# Patient Record
Sex: FEMALE | Race: WHITE | NOT HISPANIC OR LATINO | Employment: OTHER | URBAN - METROPOLITAN AREA
[De-identification: names, ages, dates, MRNs, and addresses within clinical notes are randomized per-mention and may not be internally consistent; named-entity substitution may affect disease eponyms.]

---

## 2017-01-09 RX ORDER — METFORMIN HYDROCHLORIDE 1000 MG/1
TABLET ORAL
Qty: 180 TABLET | Refills: 0 | Status: SHIPPED | OUTPATIENT
Start: 2017-01-09 | End: 2017-08-16 | Stop reason: SDUPTHER

## 2017-01-25 RX ORDER — GLYBURIDE 5 MG/1
TABLET ORAL
Qty: 180 TABLET | Refills: 0 | Status: SHIPPED | OUTPATIENT
Start: 2017-01-25 | End: 2017-08-27 | Stop reason: SDUPTHER

## 2017-01-26 RX ORDER — NORTRIPTYLINE HYDROCHLORIDE 25 MG/1
CAPSULE ORAL
Qty: 90 CAPSULE | Refills: 0 | Status: SHIPPED | OUTPATIENT
Start: 2017-01-26 | End: 2017-08-27 | Stop reason: SDUPTHER

## 2017-01-26 RX ORDER — CARVEDILOL 6.25 MG/1
TABLET ORAL
Qty: 180 TABLET | Refills: 0 | Status: SHIPPED | OUTPATIENT
Start: 2017-01-26 | End: 2017-08-27 | Stop reason: SDUPTHER

## 2017-02-27 ENCOUNTER — TELEPHONE (OUTPATIENT)
Dept: FAMILY MEDICINE | Facility: CLINIC | Age: 77
End: 2017-02-27

## 2017-02-27 NOTE — TELEPHONE ENCOUNTER
----- Message from Yana Radford sent at 2/27/2017  3:17 PM CST -----  Contact: self  Patient called regarding a medication needed for coughing. Stating cannot sleep. Patient left message prior with no contact. Please contact [phone]

## 2017-02-27 NOTE — TELEPHONE ENCOUNTER
Called pt, she stated she has been having a cough. Did not have a flu shot this year. No fever. Cough is keeping up at night. Requesting rx to be sent in for cough medication. I advised PCP is out of clinic all week and no other provider will be able to prescribe her anything with out being seen. She refused appt with any other provider including an NP and the urgent care. She stated she will wait for Dr. Moe to come back and take OTC cough med until then. Please advise.

## 2017-02-27 NOTE — TELEPHONE ENCOUNTER
----- Message from Tati Josue sent at 2/27/2017  9:41 AM CST -----  Pt called stated that she need a call back from the Dr or nurse she has been coughing for weeks/pls call back at [phone]

## 2017-03-07 ENCOUNTER — TELEPHONE (OUTPATIENT)
Dept: FAMILY MEDICINE | Facility: CLINIC | Age: 77
End: 2017-03-07

## 2017-03-07 NOTE — TELEPHONE ENCOUNTER
----- Message from Paco Webster sent at 3/6/2017  4:43 PM CST -----  Contact: pt  Pt returning call, call placed to pod no answer  Call Back#[phone]  Thanks

## 2017-03-07 NOTE — TELEPHONE ENCOUNTER
----- Message from Hernesto Castillo sent at 3/7/2017 10:12 AM CST -----  Contact: same  Unsuccessful call placed to pod.  Patient called in and wanted to see Dr. Moe would call something in for her cough even though she has not seen him in over a year??      [facility] [address] [place], [place] - [address] [place] [id number]  Phone: [phone] Fax: [phone]    Patient call back number is [phone]

## 2017-03-15 ENCOUNTER — TELEPHONE (OUTPATIENT)
Dept: FAMILY MEDICINE | Facility: CLINIC | Age: 77
End: 2017-03-15

## 2017-03-15 ENCOUNTER — PATIENT OUTREACH (OUTPATIENT)
Dept: ADMINISTRATIVE | Facility: HOSPITAL | Age: 77
End: 2017-03-15

## 2017-03-15 NOTE — TELEPHONE ENCOUNTER
----- Message from Sara Finn sent at 3/15/2017 12:23 PM CDT -----  Contact: patient  Patient calling in regards to letting the Doctor know that [facility] called her and said her Diabetic Supplies have been approved. Please advise.   Call back .  Thanks!

## 2017-03-15 NOTE — TELEPHONE ENCOUNTER
Spoke to patient and states she received a letter from [employer] stating that she was approved for her diabetic supplies and she was to call  to let him know. She gets these from [facility] in [place], [place]. Pt needing new prescription. Placed in box for signature.

## 2017-03-15 NOTE — LETTER
March 15, 2017    Latoya Helton  [address] [place] [id number]             [facility]  [address]  [facility] [address]  Phone: [phone] Dear Mrs. Helton:    [facility] is committed to your overall health.  To help you get the most out of each of your visits, we will review your information to make sure you are up to date on all of your recommended tests and/or procedures.      Dr. Moe has found that you may be due for tetanus vaccine, shingles vaccine, Hemoglobin A1C, annual dilated eye exam, flu vaccine, diabetic foot exam, mammogram, lipid panel (choloesterol check).    If you have had any of the above done at another facility, please bring the records or information with you so that your record at [facility] will be complete.    If you are currently taking medication, please bring it with you to your appointment for review.    Also, if you have any type of Advanced Directives, please bring them with you to your office visit so we may scan them into your chart.        If you have any questions or concerns, please don't hesitate to call.    Sincerely,        Salvador Inman LPN Clinical Care Coordinator  [facility] [address] [place] [address]  [phone] (p)   [phone] (f)

## 2017-03-29 ENCOUNTER — OFFICE VISIT (OUTPATIENT)
Dept: FAMILY MEDICINE | Facility: CLINIC | Age: 77
End: 2017-03-29
Payer: MEDICARE

## 2017-03-29 VITALS
SYSTOLIC BLOOD PRESSURE: 144 MMHG | DIASTOLIC BLOOD PRESSURE: 74 MMHG | OXYGEN SATURATION: 97 % | HEIGHT: 62 IN | RESPIRATION RATE: 12 BRPM | WEIGHT: 185.19 LBS | BODY MASS INDEX: 34.08 KG/M2 | HEART RATE: 82 BPM | TEMPERATURE: 98 F

## 2017-03-29 DIAGNOSIS — E11.49 TYPE II OR UNSPECIFIED TYPE DIABETES MELLITUS WITH NEUROLOGICAL MANIFESTATIONS, NOT STATED AS UNCONTROLLED(250.60): ICD-10-CM

## 2017-03-29 DIAGNOSIS — I50.9 CONGESTIVE HEART FAILURE, UNSPECIFIED: ICD-10-CM

## 2017-03-29 DIAGNOSIS — J44.9 CHRONIC OBSTRUCTIVE PULMONARY DISEASE, UNSPECIFIED COPD TYPE: ICD-10-CM

## 2017-03-29 DIAGNOSIS — L97.519 FOOT ULCER, RIGHT, WITH UNSPECIFIED SEVERITY: ICD-10-CM

## 2017-03-29 DIAGNOSIS — E11.9 TYPE II OR UNSPECIFIED TYPE DIABETES MELLITUS WITHOUT MENTION OF COMPLICATION, NOT STATED AS UNCONTROLLED: ICD-10-CM

## 2017-03-29 DIAGNOSIS — Z00.00 ROUTINE HEALTH MAINTENANCE: Primary | ICD-10-CM

## 2017-03-29 PROCEDURE — 3078F DIAST BP <80 MM HG: CPT | Mod: S$GLB,,, | Performed by: FAMILY MEDICINE

## 2017-03-29 PROCEDURE — 3077F SYST BP >= 140 MM HG: CPT | Mod: S$GLB,,, | Performed by: FAMILY MEDICINE

## 2017-03-29 PROCEDURE — 99999 PR PBB SHADOW E&M-EST. PATIENT-LVL III: CPT | Mod: PBBFAC,,, | Performed by: FAMILY MEDICINE

## 2017-03-29 PROCEDURE — 99397 PER PM REEVAL EST PAT 65+ YR: CPT | Mod: S$GLB,,, | Performed by: FAMILY MEDICINE

## 2017-03-29 PROCEDURE — 99499 UNLISTED E&M SERVICE: CPT | Mod: S$GLB,,, | Performed by: FAMILY MEDICINE

## 2017-03-29 RX ORDER — LOSARTAN POTASSIUM 50 MG/1
50 TABLET ORAL DAILY
Qty: 90 TABLET | Refills: 3 | Status: SHIPPED | OUTPATIENT
Start: 2017-03-29 | End: 2019-05-30 | Stop reason: SDUPTHER

## 2017-03-29 NOTE — PROGRESS NOTES
HPI  Latoya Helton is a 76 y.o. female with multiple medical diagnoses as listed in the medical history and problem list that presents for Diabetes  .      Diabetes   She presents for her follow-up diabetic visit. She has type 2 diabetes mellitus. Her disease course has been stable. There are no hypoglycemic associated symptoms. There are no diabetic associated symptoms. There are no hypoglycemic complications. Symptoms are stable. Diabetic complications include peripheral neuropathy. Current diabetic treatment includes oral agent (dual therapy). She is compliant with treatment all of the time. Her weight is decreasing steadily. Her breakfast blood glucose range is generally 140-180 mg/dl. Her dinner blood glucose range is generally 140-180 mg/dl. An ACE inhibitor/angiotensin II receptor blocker is being taken. Eye exam is not current.       PAST MEDICAL HISTORY:  Past Medical History:   Diagnosis Date    Anticoagulant long-term use     ASA 81 mg    Arthritis     degenerative joint disease right knee    Cataract     OU    CHF (congestive heart failure)     in hospital Dec 2012    Chronic back pain     extending to left leg    Colon polyp     Complication of anesthesia     difficult to get IV access    COPD (chronic obstructive pulmonary disease) 12/2012    mild, exacerbation 2012    Coronary artery disease     denies MI, but has Hx angina (post-MI syndrome)    Degenerative disc disease     low back    Diabetes mellitus     type II    Dyspnea on exertion 6/13/13    saw cardiology for increasing symptoms, she is booked for angio in July 2013    Encounter for blood transfusion     GERD (gastroesophageal reflux disease)     Hyperlipidemia     Hypertension     Lower GI bleed 2012    internal hemorrhoids    Neuropathy in diabetes     ABEL (obstructive sleep apnea)     uses CPAP    Right trigger finger 2013    long finger    RLS (restless legs syndrome)        PAST SURGICAL HISTORY:  Past Surgical  "History:   Procedure Laterality Date    APPENDECTOMY      BACK SURGERY      Lumbar laminectomy    CARPAL TUNNEL RELEASE      right hand    CHOLECYSTECTOMY      CORONARY ANGIOPLASTY  1/6/2012    OM1 BMS 3.5 mm stent/ 2 stents    HAND SURGERY      Left/ repair of "broken" hand    HERNIA REPAIR      Hiatal    HYSTERECTOMY      SPINAL CORD STIMULATOR TRIAL W/ LAMINOTOMY  5/22/13    TRIGGER FINGER RELEASE  Jan 2013    right        SOCIAL HISTORY:  Social History     Social History    Marital status:      Spouse name: N/A    Number of children: N/A    Years of education: N/A     Occupational History    Not on file.     Social History Main Topics    Smoking status: Former Smoker     Packs/day: 0.25     Years: 10.00     Quit date: 4/17/1980    Smokeless tobacco: Never Used      Comment: stopped smoking in the 80s    Alcohol use No    Drug use: No    Sexual activity: No     Other Topics Concern    Not on file     Social History Narrative       FAMILY HISTORY:  Family History   Problem Relation Age of Onset    Heart disease Mother     Diabetes Mother     Heart attack Mother     Diabetes Sister     Heart disease Sister     Amblyopia Neg Hx     Blindness Neg Hx     Cancer Neg Hx     Cataracts Neg Hx     Glaucoma Neg Hx     Hypertension Neg Hx     Macular degeneration Neg Hx     Retinal detachment Neg Hx     Strabismus Neg Hx     Stroke Neg Hx     Thyroid disease Neg Hx        ALLERGIES AND MEDICATIONS: updated and reviewed.  Review of patient's allergies indicates:   Allergen Reactions    Ciprofloxacin Other (See Comments)     Other reaction(s): tendon rupture    Gabapentin Rash     Current Outpatient Prescriptions   Medication Sig Dispense Refill    albuterol (PROVENTIL) 2.5 mg /3 mL (0.083 %) nebulizer solution Take 3 mLs (2.5 mg total) by nebulization every 4 (four) hours as needed for Wheezing. 90 mL 3    alcohol swabs (BD ALCOHOL SWAB) PadM Apply 1 each topically once daily. " 100 each 3    aspirin (ECOTRIN) 81 MG EC tablet Take 1 tablet (81 mg total) by mouth once daily. 90 tablet 3    beclomethasone (QVAR) 40 mcg/actuation Aero Inhale 1 puff into the lungs 2 (two) times daily. Not sure of dose       blood sugar diagnostic (BLOOD GLUCOSE TEST) Strp Microlet lancets & Contour Test strips. Test BID. 200 strip 3    CALCIUM CARBONATE ORAL Take 1,800 mg by mouth once daily.        carvedilol (COREG) 6.25 MG tablet TAKE ONE TABLET BY MOUTH TWICE DAILY 180 tablet 0    DOCOSAHEXANOIC ACID/EPA (FISH OIL ORAL) Take 1 capsule by mouth once daily.      ERGOCALCIFEROL, VITAMIN D2, (VITAMIN D ORAL) Take 1,000 mg by mouth once daily.        fluticasone-salmeterol 250-50 mcg/dose (ADVAIR DISKUS) 250-50 mcg/dose diskus inhaler Inhale 1 puff into the lungs 2 (two) times daily. 180 each 3    furosemide (LASIX) 40 MG tablet Take 1 tablet (40 mg total) by mouth 2 (two) times daily. 180 tablet 3    glyBURIDE (DIABETA) 5 MG tablet TAKE ONE TABLET BY MOUTH TWICE DAILY WITH MEALS 180 tablet 0    iron-vitamin C 100-250 mg, ICAR-C, 100-250 mg Tab Take one tablet in the morning with an acidic drink before breakfast. 90 tablet 0    lancets (TRUEPLUS LANCETS) Misc Use daily 100 each 3    losartan (COZAAR) 50 MG tablet Take 1 tablet (50 mg total) by mouth once daily. 90 tablet 3    metformin (GLUCOPHAGE) 1000 MG tablet TAKE ONE TABLET BY MOUTH TWICE DAILY AFTER  A  MEAL. 180 tablet 0    multivitamin-minerals-lutein (CENTRUM SILVER) Tab Take 1 tablet by mouth once daily. 90 tablet 3    nortriptyline (PAMELOR) 25 MG capsule TAKE ONE CAPSULE BY MOUTH ONCE DAILY IN  THE  EVENING. 90 capsule 0    omeprazole (PRILOSEC) 40 MG capsule Take 1 capsule (40 mg total) by mouth once daily. 90 capsule 3    potassium chloride SA (K-DUR) 20 MEQ tablet Take 1 tablet (20 mEq total) by mouth once daily. 90 tablet 3    estradiol (ESTRACE) 1 MG tablet Take 1 tablet (1 mg total) by mouth once daily. (Patient taking  "differently: Take 1 mg by mouth once daily. Patient currently off of med per Dr. Moe's orders) 90 tablet 2     No current facility-administered medications for this visit.        ROS  Review of Systems    Physical Exam  Vitals:    03/29/17 1515   BP: (!) 144/74   Pulse: 82   Resp: 12   Temp: 98.4 °F (36.9 °C)    Body mass index is 33.87 kg/(m^2).  Weight: 84 kg (185 lb 3 oz)   Height: 5' 2" (157.5 cm)     Physical Exam   Constitutional: She is oriented to person, place, and time. She appears well-developed and well-nourished.   HENT:   Head: Normocephalic and atraumatic.   Right Ear: External ear normal.   Left Ear: External ear normal.   Nose: Nose normal.   Mouth/Throat: Oropharynx is clear and moist.   Eyes: Conjunctivae and EOM are normal. Pupils are equal, round, and reactive to light. No scleral icterus.   Neck: Normal range of motion. Neck supple. No JVD present. No thyromegaly present.   Cardiovascular: Normal rate, regular rhythm and normal heart sounds.  Exam reveals no gallop and no friction rub.    No murmur heard.  Pulmonary/Chest: Effort normal and breath sounds normal. She has no wheezes. She has no rales.   Abdominal: Soft. Bowel sounds are normal. She exhibits no distension and no mass. There is no tenderness. There is no rebound and no guarding.   Musculoskeletal: Normal range of motion. She exhibits no edema.   Feet:   Right Foot:   Skin Integrity: Positive for ulcer (on right bunion area).   Lymphadenopathy:     She has no cervical adenopathy.   Neurological: She is alert and oriented to person, place, and time. She has normal strength. No cranial nerve deficit or sensory deficit.   Skin: Skin is warm and dry. No rash noted.   Vitals reviewed.      Health Maintenance       Date Due Completion Date    TETANUS VACCINE 12/28/1960 ---    Zoster Vaccine 12/28/2002 ---    Hemoglobin A1c 3/28/2016 9/28/2015 (Done)    Override on 9/28/2015: Done    Eye Exam 4/28/2016 4/28/2015 (Done)    Override on " 4/28/2015: Done    Override on 12/1/2014: Done (Juan Trinidad, OD (negative diabetic retinopathy))    Override on 7/19/2011: (N/S)    Influenza Vaccine 8/1/2016 9/28/2015    Foot Exam 9/28/2016 9/28/2015    Mammogram 10/30/2016 10/30/2015    Lipid Panel 10/30/2016 10/30/2015    DEXA SCAN 10/30/2018 10/30/2015    Colonoscopy 6/25/2024 6/25/2014    Override on 6/6/2011: (N/S)          Assessment & Plan    Routine health maintenance  -     Mammo Digital Screening Bilat with CAD; Future; Expected date: 3/29/17  - Health maintenance reviewed  - Diet and exercise education.    Type II or unspecified type diabetes mellitus without mention of complication, not stated as uncontrolled with neurological manifestations, not stated as uncontrolled  - Diet and exercise education.  - Serial glucose monitoring  - Continue current therapy  -     Comprehensive metabolic panel; Future; Expected date: 3/29/17  -     Lipid panel; Future; Expected date: 3/29/17  -     Hemoglobin A1c; Future; Expected date: 3/29/17    Congestive heart failure, unspecified  - Continue current therapy  - Serial blood pressure monitoring  - Diet and exercise education.    Chronic obstructive pulmonary disease, unspecified COPD type  - Continue current therapy    Foot ulcer, right, with unspecified severity  -     Ambulatory consult to Podiatry    Other orders  -     losartan (COZAAR) 50 MG tablet; Take 1 tablet (50 mg total) by mouth once daily.  Dispense: 90 tablet; Refill: 3        No Follow-up on file.

## 2017-03-29 NOTE — MR AVS SNAPSHOT
[facility]  [address] [facility] [address]  [facility] [address]  Phone: [phone]  Fax: [phone]                  Latoya Helton   3/29/2017 3:00 PM   Office Visit    Description:  Female : 1940   Provider:  Scott Moe MD   Department:  [facility]           Reason for Visit     Diabetes           Diagnoses this Visit        Comments    Routine health maintenance    -  Primary     Type II or unspecified type diabetes mellitus without mention of complication, not stated as uncontrolled         Type II or unspecified type diabetes mellitus with neurological manifestations, not stated as uncontrolled         Congestive heart failure, unspecified         Chronic obstructive pulmonary disease, unspecified COPD type         Foot ulcer, right, with unspecified severity                To Do List           Future Appointments        Provider Department Dept Phone    3/30/2017 8:20 AM Chon Killian DPM [facility] [phone]    3/30/2017 8:40 AM LAB, [place] [facility] [phone]    2017 2:30 PM [facility] [phone]    7/3/2017 1:15 PM Scott Moe MD [place] [phone]      Goals (5 Years of Data)     None      Follow-Up and Disposition     Return in about 6 months (around 2017).    Follow-up and Disposition History       These Medications        Disp Refills Start End    losartan (COZAAR) 50 MG tablet 90 tablet 3 3/29/2017     Take 1 tablet (50 mg total) by mouth once daily. - Oral    Pharmacy: [facility] - [address] Ph #: [phone]         [facility] On Call     [facility] On [facility] Nurse Care Line -  Assistance  Registered nurses in the [facility] provide clinical advisement, health education, appointment booking, and other advisory services.  Call for this free service at [phone].             Medications            Message regarding Medications     Verify the changes and/or additions to your medication regime listed below are the same as discussed with your clinician today.  If any of these changes or additions are incorrect, please notify your healthcare provider.        CHANGE how you are taking these medications     Start Taking Instead of    losartan (COZAAR) 50 MG tablet losartan (COZAAR) 50 MG tablet    Dosage:  Take 1 tablet (50 mg total) by mouth once daily. Dosage:  TAKE ONE TABLET BY MOUTH ONCE DAILY    Reason for Change:  Reorder            Verify that the below list of medications is an accurate representation of the medications you are currently taking.  If none reported, the list may be blank. If incorrect, please contact your healthcare provider. Carry this list with you in case of emergency.           Current Medications     albuterol (PROVENTIL) 2.5 mg /3 mL (0.083 %) nebulizer solution Take 3 mLs (2.5 mg total) by nebulization every 4 (four) hours as needed for Wheezing.    alcohol swabs (BD ALCOHOL SWAB) PadM Apply 1 each topically once daily.    aspirin (ECOTRIN) 81 MG EC tablet Take 1 tablet (81 mg total) by mouth once daily.    beclomethasone (QVAR) 40 mcg/actuation Aero Inhale 1 puff into the lungs 2 (two) times daily. Not sure of dose     blood sugar diagnostic (BLOOD GLUCOSE TEST) Strp Microlet lancets & Contour Test strips. Test BID.    CALCIUM CARBONATE ORAL Take 1,800 mg by mouth once daily.      carvedilol (COREG) 6.25 MG tablet TAKE ONE TABLET BY MOUTH TWICE DAILY    DOCOSAHEXANOIC ACID/EPA (FISH OIL ORAL) Take 1 capsule by mouth once daily.    ERGOCALCIFEROL, VITAMIN D2, (VITAMIN D ORAL) Take 1,000 mg by mouth once daily.      fluticasone-salmeterol 250-50 mcg/dose (ADVAIR DISKUS) 250-50 mcg/dose diskus inhaler Inhale 1 puff into the lungs 2 (two) times daily.    furosemide (LASIX) 40 MG tablet Take 1 tablet (40 mg total) by mouth 2 (two) times daily.    glyBURIDE (DIABETA) 5 MG tablet  "TAKE ONE TABLET BY MOUTH TWICE DAILY WITH MEALS    iron-vitamin C 100-250 mg, ICAR-C, 100-250 mg Tab Take one tablet in the morning with an acidic drink before breakfast.    lancets (TRUEPLUS LANCETS) Misc Use daily    losartan (COZAAR) 50 MG tablet Take 1 tablet (50 mg total) by mouth once daily.    metformin (GLUCOPHAGE) 1000 MG tablet TAKE ONE TABLET BY MOUTH TWICE DAILY AFTER  A  MEAL.    multivitamin-minerals-lutein (CENTRUM SILVER) Tab Take 1 tablet by mouth once daily.    nortriptyline (PAMELOR) 25 MG capsule TAKE ONE CAPSULE BY MOUTH ONCE DAILY IN  THE  EVENING.    omeprazole (PRILOSEC) 40 MG capsule Take 1 capsule (40 mg total) by mouth once daily.    potassium chloride SA (K-DUR) 20 MEQ tablet Take 1 tablet (20 mEq total) by mouth once daily.    estradiol (ESTRACE) 1 MG tablet Take 1 tablet (1 mg total) by mouth once daily.           Clinical Reference Information           Your Vitals Were     BP Pulse Temp Resp Height Weight    144/74 (BP Location: Right arm, Patient Position: Sitting) 82 98.4 °F (36.9 °C) (Oral) 12 5' 2" (1.575 m) 84 kg (185 lb 3 oz)    SpO2 BMI             97% 33.87 kg/m2         Blood Pressure          Most Recent Value    BP  (!)  144/74      Allergies as of 3/29/2017     Ciprofloxacin    Gabapentin      Immunizations Administered on Date of Encounter - 3/29/2017     None      Orders Placed During Today's Visit      Normal Orders This Visit    Ambulatory consult to Podiatry     Future Labs/Procedures Expected by Expires    Comprehensive metabolic panel  3/29/2017 6/27/2017    Hemoglobin A1c  3/29/2017 5/28/2018    Lipid panel  3/29/2017 6/27/2017    Mammo Digital Screening Bilat with CAD  3/29/2017 6/27/2017      Language Assistance Services     ATTENTION: Language assistance services are available, free of charge. Please call [phone].      ATENCIÓN: Si usha jeyson, tiene a lopez disposición servicios gratuitos de asistencia lingüística. Llame al [phone].     CHÚ Ý: N?u " b?n nói Ti?ng Vi?t, có các d?ch v? h? tr? ngôn ng? mi?n phí dành cho b?n. G?i s? [phone].         [facility] complies with applicable Federal civil rights laws and does not discriminate on the basis of race, color, national origin, age, disability, or sex.

## 2017-04-26 LAB
LEFT EYE DM RETINOPATHY: NEGATIVE
RIGHT EYE DM RETINOPATHY: NEGATIVE

## 2017-05-24 ENCOUNTER — HOSPITAL ENCOUNTER (OUTPATIENT)
Dept: RADIOLOGY | Facility: HOSPITAL | Age: 77
Discharge: HOME | End: 2017-05-24
Attending: FAMILY MEDICINE
Payer: MEDICARE

## 2017-05-24 DIAGNOSIS — Z12.31 VISIT FOR SCREENING MAMMOGRAM: ICD-10-CM

## 2017-05-24 DIAGNOSIS — Z00.00 ROUTINE HEALTH MAINTENANCE: ICD-10-CM

## 2017-05-24 PROCEDURE — 77067 SCR MAMMO BI INCL CAD: CPT | Mod: 26,,, | Performed by: RADIOLOGY

## 2017-05-24 PROCEDURE — 77063 BREAST TOMOSYNTHESIS BI: CPT | Mod: 26,,, | Performed by: RADIOLOGY

## 2017-05-24 PROCEDURE — 77067 SCR MAMMO BI INCL CAD: CPT | Mod: TC

## 2017-06-23 ENCOUNTER — TELEPHONE (OUTPATIENT)
Dept: FAMILY MEDICINE | Facility: CLINIC | Age: 77
End: 2017-06-23

## 2017-06-23 NOTE — TELEPHONE ENCOUNTER
----- Message from Jennifer Higginbotham sent at 6/23/2017 12:54 PM CDT -----  Contact: Minda with [facility]  Minda with [facility] called requesting medical necessity form,clinical notes, and dr's order. Patient would need enough supplies to test three times daily. diabetic testing supplies./truemetrix meter/strips and lancets. Please fax [phone]. If any questions call back at [phone]. Thanks,

## 2017-06-23 NOTE — TELEPHONE ENCOUNTER
Member is out of diabetic supplies because she is testing more often. Needs new order for supplies to test 3x daily. Placed in box for signature along with MNF and notes.

## 2017-06-27 ENCOUNTER — TELEPHONE (OUTPATIENT)
Dept: FAMILY MEDICINE | Facility: CLINIC | Age: 77
End: 2017-06-27

## 2017-06-27 NOTE — TELEPHONE ENCOUNTER
----- Message from Vidhya Amezcua sent at 6/27/2017 11:14 AM CDT -----  Contact: rep with [facility] # [phone]  Message for Liyah, clarification on order for Diabetic Supplies  Call back on # [phone]  thanks

## 2017-06-27 NOTE — TELEPHONE ENCOUNTER
Called Jo-Ann with [facility], no answer left voice mail to have her return call.

## 2017-06-28 NOTE — TELEPHONE ENCOUNTER
----- Message from Hernesto Castillo sent at 6/28/2017 11:48 AM CDT -----  Contact: Jo-Ann/[employer]  [facility]--Jo-Ann called in regarding the attached patient. Order was received for diabetic supplies.  Jo-Ann stated the order for the supplies needs to be cancelled as there was already an existing order.  Jo-Ann needs a verbal cancel or refax order with cancel written across.    Jo-Ann's call back number is: [phone] or fax number is: [phone]

## 2017-08-16 RX ORDER — METFORMIN HYDROCHLORIDE 1000 MG/1
TABLET ORAL
Qty: 180 TABLET | Refills: 2 | Status: SHIPPED | OUTPATIENT
Start: 2017-08-16 | End: 2018-08-03 | Stop reason: SDUPTHER

## 2017-08-27 ENCOUNTER — NURSE TRIAGE (OUTPATIENT)
Dept: ADMINISTRATIVE | Facility: CLINIC | Age: 77
End: 2017-08-27

## 2017-08-27 ENCOUNTER — TELEPHONE (OUTPATIENT)
Dept: FAMILY MEDICINE | Facility: CLINIC | Age: 77
End: 2017-08-27

## 2017-08-27 RX ORDER — NORTRIPTYLINE HYDROCHLORIDE 25 MG/1
CAPSULE ORAL
Qty: 90 CAPSULE | Refills: 2 | Status: SHIPPED | OUTPATIENT
Start: 2017-08-27 | End: 2019-01-21 | Stop reason: SDUPTHER

## 2017-08-27 RX ORDER — CARVEDILOL 6.25 MG/1
TABLET ORAL
Qty: 180 TABLET | Refills: 2 | Status: SHIPPED | OUTPATIENT
Start: 2017-08-27 | End: 2017-12-11 | Stop reason: SDUPTHER

## 2017-08-27 RX ORDER — GLYBURIDE 5 MG/1
TABLET ORAL
Qty: 180 TABLET | Refills: 2 | Status: SHIPPED | OUTPATIENT
Start: 2017-08-27 | End: 2018-07-17 | Stop reason: SDUPTHER

## 2017-08-27 NOTE — TELEPHONE ENCOUNTER
Reason for Disposition   Caller requesting a NON-URGENT new prescription or refill and triager unable to refill per unit policy    Protocols used: ST MEDICATION QUESTION CALL-A-AH    Patient states she needs refills on carvedilol, glyburide and notriptyline and his appt is not until 11/2017. Patient states that she has not been taking her medication as prescribed. BS is 125 this morning and patient denies chest pain. She states her pharmacy states they will give her a 3 day supply. Informed patient a message would be sent to Dr. Moe's office but they may contact her about coming in since her next visit won't be until 11/2017. Ms. Danie verbalized understanding.

## 2017-08-28 NOTE — TELEPHONE ENCOUNTER
----- Message from Jennifer Higginbotham sent at 8/28/2017  3:38 PM CDT -----  Contact: patient  Patient called requesting refill on all medications.send to [facility] pharmacy.please call back at [phone] to advise when scripts has been sent. Thanks,

## 2017-08-29 ENCOUNTER — TELEPHONE (OUTPATIENT)
Dept: FAMILY MEDICINE | Facility: CLINIC | Age: 77
End: 2017-08-29

## 2017-08-29 NOTE — TELEPHONE ENCOUNTER
----- Message from Janie Mercer sent at 8/28/2017  4:17 PM CDT -----  Contact: Latoya  Patient is returning call. Please call [phone]. Thanks!

## 2017-08-29 NOTE — TELEPHONE ENCOUNTER
----- Message from Glendy Chambers sent at 8/29/2017  2:05 PM CDT -----  Contact: [facility] call   Call back

## 2017-11-08 ENCOUNTER — TELEPHONE (OUTPATIENT)
Dept: FAMILY MEDICINE | Facility: CLINIC | Age: 77
End: 2017-11-08

## 2017-11-08 NOTE — TELEPHONE ENCOUNTER
Attempted to contact pt. Number on file is not reachable.     Attempted to contact another number in chart. Unable to leave message on voicemail.

## 2017-12-08 DIAGNOSIS — E11.9 TYPE 2 DIABETES MELLITUS WITHOUT COMPLICATION: ICD-10-CM

## 2017-12-11 RX ORDER — CARVEDILOL 6.25 MG/1
6.25 TABLET ORAL 2 TIMES DAILY
Qty: 180 TABLET | Refills: 1 | Status: SHIPPED | OUTPATIENT
Start: 2017-12-11 | End: 2018-09-27 | Stop reason: SDUPTHER

## 2018-03-23 ENCOUNTER — TELEPHONE (OUTPATIENT)
Dept: FAMILY MEDICINE | Facility: CLINIC | Age: 78
End: 2018-03-23

## 2018-03-23 NOTE — TELEPHONE ENCOUNTER
"Attempted to contact pt to beata appt from [facility] to [place]. No answer. Unable to leave . "Caller not accepting calls."  "

## 2018-03-26 ENCOUNTER — TELEPHONE (OUTPATIENT)
Dept: PAIN MEDICINE | Facility: CLINIC | Age: 78
End: 2018-03-26

## 2018-03-26 NOTE — TELEPHONE ENCOUNTER
Please call the patient and make an appointment for her to discuss IPG change.  Spoke with the spinal cord stimulator representative from [employer].

## 2018-04-02 ENCOUNTER — TELEPHONE (OUTPATIENT)
Dept: PAIN MEDICINE | Facility: CLINIC | Age: 78
End: 2018-04-02

## 2018-04-02 NOTE — TELEPHONE ENCOUNTER
----- Message from Jennifer Higginbotham sent at 4/2/2018  3:19 PM CDT -----  Contact: patient  Patient called regarding stimulator  in her back.requesting a call  back at [phone]. Thanks,

## 2018-04-02 NOTE — TELEPHONE ENCOUNTER
----- Message from Nadia Guerra sent at 4/2/2018 11:50 AM CDT -----  Contact: self [phone]  She is calling about scheduling an appt to get a new stimulator.  Please call her.  Thank you!

## 2018-05-07 ENCOUNTER — TELEPHONE (OUTPATIENT)
Dept: FAMILY MEDICINE | Facility: CLINIC | Age: 78
End: 2018-05-07

## 2018-05-07 NOTE — TELEPHONE ENCOUNTER
----- Message from Verona Mason sent at 5/7/2018  1:32 PM CDT -----  Contact: patient   Patient calling to reschedule her appointment for 05/15/18. No available appointments. She is unable to make her appointment tomorrow 5/8/18 but does not want to wait til the next available which is 08/17/18. Please advise.   Call    Thanks!

## 2018-05-15 ENCOUNTER — OFFICE VISIT (OUTPATIENT)
Dept: PAIN MEDICINE | Facility: CLINIC | Age: 78
End: 2018-05-15
Payer: MEDICARE

## 2018-05-15 VITALS
TEMPERATURE: 97 F | SYSTOLIC BLOOD PRESSURE: 155 MMHG | BODY MASS INDEX: 35.75 KG/M2 | WEIGHT: 195.44 LBS | OXYGEN SATURATION: 94 % | DIASTOLIC BLOOD PRESSURE: 92 MMHG | HEART RATE: 80 BPM | RESPIRATION RATE: 20 BRPM

## 2018-05-15 DIAGNOSIS — G89.4 CHRONIC PAIN SYNDROME: ICD-10-CM

## 2018-05-15 DIAGNOSIS — M54.16 LUMBAR RADICULITIS: ICD-10-CM

## 2018-05-15 DIAGNOSIS — M65.331 TRIGGER MIDDLE FINGER OF RIGHT HAND: ICD-10-CM

## 2018-05-15 DIAGNOSIS — M96.1 POSTLAMINECTOMY SYNDROME OF LUMBAR REGION: Primary | ICD-10-CM

## 2018-05-15 PROCEDURE — 3080F DIAST BP >= 90 MM HG: CPT | Mod: CPTII,S$GLB,, | Performed by: PHYSICIAN ASSISTANT

## 2018-05-15 PROCEDURE — 3077F SYST BP >= 140 MM HG: CPT | Mod: CPTII,S$GLB,, | Performed by: PHYSICIAN ASSISTANT

## 2018-05-15 PROCEDURE — 99999 PR PBB SHADOW E&M-EST. PATIENT-LVL V: CPT | Mod: PBBFAC,,, | Performed by: PHYSICIAN ASSISTANT

## 2018-05-15 PROCEDURE — 99213 OFFICE O/P EST LOW 20 MIN: CPT | Mod: S$GLB,,, | Performed by: PHYSICIAN ASSISTANT

## 2018-05-15 NOTE — PROGRESS NOTES
CC: Back pain    HPI: Patient is a 77-year-old female with a history of type 2 diabetes, ABEL, hypertension, and lumbar spondylosis and degenerative disc disease who presents for followup of bilateral low back pain and left leg radicular pain.  She returns in follow-up today to discuss changing her IPG.  I had spoken with the spinal cord stimulator representative from [employer] several months ago and the patient's IPG is no longer working.  She had an implant here in 2013.  She continues to have pain across the low back with numbness in her left leg.  Her pain is worse with standing and walking, improved with sitting.  She denies weakness, bladder or bowel incontinence.  She also complains of right third finger pain.  She has seen orthopedics here in the past and had a trigger finger release several years ago.    Pain Intervention history: She is status post left L3 transforaminal steroid injection on 4/25/13 and reports having moderate relief but only lasting about one week. Caudal JERI 9/13/10 with about 50% relief lasting one month.  She's been followed by Dr. Mendiola in the past and has undergone multiple interventions without benefit. Interventions include left-sided L4 and L5 medial branch radiofrequency ablation, left-sided L4/5 and L5/S1 facet injections, and both L2 and L3 selective nerve root steroid injections. She status post spinal cord stimulator implantation on 6/17/13. As far as medications, she has taken Elavil and meloxicam previously which did not provide benefit. She tried tramadol which seem to be effective initially but lost effectiveness. She developed rash with gabapentin. She is status post left L2 transforaminal epidural steroid injection on 4/23/14 with almost 100% relief lasting a couple weeks, now reporting a mild return in her pain.  She is unable to take NSAIDs as per her primary care physician.    REVIEW OF SYSTEMS: Negative for fevers, chills, nausea, vomiting, diarrhea, chest pain, bowel  or bladder incontinence, lower extremity weakness, positive for weight gain, cough, diabetes and back pain.     Medical, surgical, social, family history reviewed elsewhere in document.    Allg/Med: see med card.    Vitals:    05/15/18 1550   BP: (!) 155/92   Pulse: 80   Resp: 20   Temp: 97.1 °F (36.2 °C)   TempSrc: Oral   SpO2: (!) 94%   Weight: 88.6 kg (195 lb 7 oz)   PainSc: 0-No pain   PainLoc: Back         Physical Exam:  Gen: A and O x3, pleasant, well-groomed  Skin: No rashes or obvious lesions.   HEENT: PERRLA  CVS: Regular rate and rhythm, normal S1 and S2, no murmurs.  Resp: Clear to auscultation bilaterally, no wheezes or rales.  Abdomen: Soft, NT/ND, normal bowel sounds present.  Musculoskeletal: Able to heel walk, toe walk.     Neuro:  Lower extremities: 5/5 strength bilaterally  Reflexes: Patellar 1 +, Achilles 1 +.  Sensory: Intact and symmetrical to light touch and pinprick in L2-S1 dermatomes bilaterally except in the left L3 distribution.    Lumbar exam:  Lumbar spine: Range of motion moderately reduced with flexion and extension with increased pain in the bilateral low back.  Seated straight leg raise is negative bilaterally.  Akira's test is deferred.  Internal/external rotation of the hip is negative bilaterally.  Myofascial exam: mild tenderness to palpation across the bilateral lumbar paraspinous muscles.      Imagin13 MRI L-spine  T12-L1: No central canal or neuroforaminal stenosis. No disc protrusion or extrusion.  L1-L2: No central canal or neuroforaminal stenosis. No disc protrusion or extrusion.  L2-L3: There is a grade I retrolisthesis with uncovering of the intervertebral disk and a superimposed broad based disk bulge. There is mild overall central canal stenosis. There is severe left neuroforaminal stenosis with probable encroachment upon the exiting left L2 nerve as a result of the aforementioned scoliotic curvature. No right neuroforaminal stenosis.  L3-L4: No central  canal or neuroforaminal stenosis. No disc protrusion or extrusion.  L4-L5: There is bilateral hypertrophic facet arthropathy. There is a broad-based disk bulge. There is mild-moderate left and right neuroforaminal stenosis. No central canal stenosis.  L5-S1: There is severe bilateral hypertrophic facet arthropathy. There is thickening of the ligamentum flavum and ossification of the ligamentum flavum. No central canal or neuroforaminal stenosis.                                                                     Assessment:  Patient is a 77-year-old female with a history of type 2 diabetes, ABEL, hypertension, and lumbar spondylosis and degenerative disc disease who presents for followup of bilateral low back pain and left leg radicular pain.     1. Postlaminectomy syndrome of lumbar region     2. Chronic pain syndrome     3. Lumbar radiculitis     4. Trigger middle finger of right hand  Ambulatory referral to Orthopedics         Plan:  1.  I will set the patient up for an IPG exchange with [employer].  We discussed the risks involved.  2.  I placed a referral to orthopedics.  She has had a trigger finger release here in the past but that physician is no longer here.  3.  Follow-up in 7 days postop or sooner as needed.

## 2018-05-16 ENCOUNTER — TELEPHONE (OUTPATIENT)
Dept: PAIN MEDICINE | Facility: CLINIC | Age: 78
End: 2018-05-16

## 2018-05-16 DIAGNOSIS — G89.4 CHRONIC PAIN DISORDER: Primary | ICD-10-CM

## 2018-05-16 RX ORDER — SODIUM CHLORIDE, SODIUM LACTATE, POTASSIUM CHLORIDE, CALCIUM CHLORIDE 600; 310; 30; 20 MG/100ML; MG/100ML; MG/100ML; MG/100ML
INJECTION, SOLUTION INTRAVENOUS CONTINUOUS
Status: CANCELLED | OUTPATIENT
Start: 2018-07-26

## 2018-05-16 NOTE — TELEPHONE ENCOUNTER
Patient is scheduled for a IPG battery change and will need to stop aspirin 7 days before. Please advise.

## 2018-06-13 ENCOUNTER — TELEPHONE (OUTPATIENT)
Dept: PAIN MEDICINE | Facility: CLINIC | Age: 78
End: 2018-06-13

## 2018-06-15 ENCOUNTER — TELEPHONE (OUTPATIENT)
Dept: FAMILY MEDICINE | Facility: CLINIC | Age: 78
End: 2018-06-15

## 2018-06-15 ENCOUNTER — TELEPHONE (OUTPATIENT)
Dept: PAIN MEDICINE | Facility: CLINIC | Age: 78
End: 2018-06-15

## 2018-06-15 NOTE — TELEPHONE ENCOUNTER
----- Message from aJnie Rome sent at 6/15/2018  3:09 PM CDT -----  Contact: self  Patient [phone] is returning call to Nurse from earlier today/please call

## 2018-06-15 NOTE — TELEPHONE ENCOUNTER
----- Message from Esther Moser sent at 6/15/2018  3:40 PM CDT -----  Contact: self  Type:  Patient Returning Call    Who Called:  patient  Who Left Message for Patient: nurse  Does the patient know what this is regarding?:  no  Best Call Back Number [phone]  Additional Information:  Returning call

## 2018-06-18 ENCOUNTER — TELEPHONE (OUTPATIENT)
Dept: FAMILY MEDICINE | Facility: CLINIC | Age: 78
End: 2018-06-18

## 2018-06-18 ENCOUNTER — TELEPHONE (OUTPATIENT)
Dept: PAIN MEDICINE | Facility: CLINIC | Age: 78
End: 2018-06-18

## 2018-06-18 NOTE — TELEPHONE ENCOUNTER
----- Message from Nadia Guerra sent at 6/18/2018 12:29 PM CDT -----  Contact: self [phone]  Patient returned your call, please call back.  Thank you!

## 2018-06-18 NOTE — TELEPHONE ENCOUNTER
----- Message from Aleah Borrero sent at 6/18/2018 12:22 PM CDT -----  Contact: self  Returning missed call. Please call back [phone]

## 2018-06-18 NOTE — TELEPHONE ENCOUNTER
Spoke with the patient regarding her surgery possible move up. Scheduled for 1200. Not able to move up due to ride.

## 2018-06-19 ENCOUNTER — TELEPHONE (OUTPATIENT)
Dept: PAIN MEDICINE | Facility: CLINIC | Age: 78
End: 2018-06-19

## 2018-06-19 NOTE — TELEPHONE ENCOUNTER
----- Message from Janie Mercer sent at 6/19/2018 11:24 AM CDT -----  Contact: Latoya  Patient is checking back on appointment for simulator. Please call [phone]. Thanks!

## 2018-06-20 ENCOUNTER — TELEPHONE (OUTPATIENT)
Dept: FAMILY MEDICINE | Facility: CLINIC | Age: 78
End: 2018-06-20

## 2018-06-20 ENCOUNTER — TELEPHONE (OUTPATIENT)
Dept: PAIN MEDICINE | Facility: CLINIC | Age: 78
End: 2018-06-20

## 2018-06-20 NOTE — TELEPHONE ENCOUNTER
----- Message from Karyn Rolon sent at 6/20/2018 11:16 AM CDT -----  Type:  Patient Returning Call    Who Called:  Patient  Who Left Message for Patient:  Brenda  Does the patient know what this is regarding?:  Rescheduling her appointment  Best Call Back Number:  [phone]  Additional Information:  Left message to reschedule 7/17/18. The earliest opening was 10/1/18. She wants an earlier appointment.

## 2018-06-20 NOTE — TELEPHONE ENCOUNTER
----- Message from Blaze Porter sent at 6/20/2018 11:25 AM CDT -----  Type:  Patient Returning Call    Who Called:  Self   Who Left Message for Patient:  Sylvie  Does the patient know what this is regarding?:  Patient returning the nurse phone call.  Best Call Back Number:  [phone] Additional Information: Call was placed to the pod.

## 2018-06-20 NOTE — TELEPHONE ENCOUNTER
----- Message from Fouzia Wong sent at 6/20/2018 10:55 AM CDT -----  Contact: pt  Pt calling states that nurse Brigid was supposed to be calling her back regarding the procedure for stimulator done on 7/26...[phone]

## 2018-06-20 NOTE — TELEPHONE ENCOUNTER
----- Message from Joie Andujar sent at 6/20/2018  2:46 PM CDT -----  Contact: self  Type:  Patient Returning Call    Who Called:  self  Who Left Message for Patient:  Not sure of name  Does the patient know what this is regarding?:  yes  Best Call Back Number:  [phone]  Additional Information:  Patient states it is to explain the procedure

## 2018-06-21 ENCOUNTER — TELEPHONE (OUTPATIENT)
Dept: PAIN MEDICINE | Facility: CLINIC | Age: 78
End: 2018-06-21

## 2018-06-21 NOTE — TELEPHONE ENCOUNTER
----- Message from Nadia Guerra sent at 6/21/2018 12:32 PM CDT -----  Contact: self [phone]  Call placed to pod. Patient returned your call, please call back.  Thank you!

## 2018-07-06 ENCOUNTER — CLINICAL SUPPORT (OUTPATIENT)
Dept: PAIN MEDICINE | Facility: CLINIC | Age: 78
End: 2018-07-06
Payer: MEDICARE

## 2018-07-06 DIAGNOSIS — Z11.9 SCREENING EXAMINATION FOR INFECTIOUS DISEASE: Primary | ICD-10-CM

## 2018-07-06 PROCEDURE — 87081 CULTURE SCREEN ONLY: CPT

## 2018-07-06 PROCEDURE — 99999 PR PBB SHADOW E&M-EST. PATIENT-LVL I: CPT | Mod: PBBFAC,,, | Performed by: PHYSICIAN ASSISTANT

## 2018-07-08 LAB — MRSA SPEC QL CULT: NORMAL

## 2018-07-17 ENCOUNTER — OFFICE VISIT (OUTPATIENT)
Dept: FAMILY MEDICINE | Facility: CLINIC | Age: 78
End: 2018-07-17
Payer: MEDICARE

## 2018-07-17 VITALS
SYSTOLIC BLOOD PRESSURE: 136 MMHG | BODY MASS INDEX: 36.52 KG/M2 | OXYGEN SATURATION: 97 % | RESPIRATION RATE: 16 BRPM | HEIGHT: 62 IN | WEIGHT: 198.44 LBS | HEART RATE: 67 BPM | DIASTOLIC BLOOD PRESSURE: 78 MMHG

## 2018-07-17 DIAGNOSIS — K43.9 VENTRAL HERNIA WITHOUT OBSTRUCTION OR GANGRENE: ICD-10-CM

## 2018-07-17 DIAGNOSIS — I50.9 CONGESTIVE HEART FAILURE, UNSPECIFIED HF CHRONICITY, UNSPECIFIED HEART FAILURE TYPE: ICD-10-CM

## 2018-07-17 DIAGNOSIS — J44.9 CHRONIC OBSTRUCTIVE PULMONARY DISEASE, UNSPECIFIED COPD TYPE: ICD-10-CM

## 2018-07-17 DIAGNOSIS — D64.9 ANEMIA, UNSPECIFIED TYPE: ICD-10-CM

## 2018-07-17 DIAGNOSIS — Z12.31 ENCOUNTER FOR SCREENING MAMMOGRAM FOR BREAST CANCER: ICD-10-CM

## 2018-07-17 DIAGNOSIS — Z00.00 ROUTINE HEALTH MAINTENANCE: Primary | ICD-10-CM

## 2018-07-17 PROCEDURE — 3075F SYST BP GE 130 - 139MM HG: CPT | Mod: CPTII,S$GLB,, | Performed by: FAMILY MEDICINE

## 2018-07-17 PROCEDURE — 99214 OFFICE O/P EST MOD 30 MIN: CPT | Mod: S$GLB,,, | Performed by: FAMILY MEDICINE

## 2018-07-17 PROCEDURE — 99999 PR PBB SHADOW E&M-EST. PATIENT-LVL III: CPT | Mod: PBBFAC,,, | Performed by: FAMILY MEDICINE

## 2018-07-17 PROCEDURE — 3078F DIAST BP <80 MM HG: CPT | Mod: CPTII,S$GLB,, | Performed by: FAMILY MEDICINE

## 2018-07-17 PROCEDURE — 99499 UNLISTED E&M SERVICE: CPT | Mod: S$GLB,,, | Performed by: FAMILY MEDICINE

## 2018-07-17 RX ORDER — GLYBURIDE 5 MG/1
5 TABLET ORAL 2 TIMES DAILY WITH MEALS
Qty: 180 TABLET | Refills: 3 | Status: SHIPPED | OUTPATIENT
Start: 2018-07-17 | End: 2019-01-21 | Stop reason: SDUPTHER

## 2018-07-17 RX ORDER — ATORVASTATIN CALCIUM 20 MG/1
20 TABLET, FILM COATED ORAL DAILY
Qty: 90 TABLET | Refills: 3 | Status: SHIPPED | OUTPATIENT
Start: 2018-07-17 | End: 2019-01-21 | Stop reason: SDUPTHER

## 2018-07-17 NOTE — PROGRESS NOTES
"HPI  Latoya Helton is a 77 y.o. female with multiple medical diagnoses as listed in the medical history and problem list that presents for Annual Exam  .      HPI  Here today for routine health maintenance.    PAST MEDICAL HISTORY:  Past Medical History:   Diagnosis Date    Anticoagulant long-term use     ASA 81 mg    Arthritis     degenerative joint disease right knee    Cataract     OU    CHF (congestive heart failure)     in hospital Dec 2012    Chronic back pain     extending to left leg    Colon polyp     Complication of anesthesia     difficult to get IV access    COPD (chronic obstructive pulmonary disease) 12/2012    mild, exacerbation 2012    Coronary artery disease     denies MI, but has Hx angina (post-MI syndrome)    Degenerative disc disease     low back    Diabetes mellitus     type II    Dyspnea on exertion 6/13/13    saw cardiology for increasing symptoms, she is booked for angio in July 2013    Encounter for blood transfusion     GERD (gastroesophageal reflux disease)     Hyperlipidemia     Hypertension     Lower GI bleed 2012    internal hemorrhoids    Neuropathy in diabetes     ABEL (obstructive sleep apnea)     uses CPAP    Right trigger finger 2013    long finger    RLS (restless legs syndrome)        PAST SURGICAL HISTORY:  Past Surgical History:   Procedure Laterality Date    APPENDECTOMY      BACK SURGERY      Lumbar laminectomy    CARPAL TUNNEL RELEASE      right hand    CHOLECYSTECTOMY      CORONARY ANGIOPLASTY  1/6/2012    OM1 BMS 3.5 mm stent/ 2 stents    HAND SURGERY      Left/ repair of "broken" hand    HERNIA REPAIR      Hiatal    HYSTERECTOMY      SPINAL CORD STIMULATOR TRIAL W/ LAMINOTOMY  5/22/13    TRIGGER FINGER RELEASE  Jan 2013    right        SOCIAL HISTORY:  Social History     Social History    Marital status:      Spouse name: N/A    Number of children: N/A    Years of education: N/A     Occupational History    Not on file.     Social " History Main Topics    Smoking status: Former Smoker     Packs/day: 0.25     Years: 10.00     Quit date: 4/17/1980    Smokeless tobacco: Never Used      Comment: stopped smoking in the 80s    Alcohol use No    Drug use: No    Sexual activity: No     Other Topics Concern    Not on file     Social History Narrative    No narrative on file       FAMILY HISTORY:  Family History   Problem Relation Age of Onset    Heart disease Mother     Diabetes Mother     Heart attack Mother     Diabetes Sister     Heart disease Sister     Amblyopia Neg Hx     Blindness Neg Hx     Cancer Neg Hx     Cataracts Neg Hx     Glaucoma Neg Hx     Hypertension Neg Hx     Macular degeneration Neg Hx     Retinal detachment Neg Hx     Strabismus Neg Hx     Stroke Neg Hx     Thyroid disease Neg Hx        ALLERGIES AND MEDICATIONS: updated and reviewed.  Review of patient's allergies indicates:   Allergen Reactions    Ciprofloxacin Other (See Comments)     Other reaction(s): tendon rupture    Gabapentin Rash     Current Outpatient Prescriptions   Medication Sig Dispense Refill    albuterol (PROVENTIL) 2.5 mg /3 mL (0.083 %) nebulizer solution Take 3 mLs (2.5 mg total) by nebulization every 4 (four) hours as needed for Wheezing. 90 mL 3    alcohol swabs (BD ALCOHOL SWAB) PadM Apply 1 each topically once daily. 100 each 3    aspirin (ECOTRIN) 81 MG EC tablet Take 1 tablet (81 mg total) by mouth once daily. 90 tablet 3    beclomethasone (QVAR) 40 mcg/actuation Aero Inhale 1 puff into the lungs 2 (two) times daily. Not sure of dose       blood sugar diagnostic (BLOOD GLUCOSE TEST) Strp Microlet lancets & Contour Test strips. Test BID. 200 strip 3    CALCIUM CARBONATE ORAL Take 1,800 mg by mouth once daily.        carvedilol (COREG) 6.25 MG tablet Take 1 tablet (6.25 mg total) by mouth 2 (two) times daily. 180 tablet 1    DOCOSAHEXANOIC ACID/EPA (FISH OIL ORAL) Take 1 capsule by mouth once daily.      ERGOCALCIFEROL,  VITAMIN D2, (VITAMIN D ORAL) Take 1,000 mg by mouth once daily.        fluticasone-salmeterol 250-50 mcg/dose (ADVAIR DISKUS) 250-50 mcg/dose diskus inhaler Inhale 1 puff into the lungs 2 (two) times daily. 180 each 3    furosemide (LASIX) 40 MG tablet Take 1 tablet (40 mg total) by mouth 2 (two) times daily. 180 tablet 3    glyBURIDE (DIABETA) 5 MG tablet Take 1 tablet (5 mg total) by mouth 2 (two) times daily with meals. 180 tablet 3    iron-vitamin C 100-250 mg, ICAR-C, 100-250 mg Tab Take one tablet in the morning with an acidic drink before breakfast. 90 tablet 0    lancets (TRUEPLUS LANCETS) Misc Use daily 100 each 3    losartan (COZAAR) 50 MG tablet Take 1 tablet (50 mg total) by mouth once daily. 90 tablet 3    metformin (GLUCOPHAGE) 1000 MG tablet TAKE ONE TABLET BY MOUTH TWICE DAILY AFTER A MEAL 180 tablet 2    multivitamin-minerals-lutein (CENTRUM SILVER) Tab Take 1 tablet by mouth once daily. 90 tablet 3    nortriptyline (PAMELOR) 25 MG capsule TAKE ONE CAPSULE BY MOUTH ONCE DAILY IN  THE  [facility]. 90 capsule 2    omeprazole (PRILOSEC) 40 MG capsule Take 1 capsule (40 mg total) by mouth once daily. 90 capsule 3    potassium chloride SA (K-DUR) 20 MEQ tablet Take 1 tablet (20 mEq total) by mouth once daily. 90 tablet 3    atorvastatin (LIPITOR) 20 MG tablet Take 1 tablet (20 mg total) by mouth once daily. 90 tablet 3     No current facility-administered medications for this visit.        ROS  Review of Systems   Constitutional: Negative for fatigue, fever and unexpected weight change.   HENT: Negative for congestion, hearing loss, rhinorrhea and sore throat.    Eyes: Negative for visual disturbance.   Respiratory: Negative for cough, chest tightness, shortness of breath and wheezing.    Cardiovascular: Negative for chest pain, palpitations and leg swelling.   Gastrointestinal: Negative for abdominal distention, abdominal pain, blood in stool, constipation, diarrhea, nausea and vomiting.  "  Genitourinary: Negative for difficulty urinating, dysuria, frequency, hematuria, menstrual problem, pelvic pain, urgency and vaginal bleeding.   Musculoskeletal: Negative for back pain, joint swelling and neck pain.   Skin: Negative for rash.   Neurological: Negative for dizziness, tremors, weakness, light-headedness, numbness and headaches.   Psychiatric/Behavioral: Negative for confusion, dysphoric mood and sleep disturbance. The patient is not nervous/anxious.        Physical Exam  Vitals:    07/17/18 1154   BP: 136/78   Pulse: 67   Resp: 16    Body mass index is 36.29 kg/m².  Weight: 90 kg (198 lb 6.6 oz)   Height: 5' 2" (157.5 cm)     Physical Exam   Constitutional: She is oriented to person, place, and time. She appears well-developed and well-nourished.   HENT:   Head: Normocephalic and atraumatic.   Right Ear: External ear normal.   Left Ear: External ear normal.   Nose: Nose normal.   Mouth/Throat: Oropharynx is clear and moist.   Eyes: Conjunctivae and EOM are normal. Pupils are equal, round, and reactive to light. No scleral icterus.   Neck: Normal range of motion. Neck supple. No JVD present. No thyromegaly present.   Cardiovascular: Normal rate, regular rhythm and normal heart sounds.  Exam reveals no gallop and no friction rub.    No murmur heard.  Pulses:       Dorsalis pedis pulses are 1+ on the right side, and 1+ on the left side.   Pulmonary/Chest: Effort normal and breath sounds normal. She has no wheezes. She has no rales.   Abdominal: Soft. Bowel sounds are normal. She exhibits no distension and no mass. There is no tenderness. There is no rebound and no guarding. A hernia (RUQ ventral wall) is present.   Musculoskeletal: Normal range of motion. She exhibits no edema.        Right foot: There is no deformity.        Left foot: There is no deformity.   Feet:   Right Foot:   Protective Sensation: 4 sites tested. 4 sites sensed.   Skin Integrity: Negative for skin breakdown.   Left Foot: "   Protective Sensation: 4 sites tested. 4 sites sensed.   Skin Integrity: Negative for skin breakdown.   Lymphadenopathy:     She has no cervical adenopathy.   Neurological: She is alert and oriented to person, place, and time. She has normal strength. No cranial nerve deficit or sensory deficit.   Skin: Skin is warm and dry. No rash noted.   Vitals reviewed.      Health Maintenance       Date Due Completion Date    TETANUS VACCINE 12/28/1960 ---    High Dose Statin 12/28/1963 ---    Zoster Vaccine 12/28/2002 ---    Hemoglobin A1c 11/24/2017 5/24/2017    Override on 9/28/2015: Done    Eye Exam 04/26/2018 4/26/2017 (Done)    Override on 4/26/2017: Done (Dr Trinidad; negative retinopathy)    Override on 4/28/2015: Done    Override on 12/1/2014: Done (Juan Trinidad, OD (negative diabetic retinopathy))    Override on 7/19/2011: (N/S)    Lipid Panel 05/24/2018 5/24/2017    Foot Exam 07/03/2018 7/3/2017    Mammogram 05/25/2018 5/25/2017    Influenza Vaccine 08/01/2018 8/27/2017    Override on 8/27/2017: Done    DEXA SCAN 10/30/2018 10/30/2015    Colonoscopy 06/25/2024 6/25/2014    Override on 6/6/2011: (N/S)          Assessment & Plan    Routine health maintenance  - Health maintenance reviewed  - Diet and exercise education.    Uncontrolled type 2 diabetes mellitus with diabetic polyneuropathy, without long-term current use of insulin  -     Hemoglobin A1c; Future; Expected date: 07/17/2018  -     Comprehensive metabolic panel; Future; Expected date: 07/17/2018  -     Lipid panel; Future; Expected date: 07/17/2018  -     atorvastatin (LIPITOR) 20 MG tablet; Take 1 tablet (20 mg total) by mouth once daily.  Dispense: 90 tablet; Refill: 3  - Diet and exercise education.  - Serial glucose monitoring  - Continue current therapy    Chronic obstructive pulmonary disease, unspecified COPD type  - Stable, Continue current therapy    Congestive heart failure, unspecified HF chronicity, unspecified heart failure type  - Continue  current therapy  - Serial blood pressure monitoring  - Diet and exercise education.    Ventral hernia without obstruction or gangrene  -     Ambulatory referral to General Surgery    Encounter for screening mammogram for breast cancer  -     Mammo Digital Screening Bilat with CAD; Future; Expected date: 07/17/2018    Other orders  -     glyBURIDE (DIABETA) 5 MG tablet; Take 1 tablet (5 mg total) by mouth 2 (two) times daily with meals.  Dispense: 180 tablet; Refill: 3              Follow-up in about 6 months (around 1/17/2019).

## 2018-07-17 NOTE — PROGRESS NOTES
Patient, Latoya Helton (MRN #[id number]), presented with a recorded BMI of 36.29 kg/m^2 and a documented comorbidity(s):  - Diabetes Mellitus Type 2  - Atrial Fibrillation  to which the severe obesity is a contributing factor. This is consistent with the definition of severe obesity (BMI 35.0-35.9) with comorbidity (ICD-10 E66.01, Z68.35). The patient's severe obesity was monitored, evaluated, addressed and/or treated. This addendum to the medical record is made on 07/17/2018.

## 2018-07-24 DIAGNOSIS — M51.36 DDD (DEGENERATIVE DISC DISEASE), LUMBAR: Primary | ICD-10-CM

## 2018-07-25 ENCOUNTER — ANESTHESIA EVENT (OUTPATIENT)
Dept: SURGERY | Facility: HOSPITAL | Age: 78
End: 2018-07-25
Payer: MEDICARE

## 2018-07-26 ENCOUNTER — HOSPITAL ENCOUNTER (OUTPATIENT)
Dept: RADIOLOGY | Facility: HOSPITAL | Age: 78
Discharge: HOME | End: 2018-07-26
Attending: ANESTHESIOLOGY | Admitting: ANESTHESIOLOGY
Payer: MEDICARE

## 2018-07-26 ENCOUNTER — ANESTHESIA (OUTPATIENT)
Dept: SURGERY | Facility: HOSPITAL | Age: 78
End: 2018-07-26
Payer: MEDICARE

## 2018-07-26 ENCOUNTER — HOSPITAL ENCOUNTER (OUTPATIENT)
Facility: HOSPITAL | Age: 78
Discharge: HOME | End: 2018-07-26
Attending: ANESTHESIOLOGY | Admitting: ANESTHESIOLOGY
Payer: MEDICARE

## 2018-07-26 ENCOUNTER — SURGERY (OUTPATIENT)
Age: 78
End: 2018-07-26

## 2018-07-26 ENCOUNTER — TELEPHONE (OUTPATIENT)
Dept: PAIN MEDICINE | Facility: CLINIC | Age: 78
End: 2018-07-26

## 2018-07-26 VITALS
HEIGHT: 62 IN | RESPIRATION RATE: 18 BRPM | DIASTOLIC BLOOD PRESSURE: 74 MMHG | BODY MASS INDEX: 36.44 KG/M2 | OXYGEN SATURATION: 98 % | HEART RATE: 66 BPM | WEIGHT: 198 LBS | TEMPERATURE: 97 F | SYSTOLIC BLOOD PRESSURE: 160 MMHG

## 2018-07-26 DIAGNOSIS — G89.4 CHRONIC PAIN DISORDER: ICD-10-CM

## 2018-07-26 DIAGNOSIS — M51.36 DDD (DEGENERATIVE DISC DISEASE), LUMBAR: ICD-10-CM

## 2018-07-26 DIAGNOSIS — Z45.42 BATTERY END OF LIFE OF SPINAL CORD STIMULATOR: Primary | ICD-10-CM

## 2018-07-26 LAB — GLUCOSE SERPL-MCNC: 189 MG/DL (ref 70–110)

## 2018-07-26 PROCEDURE — 63600175 PHARM REV CODE 636 W HCPCS: Mod: PO | Performed by: ANESTHESIOLOGY

## 2018-07-26 PROCEDURE — 82962 GLUCOSE BLOOD TEST: CPT | Mod: PO | Performed by: ANESTHESIOLOGY

## 2018-07-26 PROCEDURE — 36000707: Mod: PO | Performed by: ANESTHESIOLOGY

## 2018-07-26 PROCEDURE — 37000008 HC ANESTHESIA 1ST 15 MINUTES: Mod: PO | Performed by: ANESTHESIOLOGY

## 2018-07-26 PROCEDURE — C1767 GENERATOR, NEURO NON-RECHARG: HCPCS | Mod: PO | Performed by: ANESTHESIOLOGY

## 2018-07-26 PROCEDURE — 36000706: Mod: PO | Performed by: ANESTHESIOLOGY

## 2018-07-26 PROCEDURE — 37000009 HC ANESTHESIA EA ADD 15 MINS: Mod: PO | Performed by: ANESTHESIOLOGY

## 2018-07-26 PROCEDURE — D9220A PRA ANESTHESIA: Mod: CRNA,,, | Performed by: NURSE ANESTHETIST, CERTIFIED REGISTERED

## 2018-07-26 PROCEDURE — 63600175 PHARM REV CODE 636 W HCPCS: Mod: PO | Performed by: NURSE ANESTHETIST, CERTIFIED REGISTERED

## 2018-07-26 PROCEDURE — D9220A PRA ANESTHESIA: Mod: ANES,,, | Performed by: ANESTHESIOLOGY

## 2018-07-26 PROCEDURE — 25000003 PHARM REV CODE 250: Mod: PO | Performed by: ANESTHESIOLOGY

## 2018-07-26 PROCEDURE — 63685 INS/RPLC SPI NPG/RCVR POCKET: CPT | Mod: ,,, | Performed by: ANESTHESIOLOGY

## 2018-07-26 PROCEDURE — 76000 FLUOROSCOPY <1 HR PHYS/QHP: CPT | Mod: TC,PO

## 2018-07-26 PROCEDURE — 71000033 HC RECOVERY, INTIAL HOUR: Mod: PO | Performed by: ANESTHESIOLOGY

## 2018-07-26 DEVICE — PROCLAIM ELITE 7 PATIENT CTRL: Type: IMPLANTABLE DEVICE | Site: BACK | Status: FUNCTIONAL

## 2018-07-26 RX ORDER — FENTANYL CITRATE 50 UG/ML
25 INJECTION, SOLUTION INTRAMUSCULAR; INTRAVENOUS EVERY 5 MIN PRN
Status: DISCONTINUED | OUTPATIENT
Start: 2018-07-26 | End: 2018-07-26 | Stop reason: HOSPADM

## 2018-07-26 RX ORDER — OXYCODONE HYDROCHLORIDE 5 MG/1
5 TABLET ORAL ONCE AS NEEDED
Status: DISCONTINUED | OUTPATIENT
Start: 2018-07-26 | End: 2018-07-26 | Stop reason: HOSPADM

## 2018-07-26 RX ORDER — CEFAZOLIN SODIUM 1 G/3ML
2 INJECTION, POWDER, FOR SOLUTION INTRAMUSCULAR; INTRAVENOUS
Status: COMPLETED | OUTPATIENT
Start: 2018-07-26 | End: 2018-07-26

## 2018-07-26 RX ORDER — MIDAZOLAM HYDROCHLORIDE 1 MG/ML
INJECTION, SOLUTION INTRAMUSCULAR; INTRAVENOUS
Status: DISCONTINUED | OUTPATIENT
Start: 2018-07-26 | End: 2018-07-26

## 2018-07-26 RX ORDER — LIDOCAINE HYDROCHLORIDE 10 MG/ML
1 INJECTION, SOLUTION EPIDURAL; INFILTRATION; INTRACAUDAL; PERINEURAL ONCE
Status: DISCONTINUED | OUTPATIENT
Start: 2018-07-26 | End: 2018-07-26 | Stop reason: HOSPADM

## 2018-07-26 RX ORDER — BACITRACIN 50000 [IU]/1
INJECTION, POWDER, FOR SOLUTION INTRAMUSCULAR
Status: DISCONTINUED | OUTPATIENT
Start: 2018-07-26 | End: 2018-07-26 | Stop reason: HOSPADM

## 2018-07-26 RX ORDER — FENTANYL CITRATE 50 UG/ML
INJECTION, SOLUTION INTRAMUSCULAR; INTRAVENOUS
Status: DISCONTINUED | OUTPATIENT
Start: 2018-07-26 | End: 2018-07-26

## 2018-07-26 RX ORDER — LIDOCAINE HCL/PF 100 MG/5ML
SYRINGE (ML) INTRAVENOUS
Status: DISCONTINUED | OUTPATIENT
Start: 2018-07-26 | End: 2018-07-26

## 2018-07-26 RX ORDER — BUPIVACAINE HYDROCHLORIDE 2.5 MG/ML
INJECTION, SOLUTION EPIDURAL; INFILTRATION; INTRACAUDAL
Status: DISCONTINUED | OUTPATIENT
Start: 2018-07-26 | End: 2018-07-26 | Stop reason: HOSPADM

## 2018-07-26 RX ORDER — PROPOFOL 10 MG/ML
VIAL (ML) INTRAVENOUS CONTINUOUS PRN
Status: DISCONTINUED | OUTPATIENT
Start: 2018-07-26 | End: 2018-07-26

## 2018-07-26 RX ORDER — HYDROCODONE BITARTRATE AND ACETAMINOPHEN 7.5; 325 MG/1; MG/1
1 TABLET ORAL EVERY 6 HOURS PRN
Qty: 20 TABLET | Refills: 0 | Status: SHIPPED | OUTPATIENT
Start: 2018-07-26 | End: 2018-07-27 | Stop reason: SDUPTHER

## 2018-07-26 RX ORDER — LIDOCAINE HYDROCHLORIDE AND EPINEPHRINE 10; 10 MG/ML; UG/ML
INJECTION, SOLUTION INFILTRATION; PERINEURAL
Status: DISCONTINUED | OUTPATIENT
Start: 2018-07-26 | End: 2018-07-26 | Stop reason: HOSPADM

## 2018-07-26 RX ORDER — SODIUM CHLORIDE, SODIUM LACTATE, POTASSIUM CHLORIDE, CALCIUM CHLORIDE 600; 310; 30; 20 MG/100ML; MG/100ML; MG/100ML; MG/100ML
INJECTION, SOLUTION INTRAVENOUS CONTINUOUS
Status: DISCONTINUED | OUTPATIENT
Start: 2018-07-26 | End: 2018-07-26 | Stop reason: HOSPADM

## 2018-07-26 RX ORDER — SODIUM CHLORIDE 0.9 % (FLUSH) 0.9 %
3 SYRINGE (ML) INJECTION
Status: DISCONTINUED | OUTPATIENT
Start: 2018-07-26 | End: 2018-07-26 | Stop reason: HOSPADM

## 2018-07-26 RX ADMIN — LIDOCAINE HYDROCHLORIDE AND EPINEPHRINE 6 ML: 10; 10 INJECTION, SOLUTION INFILTRATION; PERINEURAL at 12:07

## 2018-07-26 RX ADMIN — CEFAZOLIN 2 G: 1 INJECTION, POWDER, FOR SOLUTION INTRAVENOUS at 11:07

## 2018-07-26 RX ADMIN — LIDOCAINE HYDROCHLORIDE 100 MG: 20 INJECTION PARENTERAL at 11:07

## 2018-07-26 RX ADMIN — BACITRACIN 50000 UNITS: 5000 INJECTION, POWDER, FOR SOLUTION INTRAMUSCULAR at 11:07

## 2018-07-26 RX ADMIN — BUPIVACAINE HYDROCHLORIDE 10 ML: 2.5 INJECTION, SOLUTION EPIDURAL; INFILTRATION; INTRACAUDAL; PERINEURAL at 11:07

## 2018-07-26 RX ADMIN — FENTANYL CITRATE 50 MCG: 50 INJECTION, SOLUTION INTRAMUSCULAR; INTRAVENOUS at 11:07

## 2018-07-26 RX ADMIN — MIDAZOLAM HYDROCHLORIDE 1 MG: 1 INJECTION, SOLUTION INTRAMUSCULAR; INTRAVENOUS at 11:07

## 2018-07-26 RX ADMIN — SODIUM CHLORIDE, SODIUM LACTATE, POTASSIUM CHLORIDE, AND CALCIUM CHLORIDE: .6; .31; .03; .02 INJECTION, SOLUTION INTRAVENOUS at 11:07

## 2018-07-26 RX ADMIN — PROPOFOL 50 MCG/KG/MIN: 10 INJECTION, EMULSION INTRAVENOUS at 11:07

## 2018-07-26 NOTE — ANESTHESIA PREPROCEDURE EVALUATION
07/26/2018  Latoya Helton is a 77 y.o., female.    Anesthesia Evaluation      I have reviewed the Medications.     Review of Systems  Anesthesia Hx:  No problems with previous Anesthesia   Social:  Non-Smoker, No Alcohol Use    Cardiovascular:   Hypertension CAD  CABG/stent  CHF    Pulmonary:   COPD Sleep Apnea    Renal/:  Renal/ Normal     Hepatic/GI:   GERD    Musculoskeletal:   Arthritis     Neurological:   RLS  Chronic Pain Syndrome  Peripheral Neuropathy    Endocrine:   Diabetes        Physical Exam  General:  Obesity    Airway/Jaw/Neck:  Airway Findings: Mouth Opening: Normal General Airway Assessment: Adult  Mallampati: II  Jaw/Neck Findings:  Neck ROM: Extension Decreased, Mild       Chest/Lungs:  Chest/Lungs Findings: Clear to auscultation, Normal Respiratory Rate     Heart/Vascular:  Heart Findings: Rate: Normal  Rhythm: Regular Rhythm  Sounds: Normal  Heart murmur: negative Vascular Findings: Normal (No carotid bruits.)       Mental Status:  Mental Status Findings:  Cooperative, Alert and Oriented         Anesthesia Plan  Type of Anesthesia, risks & benefits discussed:  Anesthesia Type:  MAC  Patient's Preference:   Intra-op Monitoring Plan:   Intra-op Monitoring Plan Comments:   Post Op Pain Control Plan:   Post Op Pain Control Plan Comments:   Induction:   IV  Beta Blocker:  Patient is on a Beta-Blocker and has received one dose within the past 24 hours (No further documentation required).       Informed Consent: Patient understands risks and agrees with Anesthesia plan.  Questions answered. Anesthesia consent signed with patient.  ASA Score: 3     Day of Surgery Review of History & Physical:            Ready For Surgery From Anesthesia Perspective.

## 2018-07-26 NOTE — ANESTHESIA POSTPROCEDURE EVALUATION
"Anesthesia Post Evaluation    Patient: Latoya Helton    Procedure(s) Performed: Procedure(s) (LRB):  [id number] (N/A)    Final Anesthesia Type: MAC  Patient location during evaluation: PACU  Patient participation: Yes- Able to Participate  Level of consciousness: awake and alert  Post-procedure vital signs: reviewed and stable  Pain management: adequate  Airway patency: patent  PONV status at discharge: No PONV  Anesthetic complications: no      Cardiovascular status: blood pressure returned to baseline and hemodynamically stable  Respiratory status: unassisted, spontaneous ventilation and room air  Hydration status: euvolemic  Follow-up not needed.        Visit Vitals  BP (!) 160/74 (BP Location: Left arm, Patient Position: Lying)   Pulse 66   Temp 36.3 °C (97.4 °F) (Skin)   Resp 18   Ht 5' 2" (1.575 m)   Wt 89.8 kg (198 lb)   SpO2 98%   Breastfeeding? No   BMI 36.21 kg/m²       Pain/Martin Score: Pain Assessment Performed: Yes (7/26/2018 12:59 PM)  Presence of Pain: denies (7/26/2018 12:59 PM)  Martin Score: 10 (7/26/2018 12:59 PM)      "

## 2018-07-26 NOTE — TELEPHONE ENCOUNTER
----- Message from Mark Dobbins sent at 7/25/2018  5:45 PM CDT -----  Contact: Pt   Pt would like a call back regarding rescheduling appointment due to transportation issues pt insist on message being sent       Pt can be reached at [phone]

## 2018-07-26 NOTE — OP NOTE
PROCEDURE DATE: 7/26/2018    PROCEDURE:   1. Spinal Cord Stimulator IPG exchange    Pre-op diagnosis:  1. Chronic pain syndrome  2. End of battery life    Post-op diagnosis: Same    Surgeon: Dr. Dejan Alarcon    Assistant: None     Anesthesia: Monitored Anesthesia Care    Estimated Blood Loss: Minimal- <10ml    Urine Output: Not Measured    IV Fluids- See Anesthesia record    Complications: none    CONSENT: The risks, benefits, and options were discussed thoroughly with the patient. The patient's questions were answered. The patient understands the risk and benefits and wishes to proceed. Informed consent was obtained.     Technique:  Site of the implantable pulse generator was marked on the patients left side in the preoperative area. The patient was taken to the OR and placed in the prone position. The anesthesia provider throughout the case provided sedation and cardiopulmonary monitoring.   The Patient's back was prepped with Duraprep and then draped in usual sterile fashion. Local anesthetic was used at the IPG site with 1% lidocaine with epinephrine 1:100,000. Using a No. 10 scalpel, an incision was made over the left buttock scar from previous IPG placement and dissection carried out with blunt dissection. The battery was accessed and removed from pocket still attached to leads. The leads were removed from the old battery and then placed in exact location of the new battery. Then the system was checked by device representative in sterile fashion, found to be completely functional. Copious antibiotic bulb lavage was irrigated throughout the field and pocket, and hemostasis was maintained. The battery was then placed in the pocket.    Then the wound was closed with simple interrupted sutures using 0-0 vicryl and 2-0 vicryl sutures in two layers and skin closed with 4-0 monocryl. Bacitracin was placed over the incision sites and dressings applied. Sponge and needle counts were correct x2 at conclusion of the  case.     Patient was then placed supine on the stretcher and transferred to the recovery room. Patient was programmed by the representative of the SCS. Patient was instructed not to perform any abrupt movements with the back, avoid bending, twisting, or lifting >10lbs. Thee patient has been scheduled to return to the clinic in approx 5-7 days. The patient tolerated the procedure well.

## 2018-07-26 NOTE — DISCHARGE SUMMARY
[facility]  Discharge Note  Short Stay    Admit Date: 7/26/2018    Discharge Date: 7/26/2018    Attending Physician: Dejan Alarcon MD     Discharge Provider: Dejan Alarcon    Diagnoses:  Active Hospital Problems    Diagnosis  POA    *Chronic pain disorder [G89.4]  Yes      Resolved Hospital Problems    Diagnosis Date Resolved POA   No resolved problems to display.       Discharged Condition: good    Final Diagnoses: Chronic pain disorder [G89.4]    Disposition: Home or Self Care    Hospital Course: no complications, uneventful    Outcome of Hospitalization, Treatment, Procedure, or Surgery:  Patient was admitted for outpatient procedure. The patient underwent procedure without complications and are discharged home    Follow up/Patient Instructions:  Follow up as scheduled in Pain Management clinic in 3-4 weeks/Patient has received instructions and follow up date and time    Medications:  Continue previous medications      Discharge Procedure Orders  Call MD for:  temperature >100.4     Call MD for:  severe uncontrolled pain     Call MD for:  redness, tenderness, or signs of infection (pain, swelling, redness, odor or green/yellow discharge around incision site)     Call MD for:  severe persistent headache     No dressing needed           Discharge Procedure Orders (must include Diet, Follow-up, Activity):    Discharge Procedure Orders (must include Diet, Follow-up, Activity)  Call MD for:  temperature >100.4     Call MD for:  severe uncontrolled pain     Call MD for:  redness, tenderness, or signs of infection (pain, swelling, redness, odor or green/yellow discharge around incision site)     Call MD for:  severe persistent headache     No dressing needed

## 2018-07-26 NOTE — DISCHARGE INSTRUCTIONS
Discharge Instructions: After Your Surgery  Youve just had surgery. During surgery, you were given medicine called anesthesia to keep you relaxed and free of pain. After surgery, you may have some pain or nausea. This is common. Here are some tips for feeling better and getting well after surgery.     Stay on schedule with your medicine.   Going home  Your healthcare provider will show you how to take care of yourself when you go home. He or she will also answer your questions. Have an adult family member or friend drive you home. For the first 24 hours after your surgery:  · Do not drive or use heavy equipment.  · Do not make important decisions or sign legal papers.  · Do not drink alcohol.  · Have someone stay with you, if needed. He or she can watch for problems and help keep you safe.  Be sure to go to all follow-up visits with your healthcare provider. And rest after your surgery for as long as your healthcare provider tells you to.  Coping with pain  If you have pain after surgery, pain medicine will help you feel better. Take it as told, before pain becomes severe. Also, ask your healthcare provider or pharmacist about other ways to control pain. This might be with heat, ice, or relaxation. And follow any other instructions your surgeon or nurse gives you.  Tips for taking pain medicine  To get the best relief possible, remember these points:  · Pain medicines can upset your stomach. Taking them with a little food may help.  · Most pain relievers taken by mouth need at least 20 to 30 minutes to start to work.  · Taking medicine on a schedule can help you remember to take it. Try to time your medicine so that you can take it before starting an activity. This might be before you get dressed, go for a walk, or sit down for dinner.  · Constipation is a common side effect of pain medicines. Call your healthcare provider before taking any medicines such as laxatives or stool softeners to help ease constipation.  Also ask if you should skip any foods. Drinking lots of fluids and eating foods such as fruits and vegetables that are high in fiber can also help. Remember, do not take laxatives unless your surgeon has prescribed them.  · Drinking alcohol and taking pain medicine can cause dizziness and slow your breathing. It can even be deadly. Do not drink alcohol while taking pain medicine.  · Pain medicine can make you react more slowly to things. Do not drive or run machinery while taking pain medicine.  Your healthcare provider may tell you to take acetaminophen to help ease your pain. Ask him or her how much you are supposed to take each day. Acetaminophen or other pain relievers may interact with your prescription medicines or other over-the-counter (OTC) medicines. Some prescription medicines have acetaminophen and other ingredients. Using both prescription and OTC acetaminophen for pain can cause you to overdose. Read the labels on your OTC medicines with care. This will help you to clearly know the list of ingredients, how much to take, and any warnings. It may also help you not take too much acetaminophen. If you have questions or do not understand the information, ask your pharmacist or healthcare provider to explain it to you before you take the OTC medicine.  Managing nausea  Some people have an upset stomach after surgery. This is often because of anesthesia, pain, or pain medicine, or the stress of surgery. These tips will help you handle nausea and eat healthy foods as you get better. If you were on a special food plan before surgery, ask your healthcare provider if you should follow it while you get better. These tips may help:  · Do not push yourself to eat. Your body will tell you when to eat and how much.  · Start off with clear liquids and soup. They are easier to digest.  · Next try semi-solid foods, such as mashed potatoes, applesauce, and gelatin, as you feel ready.  · Slowly move to solid foods. Dont  eat fatty, rich, or spicy foods at first.  · Do not force yourself to have 3 large meals a day. Instead eat smaller amounts more often.  · Take pain medicines with a small amount of solid food, such as crackers or toast, to avoid nausea.     Call your surgeon if  · You still have pain an hour after taking medicine. The medicine may not be strong enough.  · You feel too sleepy, dizzy, or groggy. The medicine may be too strong.  · You have side effects like nausea, vomiting, or skin changes, such as rash, itching, or hives.       If you have obstructive sleep apnea  You were given anesthesia medicine during surgery to keep you comfortable and free of pain. After surgery, you may have more apnea spells because of this medicine and other medicines you were given. The spells may last longer than usual.   At home:  · Keep using the continuous positive airway pressure (CPAP) device when you sleep. Unless your healthcare provider tells you not to, use it when you sleep, day or night. CPAP is a common device used to treat obstructive sleep apnea.  · Talk with your provider before taking any pain medicine, muscle relaxants, or sedatives. Your provider will tell you about the possible dangers of taking these medicines.  Date Last Reviewed: 12/1/2016 © 2000-2017 [employer]. [address] [place] [address], [place] [address]. All rights reserved. This information is not intended as a substitute for professional medical care. Always follow your healthcare professional's instructions.          SPINAL CORD STIMULATOR  BATTERY CHANGE  Please follow all of the instructions that were given to you and demonstrated by your  Representative about the use and care of your remote and equipment.    Please contact your representative for any questions about restrictions or to offer any support you may need on the care and operation of your spinal cord stimulator device.     After your procedure:    -Sponge baths only .  -Keep your  bandage clean and dry.  -Do not remove your dressing until your follow up visit.   -Please limit any necessary bending, lifting or twisting to slow and careful movements    for the next 6 weeks or as directed by your doctor.  -During these 6 weeks, also avoid overhead work. Keep your elbows below your  shoulders.  -During these 6 weeks, wear your brace when you are walking or active. You do not have to wear the brace  while you are in bed.  -Turn off your stimulator when driving. (Do not drive for the first 24 hours after your  procedure)      -You may resume your home medications as prescribed.  -You may resume your normal diet as tolerated.  -Follow up as scheduled with Dr. Alarcon.       For programming or any questions you may have about your spinal cord stimulator, please call your  Representative:

## 2018-07-26 NOTE — H&P
"CC: Back and leg pain, SCS battery end of life    HPI: The patient is a 78yo woman with a history of chronic pain syndrome, lumbar radiculitis, SCS battery end of life here for battery exchange. There are no major changes in history and physical from 5/15/18.    Past Medical History:   Diagnosis Date    Anticoagulant long-term use     ASA 81 mg    Arthritis     degenerative joint disease right knee    Cataract     OU    CHF (congestive heart failure)     in hospital Dec 2012    Chronic back pain     extending to left leg    Colon polyp     Complication of anesthesia     difficult to get IV access    COPD (chronic obstructive pulmonary disease) 12/2012    mild, exacerbation 2012    Coronary artery disease     denies MI, but has Hx angina (post-MI syndrome)    Degenerative disc disease     low back    Diabetes mellitus     type II    Dyspnea on exertion 6/13/13    saw cardiology for increasing symptoms, she is booked for angio in July 2013    Encounter for blood transfusion     GERD (gastroesophageal reflux disease)     Hyperlipidemia     Hypertension     Lower GI bleed 2012    internal hemorrhoids    Neuropathy in diabetes     ABEL (obstructive sleep apnea)     uses CPAP    Right trigger finger 2013    long finger    RLS (restless legs syndrome)        Past Surgical History:   Procedure Laterality Date    APPENDECTOMY      BACK SURGERY      Lumbar laminectomy    CARPAL TUNNEL RELEASE      right hand    CHOLECYSTECTOMY      CORONARY ANGIOPLASTY  1/6/2012    OM1 BMS 3.5 mm stent/ 2 stents    HAND SURGERY      Left/ repair of "broken" hand    HERNIA REPAIR      Hiatal    HYSTERECTOMY      SPINAL CORD STIMULATOR TRIAL W/ LAMINOTOMY  5/22/13    TRIGGER FINGER RELEASE  Jan 2013    right        Family History   Problem Relation Age of Onset    Heart disease Mother     Diabetes Mother     Heart attack Mother     Diabetes Sister     Heart disease Sister     Amblyopia Neg Hx     Blindness " Neg Hx     Cancer Neg Hx     Cataracts Neg Hx     Glaucoma Neg Hx     Hypertension Neg Hx     Macular degeneration Neg Hx     Retinal detachment Neg Hx     Strabismus Neg Hx     Stroke Neg Hx     Thyroid disease Neg Hx        Social History     Social History    Marital status:      Spouse name: N/A    Number of children: N/A    Years of education: N/A     Social History Main Topics    Smoking status: Former Smoker     Packs/day: 0.25     Years: 10.00     Quit date: 4/17/1980    Smokeless tobacco: Never Used      Comment: stopped smoking in the 80s    Alcohol use No    Drug use: No    Sexual activity: No     Other Topics Concern    None     Social History Narrative    None       Current Facility-Administered Medications   Medication Dose Route Frequency Provider Last Rate Last Dose    ceFAZolin injection 2 g  2 g Intravenous On Call Procedure Dejan Alarcon MD        lactated ringers infusion   Intravenous Continuous Dwayne Enrique MD        lactated ringers infusion   Intravenous Continuous Dejan Alarcon MD        lidocaine (PF) 10 mg/ml (1%) injection 10 mg  1 mL Intradermal Once Dwayne Enrique MD           Review of patient's allergies indicates:   Allergen Reactions    Ciprofloxacin Other (See Comments)     Other reaction(s): tendon rupture    Gabapentin Rash       Vitals:    07/26/18 1115   BP: (!) 172/76   Pulse: 76   Resp: 18   Temp: 98.1 °F (36.7 °C)   TempSrc: Skin   SpO2: (!) 93%       REVIEW OF SYSTEMS:     GENERAL: No weight loss, malaise or fevers.  HEENT:  No recent changes in vision or hearing  NECK: Negative for lumps, no difficulty with swallowing.  RESPIRATORY: Negative for cough, wheezing or shortness of breath, patient denies any recent URI.  CARDIOVASCULAR: Negative for chest pain, leg swelling or palpitations.  GI: Negative for abdominal discomfort, blood in stools or black stools or change in bowel habits.  MUSCULOSKELETAL: See HPI.  SKIN:  Negative for lesions, rash, and itching.  PSYCH: No suicidal or homicidal ideations, no current mood disturbances.  HEMATOLOGY/LYMPHOLOGY: Negative for prolonged bleeding, bruising easily or swollen nodes. Patient is not currently taking any anti-coagulants  ENDO: No history of diabetes or thyroid dysfunction  NEURO: No history of syncope, paralysis, seizures or tremors.All other reviewed and negative other than HPI.    Physical exam:  Gen: A and O x3, pleasant, well-groomed  Skin: No rashes or obvious lesions  HEENT: PERRLA, no obvious deformities on ears or in canals. No thyroid masses, trachea midline, no palpable lymph nodes in neck, axilla.  CVS: Regular rate and rhythm, normal S1 and S2, no murmurs.  Resp: Clear to auscultation bilaterally.  Abdomen: Soft, NT/ND, normal bowel sounds present.  Musculoskeletal/Neuro: Moving all extremities    Assessment:  Chronic pain disorder  -     Case Request Operating Room: [id number]  -     Activity as tolerated; Standing  -     Place in Outpatient; Standing  -     Diet NPO; Standing  -     lactated ringers infusion; Inject into the vein continuous.  -     ceFAZolin injection 2 g; Inject 2,000 mg (2 g total) into the vein On call Procedure (Surgery).  -     Notify physician ; Standing  -     Notify physician ; Standing  -     Notify physician (specify); Standing  -     Place 18-22 gauage peripheral IV ; Standing  -     Verify informed consent; Standing  -     Vital signs; Standing    Other orders  -     Vital signs; Standing  -     Insert peripheral IV; Standing  -     Use 1% lidocaine at IV site; Standing  -     Notify Physician - Potential Need of Opioid Reversal; Standing  -     Cancel: Diet NPO Except for: Medication; Standing  -     lactated ringers infusion; Inject into the vein continuous.  -     lidocaine (PF) 10 mg/ml (1%) injection 10 mg; Inject 1 mL (10 mg total) into the skin once.  -     Notify Anesthesiologist if Patient on Home Insulin  Pump; Standing  -     Pulse Oximetry Q4H; Standing

## 2018-07-26 NOTE — TRANSFER OF CARE
"Anesthesia Transfer of Care Note    Patient: Latoya Helton    Procedure(s) Performed: Procedure(s) (LRB):  [id number] (N/A)    Patient location: PACU    Anesthesia Type: MAC    Transport from OR: Transported from OR on room air with adequate spontaneous ventilation    Post pain: adequate analgesia    Post assessment: no apparent anesthetic complications    Post vital signs: stable    Level of consciousness: awake    Nausea/Vomiting: no nausea/vomiting    Complications: none    Transfer of care protocol was followed      Last vitals:   Visit Vitals  BP (!) 172/76 (BP Location: Right arm, Patient Position: Lying)   Pulse 76   Temp 36.7 °C (98.1 °F) (Skin)   Resp 18   Ht 5' 2" (1.575 m)   Wt 89.8 kg (198 lb)   SpO2 (!) 93%   Breastfeeding? No   BMI 36.21 kg/m²     "

## 2018-07-27 ENCOUNTER — NURSE TRIAGE (OUTPATIENT)
Dept: ADMINISTRATIVE | Facility: CLINIC | Age: 78
End: 2018-07-27

## 2018-07-27 ENCOUNTER — TELEPHONE (OUTPATIENT)
Dept: PAIN MEDICINE | Facility: CLINIC | Age: 78
End: 2018-07-27

## 2018-07-27 RX ORDER — HYDROCODONE BITARTRATE AND ACETAMINOPHEN 7.5; 325 MG/1; MG/1
1 TABLET ORAL EVERY 6 HOURS PRN
Qty: 20 TABLET | Refills: 0 | Status: SHIPPED | OUTPATIENT
Start: 2018-07-27 | End: 2018-08-27 | Stop reason: SDUPTHER

## 2018-07-27 NOTE — TELEPHONE ENCOUNTER
----- Message from Chari Rogers sent at 7/27/2018  4:36 PM CDT -----  Contact: Self  Call placed to POD   Patient states she is at the pharmacy and her pain medication was not called in and she just had surgery     Please call back [phone] (home)

## 2018-07-27 NOTE — TELEPHONE ENCOUNTER
pt states she had surgery yesterday 7/26 and no one called her pain rx in. Severe post op pain. No pain med last pm. Sister has paper rx. Pt states she received a call and rx has been handled. Pt currently getting filed at pharmacy.     Reason for Disposition   Caller has already spoken with another triager or PCP AND has further questions AND triager able to answer questions.    Protocols used: ST NO CONTACT OR DUPLICATE CONTACT CALL-A-AH

## 2018-08-02 ENCOUNTER — OFFICE VISIT (OUTPATIENT)
Dept: PAIN MEDICINE | Facility: CLINIC | Age: 78
End: 2018-08-02
Payer: MEDICARE

## 2018-08-02 VITALS
WEIGHT: 201.19 LBS | RESPIRATION RATE: 20 BRPM | HEART RATE: 74 BPM | DIASTOLIC BLOOD PRESSURE: 70 MMHG | BODY MASS INDEX: 36.79 KG/M2 | OXYGEN SATURATION: 96 % | SYSTOLIC BLOOD PRESSURE: 150 MMHG | TEMPERATURE: 97 F

## 2018-08-02 DIAGNOSIS — M96.1 POSTLAMINECTOMY SYNDROME OF LUMBAR REGION: ICD-10-CM

## 2018-08-02 DIAGNOSIS — G89.4 CHRONIC PAIN DISORDER: Primary | ICD-10-CM

## 2018-08-02 DIAGNOSIS — M54.16 LUMBAR RADICULITIS: ICD-10-CM

## 2018-08-02 PROCEDURE — 3078F DIAST BP <80 MM HG: CPT | Mod: CPTII,S$GLB,, | Performed by: PHYSICIAN ASSISTANT

## 2018-08-02 PROCEDURE — 3077F SYST BP >= 140 MM HG: CPT | Mod: CPTII,S$GLB,, | Performed by: PHYSICIAN ASSISTANT

## 2018-08-02 PROCEDURE — 99024 POSTOP FOLLOW-UP VISIT: CPT | Mod: S$GLB,,, | Performed by: PHYSICIAN ASSISTANT

## 2018-08-02 PROCEDURE — 99999 PR PBB SHADOW E&M-EST. PATIENT-LVL V: CPT | Mod: PBBFAC,,, | Performed by: PHYSICIAN ASSISTANT

## 2018-08-02 NOTE — PROGRESS NOTES
CC: Back pain    HPI: Patient is a 77-year-old female with a history of type 2 diabetes, ABEL, hypertension, and lumbar spondylosis and degenerative disc disease who presents for followup of bilateral low back pain and left leg radicular pain.  She is status post IPG exchange with [employer] on 07/26/2018.  The patient reports 100% relief of her pain. She reports some IPG site tenderness and discomfort but overall is doing well.  She denies weakness, numbness, bladder or bowel incontinence.  She denies fever or chills.    Pain Intervention history: She is status post left L3 transforaminal steroid injection on 4/25/13 and reports having moderate relief but only lasting about one week. Caudal JERI 9/13/10 with about 50% relief lasting one month.  She's been followed by Dr. Mendiola in the past and has undergone multiple interventions without benefit. Interventions include left-sided L4 and L5 medial branch radiofrequency ablation, left-sided L4/5 and L5/S1 facet injections, and both L2 and L3 selective nerve root steroid injections. She status post spinal cord stimulator implantation on 6/17/13. As far as medications, she has taken Elavil and meloxicam previously which did not provide benefit. She tried tramadol which seem to be effective initially but lost effectiveness. She developed rash with gabapentin. She is status post left L2 transforaminal epidural steroid injection on 4/23/14 with almost 100% relief lasting a couple weeks, now reporting a mild return in her pain.  She is unable to take NSAIDs as per her primary care physician.  She is status post IPG exchange with [employer] on 07/26/2018.     REVIEW OF SYSTEMS: Negative for fevers, chills, nausea, vomiting, diarrhea, chest pain, bowel or bladder incontinence, lower extremity weakness, positive for weight gain, cough, diabetes and back pain.     Medical, surgical, social, family history reviewed elsewhere in document.    Allg/Med: see med card.    Vitals:    08/02/18  1419   BP: (!) 150/70   Pulse: 74   Resp: 20   Temp: 97.3 °F (36.3 °C)   TempSrc: Oral   SpO2: 96%   Weight: 91.3 kg (201 lb 2.7 oz)   PainSc: 0-No pain         Physical Exam:  Gen: A and O x3, pleasant, well-groomed  Skin: No rashes or obvious lesions.   HEENT: PERRLA  CVS: Regular rate and rhythm, normal S1 and S2, no murmurs.  Resp: Clear to auscultation bilaterally, no wheezes or rales.  Abdomen: Soft, NT/ND, normal bowel sounds present.  Musculoskeletal: Able to heel walk, toe walk.     Neuro:  Lower extremities: 5/5 strength bilaterally  Reflexes: Patellar 1 +, Achilles 1 +.  Sensory: Intact and symmetrical to light touch and pinprick in L2-S1 dermatomes bilaterally except in the left L3 distribution.    IPG incision site clean and dry, no erythema, induration or drainage.    Imagin13 MRI L-spine  T12-L1: No central canal or neuroforaminal stenosis. No disc protrusion or extrusion.  L1-L2: No central canal or neuroforaminal stenosis. No disc protrusion or extrusion.  L2-L3: There is a grade I retrolisthesis with uncovering of the intervertebral disk and a superimposed broad based disk bulge. There is mild overall central canal stenosis. There is severe left neuroforaminal stenosis with probable encroachment upon the exiting left L2 nerve as a result of the aforementioned scoliotic curvature. No right neuroforaminal stenosis.  L3-L4: No central canal or neuroforaminal stenosis. No disc protrusion or extrusion.  L4-L5: There is bilateral hypertrophic facet arthropathy. There is a broad-based disk bulge. There is mild-moderate left and right neuroforaminal stenosis. No central canal stenosis.  L5-S1: There is severe bilateral hypertrophic facet arthropathy. There is thickening of the ligamentum flavum and ossification of the ligamentum flavum. No central canal or neuroforaminal stenosis.                                                                     Assessment:  Patient is a 77-year-old female with  a history of type 2 diabetes, ABEL, hypertension, and lumbar spondylosis and degenerative disc disease who presents for followup of bilateral low back pain and left leg radicular pain.     1. Chronic pain disorder     2. Postlaminectomy syndrome of lumbar region     3. Lumbar radiculitis           Plan:  1.  I removed the [place] Bandages and the wound site looks well healed, Steri-Strips still in place.  I've instructed her to begin showering but not too vigorously scrub incision sites and allow the Steri-Strips to stay in place until they fall off in the next 3-5 days.  Do not soak in water for at least 3 weeks.  2.  Since she is doing well, I will have her return for followup in one month or sooner as needed.

## 2018-08-03 DIAGNOSIS — E11.9 TYPE 2 DIABETES MELLITUS WITHOUT COMPLICATION, WITHOUT LONG-TERM CURRENT USE OF INSULIN: Primary | ICD-10-CM

## 2018-08-05 RX ORDER — METFORMIN HYDROCHLORIDE 1000 MG/1
TABLET ORAL
Qty: 180 TABLET | Refills: 2 | Status: SHIPPED | OUTPATIENT
Start: 2018-08-05 | End: 2019-06-24 | Stop reason: SDUPTHER

## 2018-08-22 ENCOUNTER — OFFICE VISIT (OUTPATIENT)
Dept: SURGERY | Facility: CLINIC | Age: 78
End: 2018-08-22
Payer: MEDICARE

## 2018-08-22 VITALS
HEART RATE: 68 BPM | WEIGHT: 203.81 LBS | HEIGHT: 62 IN | SYSTOLIC BLOOD PRESSURE: 143 MMHG | DIASTOLIC BLOOD PRESSURE: 65 MMHG | TEMPERATURE: 98 F | BODY MASS INDEX: 37.51 KG/M2

## 2018-08-22 DIAGNOSIS — K43.9 VENTRAL HERNIA WITHOUT OBSTRUCTION OR GANGRENE: ICD-10-CM

## 2018-08-22 PROCEDURE — 3078F DIAST BP <80 MM HG: CPT | Mod: CPTII,S$GLB,, | Performed by: SURGERY

## 2018-08-22 PROCEDURE — 99204 OFFICE O/P NEW MOD 45 MIN: CPT | Mod: S$GLB,,, | Performed by: SURGERY

## 2018-08-22 PROCEDURE — 99999 PR PBB SHADOW E&M-EST. PATIENT-LVL III: CPT | Mod: PBBFAC,,, | Performed by: SURGERY

## 2018-08-22 PROCEDURE — 3077F SYST BP >= 140 MM HG: CPT | Mod: CPTII,S$GLB,, | Performed by: SURGERY

## 2018-08-22 NOTE — PROGRESS NOTES
Subjective:       Patient ID: Latoya Helton is a 77 y.o. female.    Chief Complaint: Consult (Ventral hernia)    HPI  Pleasant 76 yo F referred to me for evaluation of recurrent ventral hernia. Pt notes that she has had multiple ventral hernias repaired in the past.  She notes that she has had as many as 6 surgeries.  Her last hernia repair was in 1980.  She has noted a bulge and has had some abdominal pain that he feels is related to hernia recurrence.  NO n/v.  NO changes in bowel habits.  PT also suffers with DM.  Her last A1C a year ago was over 7.  She also suffers with HTN and CAD.  Denies history of smoking  Review of Systems   Constitutional: Negative for activity change, appetite change, fever and unexpected weight change.   Respiratory: Negative for chest tightness, shortness of breath and wheezing.    Cardiovascular: Negative for chest pain.   Gastrointestinal: Positive for abdominal pain. Negative for abdominal distention, anal bleeding, blood in stool, constipation, diarrhea, nausea, rectal pain and vomiting.   Genitourinary: Negative for difficulty urinating, dysuria and frequency.   Skin: Negative for color change and wound.   Neurological: Negative for dizziness.   Hematological: Negative for adenopathy.   Psychiatric/Behavioral: Negative for agitation and decreased concentration.       Objective:      Physical Exam   Constitutional: She is oriented to person, place, and time. She appears well-developed and well-nourished.   HENT:   Head: Normocephalic and atraumatic.   Right Ear: External ear normal.   Left Ear: External ear normal.   Eyes: Pupils are equal, round, and reactive to light. Right eye exhibits no discharge. Left eye exhibits no discharge. No scleral icterus.   Neck: Normal range of motion. Neck supple. No tracheal deviation present. No thyromegaly present.   Cardiovascular: Normal rate, regular rhythm and normal heart sounds.   No murmur heard.  Pulmonary/Chest: Effort normal and breath  sounds normal. She exhibits no tenderness.   Abdominal: Soft. Bowel sounds are normal. She exhibits no distension, no abdominal bruit, no pulsatile midline mass and no mass. There is no hepatosplenomegaly. There is no tenderness. There is no rigidity, no rebound, no guarding, no tenderness at McBurney's point and negative Tamayo's sign. A hernia is present. Hernia confirmed negative in the ventral area.       Genitourinary: Rectum normal.   Musculoskeletal: Normal range of motion. She exhibits no edema, tenderness or deformity.   Lymphadenopathy:     She has no cervical adenopathy.        Right: No supraclavicular adenopathy present.        Left: No supraclavicular adenopathy present.   Neurological: She is alert and oriented to person, place, and time. No cranial nerve deficit.   Skin: Skin is warm. No rash noted. No erythema. No pallor.   Psychiatric: She has a normal mood and affect. Her behavior is normal.   Vitals reviewed.        Lab Results   Component Value Date    WBC 9.10 09/28/2015    HGB 8.1 (L) 09/28/2015    HCT 27.7 (L) 09/28/2015    MCV 73 (L) 09/28/2015     (H) 09/28/2015     Lab Results   Component Value Date    HGBA1C 7.2 (H) 05/24/2017       Assessment:     Recurrent incisional hernia      Plan:       D/w pt. She does have a hernia and could consider surgical repair. Prior to surgery I would recommend weight loss as her BMI is over 35 which puts her at risks of recurrence.  Also recommended tight Blood sugar control.  Will check an A1C in near future.  Pt to get a ct scan and to f/u with me prn

## 2018-08-22 NOTE — LETTER
August 22, 2018      Scott Moe MD  [address] [facility] [address] [place] [address]           [facility]  [address] [facility] [address] [place] [address]  Phone: [phone]          Patient: Latoya Helton   MR Number: [id number]   YOB: 1940   Date of Visit: 8/22/2018       Dear Dr. Scott Moe:    Thank you for referring Latoya Helton to me for evaluation. Attached you will find relevant portions of my assessment and plan of care.    If you have questions, please do not hesitate to call me. I look forward to following Latoya Helton along with you.    Sincerely,    Chon Harden MD    Enclosure  CC:  No Recipients    If you would like to receive this communication electronically, please contact [email or web address] or [phone] to request more information on [employer] Link access.    For providers and/or their staff who would like to refer a patient to [facility], please contact us through our one-stop-shop provider referral line, [facility] , at [phone].    If you feel you have received this communication in error or would no longer like to receive these types of communications, please e-mail [email or web address]

## 2018-08-23 ENCOUNTER — TELEPHONE (OUTPATIENT)
Dept: FAMILY MEDICINE | Facility: CLINIC | Age: 78
End: 2018-08-23

## 2018-08-23 NOTE — TELEPHONE ENCOUNTER
----- Message from Karyn Rolon sent at 8/23/2018  2:24 PM CDT -----  Type: Needs Medical Advice    Who Called:  Patient  Pharmacy name and phone #:   [id number] - [place], [place] - [address] [place] [id number]  Phone: [phone] Fax: [phone]  Best Call Back Number: [phone]  Additional Information: Patient states that office was supposed to give her a 50 mg for her nortriptyline (PAMELOR) 25 MG capsule but instead only gave her 25 mg which does not help. Please call to advise.

## 2018-08-24 ENCOUNTER — TELEPHONE (OUTPATIENT)
Dept: ADMINISTRATIVE | Facility: OTHER | Age: 78
End: 2018-08-24

## 2018-08-24 NOTE — TELEPHONE ENCOUNTER
----- Message from Chari Rogers sent at 8/24/2018  4:42 PM CDT -----  Contact: Self  Call placed to POD     Patient needs to speak to the nurse about medication HYDROcodone-acetaminophen (NORCO) 7.5-325 mg per tablet    Patient states nurse was supposed to call medication in     Please call back [phone]

## 2018-08-27 RX ORDER — HYDROCODONE BITARTRATE AND ACETAMINOPHEN 7.5; 325 MG/1; MG/1
1 TABLET ORAL EVERY 6 HOURS PRN
Qty: 20 TABLET | Refills: 0 | Status: SHIPPED | OUTPATIENT
Start: 2018-08-27 | End: 2018-09-26

## 2018-08-27 NOTE — TELEPHONE ENCOUNTER
----- Message from Chucky Romero sent at 8/27/2018  4:37 PM CDT -----  Type:  Pharmacy Calling to Clarify an RX    Name of Caller:  Tin  Pharmacy Name:  [facility]  Prescription Name:  Hydrocodone  What do they need to clarify?:  Need diagnosis code  Best Call Back Number:  [phone]

## 2018-08-27 NOTE — TELEPHONE ENCOUNTER
Left voice message that request has been sent to Dr. Alarcon to please check her pharmacy later this afternoon.

## 2018-08-27 NOTE — TELEPHONE ENCOUNTER
----- Message from Kaur Zayas sent at 8/27/2018  2:01 PM CDT -----  Contact: Patient  Type:  RX Refill Request    Who Called:  Patient  Refill or New Rx:  Refill  RX Name and Strength:  HYDROcodone-acetaminophen (NORCO) 7.5-325 mg per tablet  Is this a 30 day or 90 day RX:  30 Day  Preferred Pharmacy with phone number:  [address]  Local or Mail Order:  Local  Ordering Provider:  Dr. Alarcon  [facility] Call Back Number:  [phone]  Additional Information:  Patient advised she called her pharmacy and they have not received the refill. They advised her to contact Dr. Alarcon's office.

## 2018-09-26 ENCOUNTER — TELEPHONE (OUTPATIENT)
Dept: SURGERY | Facility: CLINIC | Age: 78
End: 2018-09-26

## 2018-09-27 ENCOUNTER — TELEPHONE (OUTPATIENT)
Dept: SURGERY | Facility: CLINIC | Age: 78
End: 2018-09-27

## 2018-09-27 RX ORDER — CARVEDILOL 6.25 MG/1
TABLET ORAL
Qty: 180 TABLET | Refills: 3 | Status: SHIPPED | OUTPATIENT
Start: 2018-09-27 | End: 2019-01-21 | Stop reason: SDUPTHER

## 2018-10-22 DIAGNOSIS — M65.30 TRIGGER FINGER, UNSPECIFIED FINGER, UNSPECIFIED LATERALITY: Primary | ICD-10-CM

## 2019-01-21 NOTE — TELEPHONE ENCOUNTER
----- Message from Sanna De Paz sent at 1/21/2019 12:27 PM CST -----  Type:  RX Refill Request    Who Called:  Patient  RX Name and Strength:  carvedilol (COREG) 6.25 MG tablet;glyBURIDE (DIABETA) 5 MG tablet;losartan (COZAAR) 50 MG tablet;metFORMIN (GLUCOPHAGE) 1000 MG tablet;nortriptyline (PAMELOR) 50 MG capsule; omeprazole (PRILOSEC) 40 MG capsule and atorvastatin (LIPITOR) 20 MG tablet  Preferred Pharmacy with phone number:  [facility] Call Back Number:  [phone]  Additional Information:

## 2019-01-22 RX ORDER — ATORVASTATIN CALCIUM 20 MG/1
20 TABLET, FILM COATED ORAL DAILY
Qty: 90 TABLET | Refills: 1 | Status: SHIPPED | OUTPATIENT
Start: 2019-01-22 | End: 2019-09-16 | Stop reason: SDUPTHER

## 2019-01-22 RX ORDER — NORTRIPTYLINE HYDROCHLORIDE 25 MG/1
CAPSULE ORAL
Qty: 90 CAPSULE | Refills: 1 | Status: SHIPPED | OUTPATIENT
Start: 2019-01-22 | End: 2019-06-07 | Stop reason: SDUPTHER

## 2019-01-22 RX ORDER — GLYBURIDE 5 MG/1
5 TABLET ORAL 2 TIMES DAILY WITH MEALS
Qty: 180 TABLET | Refills: 1 | Status: SHIPPED | OUTPATIENT
Start: 2019-01-22 | End: 2019-05-30 | Stop reason: SDUPTHER

## 2019-01-22 RX ORDER — OMEPRAZOLE 40 MG/1
40 CAPSULE, DELAYED RELEASE ORAL DAILY
Qty: 90 CAPSULE | Refills: 1 | Status: SHIPPED | OUTPATIENT
Start: 2019-01-22 | End: 2019-09-16 | Stop reason: SDUPTHER

## 2019-01-22 RX ORDER — CARVEDILOL 6.25 MG/1
6.25 TABLET ORAL 2 TIMES DAILY
Qty: 180 TABLET | Refills: 1 | Status: SHIPPED | OUTPATIENT
Start: 2019-01-22 | End: 2019-05-30 | Stop reason: SDUPTHER

## 2019-02-21 ENCOUNTER — TELEPHONE (OUTPATIENT)
Dept: FAMILY MEDICINE | Facility: CLINIC | Age: 79
End: 2019-02-21

## 2019-02-21 NOTE — TELEPHONE ENCOUNTER
----- Message from Gualberto Lazaro sent at 2/20/2019  4:32 PM CST -----  Type: Needs Medical Advice    Who Called:  Patient     Best Call Back Number: [phone]  Additional Information: Caller states that she would like a callback regarding her swelling feet and fluid pills

## 2019-04-16 ENCOUNTER — TELEPHONE (OUTPATIENT)
Dept: PAIN MEDICINE | Facility: CLINIC | Age: 79
End: 2019-04-16

## 2019-04-16 NOTE — TELEPHONE ENCOUNTER
----- Message from Jennifer Higginbotham sent at 4/16/2019  3:16 PM CDT -----  Contact: patient  Type: Needs Medical Advice    Who Called:  patient  Symptoms (please be specific):    How long has patient had these symptoms:    Pharmacy name and phone #:    Best Call Back Number: [phone]  Additional Information: requesting a call back regarding having battery stimulator checked by Andre Martinez that comes to Dr. Alarcon's office

## 2019-04-18 NOTE — TELEPHONE ENCOUNTER
Spoke with the patient and she needed to meet with the rep. Sent message to the rep to call the patient.

## 2019-04-26 ENCOUNTER — TELEPHONE (OUTPATIENT)
Dept: PAIN MEDICINE | Facility: CLINIC | Age: 79
End: 2019-04-26

## 2019-04-26 NOTE — TELEPHONE ENCOUNTER
----- Message from Filomena Lopez sent at 4/26/2019  1:19 PM CDT -----  Contact: Patient  Type: Needs Medical Advice    Who Called:  Pt    Best Call Back Number: [phone] (home)   Additional Information: pt needs to speak to a nurse she is having knee problems and needs to come in asap   .

## 2019-04-29 ENCOUNTER — TELEPHONE (OUTPATIENT)
Dept: PAIN MEDICINE | Facility: CLINIC | Age: 79
End: 2019-04-29

## 2019-04-29 NOTE — TELEPHONE ENCOUNTER
----- Message from RT Apolinar sent at 4/29/2019  2:56 PM CDT -----  Contact: pt    pt , returned missed call, called enoc crowley.

## 2019-04-30 ENCOUNTER — TELEPHONE (OUTPATIENT)
Dept: PAIN MEDICINE | Facility: CLINIC | Age: 79
End: 2019-04-30

## 2019-04-30 NOTE — TELEPHONE ENCOUNTER
----- Message from Blaze Porter sent at 4/30/2019  1:52 PM CDT -----  Type:  Patient Returning Call    Who Called: self Who Left Message for Patient:  Ana  Does the patient know what this is regarding?:    Best Call Back Number:  [phone]  Additional Information:  Patient waiting for a return call from the nurse.

## 2019-04-30 NOTE — TELEPHONE ENCOUNTER
----- Message from Catrachita Paige sent at 4/30/2019 12:11 PM CDT -----  Contact: pt  ..Type:  Patient Returning Call    Who Called:  pt  Who Left Message for Patient:  Ana  Does the patient know what this is regarding?: discuss issues with right leg  Best Call Back Number:    Additional Information:  Pt returned a missed call  Please advise  Thanks

## 2019-04-30 NOTE — TELEPHONE ENCOUNTER
Spoke with patient. She stated she is having lower back and right leg pain. She stated she is also having difficulty walking on that leg also. She stated Farshad from [employer] reprogrammed her 2 weeks ago and the right leg pain went away for a short period of time and then returned the next day. Spoke with mekhi regarding this also. An appointment was made on thursday afternoon with Bert and mekhi said he would be here for the appointment.

## 2019-05-02 ENCOUNTER — OFFICE VISIT (OUTPATIENT)
Dept: PAIN MEDICINE | Facility: CLINIC | Age: 79
End: 2019-05-02
Payer: MEDICARE

## 2019-05-02 VITALS
HEART RATE: 95 BPM | RESPIRATION RATE: 18 BRPM | WEIGHT: 197.06 LBS | TEMPERATURE: 97 F | OXYGEN SATURATION: 95 % | BODY MASS INDEX: 36.05 KG/M2 | SYSTOLIC BLOOD PRESSURE: 170 MMHG | DIASTOLIC BLOOD PRESSURE: 74 MMHG

## 2019-05-02 DIAGNOSIS — M96.1 POSTLAMINECTOMY SYNDROME OF LUMBAR REGION: ICD-10-CM

## 2019-05-02 DIAGNOSIS — G89.4 CHRONIC PAIN DISORDER: Primary | ICD-10-CM

## 2019-05-02 DIAGNOSIS — M25.561 ACUTE PAIN OF RIGHT KNEE: ICD-10-CM

## 2019-05-02 DIAGNOSIS — M54.16 LUMBAR RADICULITIS: ICD-10-CM

## 2019-05-02 PROCEDURE — 3077F SYST BP >= 140 MM HG: CPT | Mod: CPTII,S$GLB,, | Performed by: PHYSICIAN ASSISTANT

## 2019-05-02 PROCEDURE — 99213 OFFICE O/P EST LOW 20 MIN: CPT | Mod: S$GLB,,, | Performed by: PHYSICIAN ASSISTANT

## 2019-05-02 PROCEDURE — 99999 PR PBB SHADOW E&M-EST. PATIENT-LVL IV: CPT | Mod: PBBFAC,,, | Performed by: PHYSICIAN ASSISTANT

## 2019-05-02 PROCEDURE — 3078F PR MOST RECENT DIASTOLIC BLOOD PRESSURE < 80 MM HG: ICD-10-PCS | Mod: CPTII,S$GLB,, | Performed by: PHYSICIAN ASSISTANT

## 2019-05-02 PROCEDURE — 3078F DIAST BP <80 MM HG: CPT | Mod: CPTII,S$GLB,, | Performed by: PHYSICIAN ASSISTANT

## 2019-05-02 PROCEDURE — 99999 PR PBB SHADOW E&M-EST. PATIENT-LVL IV: ICD-10-PCS | Mod: PBBFAC,,, | Performed by: PHYSICIAN ASSISTANT

## 2019-05-02 PROCEDURE — 1100F PR PT FALLS ASSESS DOC 2+ FALLS/FALL W/INJURY/YR: ICD-10-PCS | Mod: CPTII,S$GLB,, | Performed by: PHYSICIAN ASSISTANT

## 2019-05-02 PROCEDURE — 99213 PR OFFICE/OUTPT VISIT, EST, LEVL III, 20-29 MIN: ICD-10-PCS | Mod: S$GLB,,, | Performed by: PHYSICIAN ASSISTANT

## 2019-05-02 PROCEDURE — 1100F PTFALLS ASSESS-DOCD GE2>/YR: CPT | Mod: CPTII,S$GLB,, | Performed by: PHYSICIAN ASSISTANT

## 2019-05-02 PROCEDURE — 3077F PR MOST RECENT SYSTOLIC BLOOD PRESSURE >= 140 MM HG: ICD-10-PCS | Mod: CPTII,S$GLB,, | Performed by: PHYSICIAN ASSISTANT

## 2019-05-02 PROCEDURE — 3288F FALL RISK ASSESSMENT DOCD: CPT | Mod: CPTII,S$GLB,, | Performed by: PHYSICIAN ASSISTANT

## 2019-05-02 PROCEDURE — 3288F PR FALLS RISK ASSESSMENT DOCUMENTED: ICD-10-PCS | Mod: CPTII,S$GLB,, | Performed by: PHYSICIAN ASSISTANT

## 2019-05-03 ENCOUNTER — TELEPHONE (OUTPATIENT)
Dept: PAIN MEDICINE | Facility: CLINIC | Age: 79
End: 2019-05-03

## 2019-05-03 DIAGNOSIS — M25.561 ACUTE PAIN OF RIGHT KNEE: Primary | ICD-10-CM

## 2019-05-03 NOTE — TELEPHONE ENCOUNTER
I cannot provide pain medication and Dr. Alarcon is out of the clinic.  She has a follow-up with her orthopedic surgeon on Monday to evaluate her knee.

## 2019-05-03 NOTE — TELEPHONE ENCOUNTER
----- Message from Blaze Porter sent at 5/3/2019 11:46 AM CDT -----  Type: Needs Medical Advice    Who Called:  Self   Symptoms (please be specific):  NA   How long has patient had these symptoms:  PAT   Pharmacy name and phone #:  PAT   Best Call Back Number: [phone]   Additional Information: Patient states the right knee pain is worse. Patient states she was not prescribed pain medication, during office visit with Mr Roblero.

## 2019-05-03 NOTE — TELEPHONE ENCOUNTER
Spoke with patient regarding these recommendations. She stated she is having increased pain due to stepping incorrectly one the right knee.

## 2019-05-06 ENCOUNTER — OFFICE VISIT (OUTPATIENT)
Dept: ORTHOPEDICS | Facility: CLINIC | Age: 79
End: 2019-05-06
Payer: MEDICARE

## 2019-05-06 ENCOUNTER — HOSPITAL ENCOUNTER (OUTPATIENT)
Dept: RADIOLOGY | Facility: HOSPITAL | Age: 79
Discharge: HOME | End: 2019-05-06
Attending: ORTHOPAEDIC SURGERY
Payer: MEDICARE

## 2019-05-06 VITALS — WEIGHT: 200 LBS | BODY MASS INDEX: 36.8 KG/M2 | HEIGHT: 62 IN

## 2019-05-06 DIAGNOSIS — M25.561 CHRONIC PAIN OF RIGHT KNEE: Primary | ICD-10-CM

## 2019-05-06 DIAGNOSIS — M25.561 ACUTE PAIN OF RIGHT KNEE: ICD-10-CM

## 2019-05-06 DIAGNOSIS — M17.11 PRIMARY OSTEOARTHRITIS OF RIGHT KNEE: ICD-10-CM

## 2019-05-06 DIAGNOSIS — Z87.39 HISTORY OF BACK PAIN: ICD-10-CM

## 2019-05-06 DIAGNOSIS — G89.29 CHRONIC PAIN OF RIGHT KNEE: Primary | ICD-10-CM

## 2019-05-06 PROCEDURE — 73562 XR KNEE ORTHO BILAT: ICD-10-PCS | Mod: 26,50,, | Performed by: RADIOLOGY

## 2019-05-06 PROCEDURE — 99999 PR PBB SHADOW E&M-EST. PATIENT-LVL II: CPT | Mod: PBBFAC,,, | Performed by: ORTHOPAEDIC SURGERY

## 2019-05-06 PROCEDURE — 99204 PR OFFICE/OUTPT VISIT, NEW, LEVL IV, 45-59 MIN: ICD-10-PCS | Mod: 25,S$GLB,, | Performed by: ORTHOPAEDIC SURGERY

## 2019-05-06 PROCEDURE — 73562 X-RAY EXAM OF KNEE 3: CPT | Mod: TC,50,PO

## 2019-05-06 PROCEDURE — 99204 OFFICE O/P NEW MOD 45 MIN: CPT | Mod: 25,S$GLB,, | Performed by: ORTHOPAEDIC SURGERY

## 2019-05-06 PROCEDURE — 73562 X-RAY EXAM OF KNEE 3: CPT | Mod: 26,50,, | Performed by: RADIOLOGY

## 2019-05-06 PROCEDURE — 20610 DRAIN/INJ JOINT/BURSA W/O US: CPT | Mod: RT,S$GLB,, | Performed by: ORTHOPAEDIC SURGERY

## 2019-05-06 PROCEDURE — 1101F PT FALLS ASSESS-DOCD LE1/YR: CPT | Mod: CPTII,S$GLB,, | Performed by: ORTHOPAEDIC SURGERY

## 2019-05-06 PROCEDURE — 20610 LARGE JOINT ASPIRATION/INJECTION: R KNEE: ICD-10-PCS | Mod: RT,S$GLB,, | Performed by: ORTHOPAEDIC SURGERY

## 2019-05-06 PROCEDURE — 1101F PR PT FALLS ASSESS DOC 0-1 FALLS W/OUT INJ PAST YR: ICD-10-PCS | Mod: CPTII,S$GLB,, | Performed by: ORTHOPAEDIC SURGERY

## 2019-05-06 PROCEDURE — 99999 PR PBB SHADOW E&M-EST. PATIENT-LVL II: ICD-10-PCS | Mod: PBBFAC,,, | Performed by: ORTHOPAEDIC SURGERY

## 2019-05-06 RX ORDER — TRIAMCINOLONE ACETONIDE 40 MG/ML
40 INJECTION, SUSPENSION INTRA-ARTICULAR; INTRAMUSCULAR
Status: DISCONTINUED | OUTPATIENT
Start: 2019-05-06 | End: 2019-05-06 | Stop reason: HOSPADM

## 2019-05-06 RX ADMIN — TRIAMCINOLONE ACETONIDE 40 MG: 40 INJECTION, SUSPENSION INTRA-ARTICULAR; INTRAMUSCULAR at 02:05

## 2019-05-06 NOTE — PROGRESS NOTES
CC: Back pain    HPI: Patient is a 78-year-old female with a history of type 2 diabetes, ABEL, hypertension, and lumbar spondylosis and degenerative disc disease who presents for followup of bilateral low back pain and left leg radicular pain.  She returns in follow-up today with severe low back and right leg pain.  This has slowly been getting worse over the past few days and she is having difficulty putting weight on her right leg.  She denies relief with her spinal cord stimulator and is having trouble getting around because the pain. She denies weakness, numbness, bladder or bowel incontinence.    Pain Intervention history: She is status post left L3 transforaminal steroid injection on 4/25/13 and reports having moderate relief but only lasting about one week. Caudal JERI 9/13/10 with about 50% relief lasting one month.  She's been followed by Dr. Mendiola in the past and has undergone multiple interventions without benefit. Interventions include left-sided L4 and L5 medial branch radiofrequency ablation, left-sided L4/5 and L5/S1 facet injections, and both L2 and L3 selective nerve root steroid injections. She status post spinal cord stimulator implantation on 6/17/13. As far as medications, she has taken Elavil and meloxicam previously which did not provide benefit. She tried tramadol which seem to be effective initially but lost effectiveness. She developed rash with gabapentin. She is status post left L2 transforaminal epidural steroid injection on 4/23/14 with almost 100% relief lasting a couple weeks, now reporting a mild return in her pain.  She is unable to take NSAIDs as per her primary care physician.  She is status post IPG exchange with [employer] on 07/26/2018.     REVIEW OF SYSTEMS: Negative for fevers, chills, nausea, vomiting, diarrhea, chest pain, bowel or bladder incontinence, lower extremity weakness, positive for weight gain, cough, diabetes and back pain.     Medical, surgical, social, family  history reviewed elsewhere in document.    Allg/Med: see med card.    Vitals:    19 1358   BP: (!) 170/74   Pulse: 95   Resp: 18   Temp: 97.1 °F (36.2 °C)   TempSrc: Oral   SpO2: 95%   Weight: 89.4 kg (197 lb 1.5 oz)   PainSc:   8   PainLoc: Back         Physical Exam:  Gen: A and O x3, pleasant, well-groomed  Skin: No rashes or obvious lesions.   HEENT: PERRLA  CVS: Regular rate and rhythm, normal S1 and S2, no murmurs.  Resp: Clear to auscultation bilaterally, no wheezes or rales.  Abdomen: Soft, NT/ND, normal bowel sounds present.  Musculoskeletal:  Antalgic gait due to right leg pain.    Neuro:  Lower extremities: 5/5 strength bilaterally  Reflexes: Patellar 1 +, Achilles 1 +.  Sensory: Intact and symmetrical to light touch and pinprick in L2-S1 dermatomes bilaterally except in the left L3 distribution.    Lumbar spine:  Lumbar spine: ROM is moderately limited with flexion and extension with increased low back pain.  Akira's test causes no increased pain on either side.    Supine straight leg raise causes right leg pain.  Internal and external rotation of the hip causes no increased pain on either side.  Myofascial exam: No tenderness to palpation across lumbar paraspinous muscles.    There is a palpable mass behind the right knee.      Imagin13 MRI L-spine  T12-L1: No central canal or neuroforaminal stenosis. No disc protrusion or extrusion.  L1-L2: No central canal or neuroforaminal stenosis. No disc protrusion or extrusion.  L2-L3: There is a grade I retrolisthesis with uncovering of the intervertebral disk and a superimposed broad based disk bulge. There is mild overall central canal stenosis. There is severe left neuroforaminal stenosis with probable encroachment upon the exiting left L2 nerve as a result of the aforementioned scoliotic curvature. No right neuroforaminal stenosis.  L3-L4: No central canal or neuroforaminal stenosis. No disc protrusion or extrusion.  L4-L5: There is  bilateral hypertrophic facet arthropathy. There is a broad-based disk bulge. There is mild-moderate left and right neuroforaminal stenosis. No central canal stenosis.  L5-S1: There is severe bilateral hypertrophic facet arthropathy. There is thickening of the ligamentum flavum and ossification of the ligamentum flavum. No central canal or neuroforaminal stenosis.                                                                     Assessment:  Patient is a 78-year-old female with a history of type 2 diabetes, ABEL, hypertension, and lumbar spondylosis and degenerative disc disease who presents for followup of bilateral low back pain and left leg radicular pain.     1. Chronic pain disorder     2. Postlaminectomy syndrome of lumbar region     3. Lumbar radiculitis     4. Acute pain of right knee           Plan:  1.  The spinal cord stimulator representative was present today from [employer] and was able to get coverage of the patient's low back and right leg.  However, she is having significant right knee pain mainly behind the knee.  She does have a palpable mass there and I  we will have her follow-up with orthopedics to evaluate this.  Otherwise she seems to be getting good coverage of her nerve pain with her spinal cord stimulator.  She will contact us after she sees orthopedics with an update.    Greater than 50% of this 20 min visit was spent counseling the patient.

## 2019-05-06 NOTE — PROCEDURES
Large Joint Aspiration/Injection: R knee  Date/Time: 5/6/2019 2:12 PM  Performed by: Nima Lane MD  Authorized by: Nima Lane MD     Consent Done?:  Yes (Verbal)  Indications:  Pain  Timeout: Prior to procedure the correct patient, procedure, and site was verified      Location:  Knee  Site:  R knee  Prep: Patient was prepped and draped in usual sterile fashion    Ultrasonic Guidance for needle placement: No  Needle size:  21 G  Approach:  Anterolateral  Medications:  40 mg triamcinolone acetonide 40 mg/mL  Patient tolerance:  Patient tolerated the procedure well with no immediate complications

## 2019-05-06 NOTE — PROGRESS NOTES
Dictation #1  MRN:[id number]  [facility]:[id number]  Further History  Aching pain  Worse with activity  Relieved with rest  No other associated symptoms  No other radiation    Further Exam  Alert and oriented  Pleasant  Contralateral limb has appropriate range of motion for age and condition  Contralateral limb has appropriate strength for age and condition  Contralateral limb has appropriate stability  for age and condition  No adenopathy  Pulses are appropriate for current condition  Skin is intact        Chief Complaint    Chief Complaint   Patient presents with    Right Knee - Pain       HPI  Latoya Helton is a 78 y.o.  female who presents with       Past Medical History  Past Medical History:   Diagnosis Date    Anticoagulant long-term use     ASA 81 mg    Arthritis     degenerative joint disease right knee    Cataract     OU    CHF (congestive heart failure)     in hospital Dec 2012    Chronic back pain     extending to left leg    Colon polyp     Complication of anesthesia     difficult to get IV access    COPD (chronic obstructive pulmonary disease) 12/2012    mild, exacerbation 2012    Coronary artery disease     denies MI, but has Hx angina (post-MI syndrome)    Degenerative disc disease     low back    Diabetes mellitus     type II    Dyspnea on exertion 6/13/13    saw cardiology for increasing symptoms, she is booked for angio in July 2013    Encounter for blood transfusion     GERD (gastroesophageal reflux disease)     Hyperlipidemia     Hypertension     Lower GI bleed 2012    internal hemorrhoids    Neuropathy in diabetes     ABEL (obstructive sleep apnea)     uses CPAP    Right trigger finger 2013    long finger    RLS (restless legs syndrome)        Past Surgical History  Past Surgical History:   Procedure Laterality Date    APPENDECTOMY      BACK SURGERY      Lumbar laminectomy    CARPAL TUNNEL RELEASE      right hand    DRHOLK-HWVOZDN-[id number] N/A 7/26/2018    Performed by  "Dejan Alarcon MD at [facility] OR    CHOLECYSTECTOMY      COLONOSCOPY N/A 6/25/2014    Performed by Chase Henry MD at [facility] ENDO    CORONARY ANGIOPLASTY  1/6/2012    OM1 BMS 3.5 mm stent/ 2 stents    JERI-TRANSFORAMINAL Left 4/23/2014    Performed by Dejan [facility]con MD at [facility] OR    HAND SURGERY      Left/ repair of "broken" hand    HERNIA REPAIR      Hiatal    HYSTERECTOMY      INSERTION, SPINAL CORD STIMULATOR, DORSAL COLUMN N/A 6/17/2013    Performed by Dejan [facility]con MD at [facility] OR    INSERTION, SPINAL CORD STIMULATOR, DORSAL COLUMN, FOR TRIAL N/A 5/22/2013    Performed by Dejan [facility]con MD at [facility] OR    RELEASE, TRIGGER FINGER Right 1/22/2013    Performed by Kamlesh Galarza Jr., MD at [facility] OR    SPINAL CORD STIMULATOR TRIAL W/ LAMINOTOMY  5/22/13    TRIGGER FINGER RELEASE  Jan 2013    right        Medications  Current Outpatient Medications   Medication Sig    albuterol (PROVENTIL) 2.5 mg /3 mL (0.083 %) nebulizer solution Take 3 mLs (2.5 mg total) by nebulization every 4 (four) hours as needed for Wheezing.    alcohol swabs (BD ALCOHOL SWAB) PadM Apply 1 each topically once daily.    aspirin (ECOTRIN) 81 MG EC tablet Take 1 tablet (81 mg total) by mouth once daily.    atorvastatin (LIPITOR) 20 MG tablet Take 1 tablet (20 mg total) by mouth once daily.    beclomethasone (QVAR) 40 mcg/actuation Aero Inhale 1 puff into the lungs 2 (two) times daily. Not sure of dose     blood sugar diagnostic (BLOOD GLUCOSE TEST) Strp Microlet lancets & Contour Test strips. Test BID.    CALCIUM CARBONATE ORAL Take 1,800 mg by mouth once daily.      carvedilol (COREG) 6.25 MG tablet Take 1 tablet (6.25 mg total) by mouth 2 (two) times daily.    DOCOSAHEXANOIC ACID/EPA (FISH OIL ORAL) Take 1 capsule by mouth once daily.    ERGOCALCIFEROL, VITAMIN D2, (VITAMIN D ORAL) Take 1,000 mg by mouth once daily.      fluticasone-salmeterol 250-50 mcg/dose (ADVAIR DISKUS) 250-50 mcg/dose diskus inhaler " Inhale 1 puff into the lungs 2 (two) times daily.    furosemide (LASIX) 40 MG tablet Take 1 tablet (40 mg total) by mouth 2 (two) times daily.    glyBURIDE (DIABETA) 5 MG tablet Take 1 tablet (5 mg total) by mouth 2 (two) times daily with meals.    iron-vitamin C 100-250 mg, ICAR-C, 100-250 mg Tab Take one tablet in the morning with an acidic drink before breakfast.    lancets (TRUEPLUS LANCETS) Misc Use daily    losartan (COZAAR) 50 MG tablet Take 1 tablet (50 mg total) by mouth once daily.    metFORMIN (GLUCOPHAGE) 1000 MG tablet TAKE ONE TABLET BY MOUTH TWICE DAILY AFTER  A  MEAL    multivitamin-minerals-lutein (CENTRUM SILVER) Tab Take 1 tablet by mouth once daily.    nortriptyline (PAMELOR) 25 MG capsule TAKE ONE CAPSULE BY MOUTH ONCE DAILY IN  THE  [facility].    omeprazole (PRILOSEC) 40 MG capsule Take 1 capsule (40 mg total) by mouth once daily.    potassium chloride SA (K-DUR) 20 MEQ tablet Take 1 tablet (20 mEq total) by mouth once daily.     No current facility-administered medications for this visit.        Allergies  Review of patient's allergies indicates:   Allergen Reactions    Ciprofloxacin Other (See Comments)     Other reaction(s): tendon rupture    Gabapentin Rash       Family History  Family History   Problem Relation Age of Onset    Heart disease Mother     Diabetes Mother     Heart attack Mother     Diabetes Sister     Heart disease Sister     Amblyopia Neg Hx     Blindness Neg Hx     Cancer Neg Hx     Cataracts Neg Hx     Glaucoma Neg Hx     Hypertension Neg Hx     Macular degeneration Neg Hx     Retinal detachment Neg Hx     Strabismus Neg Hx     Stroke Neg Hx     Thyroid disease Neg Hx        Social History  Social History     Socioeconomic History    Marital status:      Spouse name: Not on file    Number of children: Not on file    Years of education: Not on file    Highest education level: Not on file   Occupational History    Not on file   Social  Needs    Financial resource strain: Not on file    Food insecurity:     Worry: Not on file     Inability: Not on file    Transportation needs:     Medical: Not on file     Non-medical: Not on file   Tobacco Use    Smoking status: Former Smoker     Packs/day: 0.25     Years: 10.00     Pack years: 2.50     Last attempt to quit: 1980     Years since quittin.0    Smokeless tobacco: Never Used    Tobacco comment: stopped smoking in the 80s   Substance and Sexual Activity    Alcohol use: No    Drug use: No    Sexual activity: Never   Lifestyle    Physical activity:     Days per week: Not on file     Minutes per session: Not on file    Stress: Not on file   Relationships    Social connections:     Talks on phone: Not on file     Gets together: Not on file     Attends [employer] service: Not on file     Active member of club or organization: Not on file     Attends meetings of clubs or organizations: Not on file     Relationship status: Not on file   Other Topics Concern    Not on file   Social History Narrative    Not on file               Review of Systems     Constitutional: Negative    HENT: Negative  Eyes: Negative  Respiratory: Negative  Cardiovascular: Negative  Musculoskeletal: HPI  Skin: Negative  Neurological: Negative  Hematological: Negative  Endocrine: Negative                 Physical Exam    There were no vitals filed for this visit.  Body mass index is 36.58 kg/m².  Physical Examination:     General appearance -  well appearing, and in no distress  Mental status - awake  Neck - supple  Chest -  symmetric air entry  Heart - normal rate   Abdomen - soft      Assessment     1. Chronic pain of right knee    2. Primary osteoarthritis of right knee    3. History of back pain          Plan

## 2019-05-07 NOTE — PROGRESS NOTES
A 78 years old, right knee pain for years, exacerbated her pain just a day ago   when she stepped down awkwardly.  Pain is 10/10 on pain scale.  Movement makes   it worse, rest makes it better.    Exam today shows tenderness at the joint line without signs of infection or   instability.  Skin is intact.  Compartments are soft.    X-rays do show arthritic changes.    ASSESSMENT:  Knee pain, knee arthritis, lumbar radicular symptoms as well.    PLAN:  Kenalog injection to the right knee.  Encouraged strengthening and weight   loss.  If she has no relief from the cortisone shot into her knee, her symptoms   may be attributable to her lumbar spine.      CATRACHO/JANNIE  dd: 05/06/2019 14:27:22 (CDT)  td: 05/07/2019 09:54:26 (CDT)  Doc ID   #[id number]  Job ID #[id number]    CC:

## 2019-05-09 ENCOUNTER — TELEPHONE (OUTPATIENT)
Dept: ORTHOPEDICS | Facility: CLINIC | Age: 79
End: 2019-05-09

## 2019-05-10 ENCOUNTER — OFFICE VISIT (OUTPATIENT)
Dept: ORTHOPEDICS | Facility: CLINIC | Age: 79
End: 2019-05-10
Payer: MEDICARE

## 2019-05-10 VITALS — BODY MASS INDEX: 36.8 KG/M2 | WEIGHT: 200 LBS | HEIGHT: 62 IN

## 2019-05-10 DIAGNOSIS — G89.29 CHRONIC PAIN OF RIGHT KNEE: Primary | ICD-10-CM

## 2019-05-10 DIAGNOSIS — M25.561 CHRONIC PAIN OF RIGHT KNEE: Primary | ICD-10-CM

## 2019-05-10 DIAGNOSIS — M17.11 PRIMARY OSTEOARTHRITIS OF RIGHT KNEE: ICD-10-CM

## 2019-05-10 DIAGNOSIS — M54.9 BACK PAIN WITH SCIATICA: ICD-10-CM

## 2019-05-10 DIAGNOSIS — M54.16 LUMBAR RADICULOPATHY: ICD-10-CM

## 2019-05-10 DIAGNOSIS — M54.30 BACK PAIN WITH SCIATICA: ICD-10-CM

## 2019-05-10 PROCEDURE — 1101F PR PT FALLS ASSESS DOC 0-1 FALLS W/OUT INJ PAST YR: ICD-10-PCS | Mod: CPTII,S$GLB,, | Performed by: ORTHOPAEDIC SURGERY

## 2019-05-10 PROCEDURE — 99213 PR OFFICE/OUTPT VISIT, EST, LEVL III, 20-29 MIN: ICD-10-PCS | Mod: S$GLB,,, | Performed by: ORTHOPAEDIC SURGERY

## 2019-05-10 PROCEDURE — 99999 PR PBB SHADOW E&M-EST. PATIENT-LVL III: ICD-10-PCS | Mod: PBBFAC,,, | Performed by: ORTHOPAEDIC SURGERY

## 2019-05-10 PROCEDURE — 99999 PR PBB SHADOW E&M-EST. PATIENT-LVL III: CPT | Mod: PBBFAC,,, | Performed by: ORTHOPAEDIC SURGERY

## 2019-05-10 PROCEDURE — 99213 OFFICE O/P EST LOW 20 MIN: CPT | Mod: S$GLB,,, | Performed by: ORTHOPAEDIC SURGERY

## 2019-05-10 PROCEDURE — 1101F PT FALLS ASSESS-DOCD LE1/YR: CPT | Mod: CPTII,S$GLB,, | Performed by: ORTHOPAEDIC SURGERY

## 2019-05-10 RX ORDER — KETOROLAC TROMETHAMINE 30 MG/ML
30 INJECTION, SOLUTION INTRAMUSCULAR; INTRAVENOUS
Status: DISCONTINUED | OUTPATIENT
Start: 2019-05-10 | End: 2020-02-04

## 2019-05-10 NOTE — PROGRESS NOTES
A 78 years old, followup of her knee pain.  We have given her injection, no   relief.  She is still in exquisite pain, rates it 11/10 on the pain scale.  She   has a history of lumbar spine problems.  There is certainly a chance that her   symptoms could be attributable to lumbar spine.  If they are indeed due to her   knee, I think the best thing for her at this point is just simple rest,   conservative care, ice, symptomatic care for her arthritis in her knee.  I think   getting her back checked is reasonable as well.  We will see her back in our   clinic as needed.      CATRACHO/JANNIE  dd: 05/10/2019 14:16:43 (CDT)  td: 05/11/2019 05:29:55 (CDT)  Doc ID   #[id number]  Job ID #[id number]    CC:      Knee brace - bioskin  We performed a custom orthotic/brace fitting, adjusting and training with the patient. The patient demonstrated understanding and proper care. This was performed for 15 minutes.

## 2019-05-21 ENCOUNTER — TELEPHONE (OUTPATIENT)
Dept: ORTHOPEDICS | Facility: CLINIC | Age: 79
End: 2019-05-21

## 2019-05-21 NOTE — TELEPHONE ENCOUNTER
Left voicemail notifying patient that it was not Dr. Callahan office that called patient. Notified patient that it was----- Message from Ish Marvin sent at 5/21/2019  4:18 PM CDT -----  Contact: Patient  Type:  Patient Returning Call    Who Called:  Patient  Who Left Message for Patient:  unknown  Does the patient know what this is regarding?:  Advise for right knee pain  Best Call Back Number:  [phone]

## 2019-05-22 ENCOUNTER — TELEPHONE (OUTPATIENT)
Dept: ORTHOPEDICS | Facility: CLINIC | Age: 79
End: 2019-05-22

## 2019-05-22 NOTE — TELEPHONE ENCOUNTER
Informed patient Dr. Callahan office didn't try to call patient yesterday, patient stated she had a missed call but no voicemail left she is unsure which department tried calling.     ----- Message from Raquel Haynes sent at 5/22/2019 10:11 AM CDT -----  Contact: self   Type:  Patient Returning Call    Who Called: self   Who Left Message for Patient: Eryn   Does the patient know what this is regarding?: n/a  Best Call Back Number:  [phone] (home)

## 2019-05-28 ENCOUNTER — TELEPHONE (OUTPATIENT)
Dept: FAMILY MEDICINE | Facility: CLINIC | Age: 79
End: 2019-05-28

## 2019-05-28 NOTE — TELEPHONE ENCOUNTER
----- Message from Nadia Guerra sent at 5/28/2019  2:55 PM CDT -----  Contact: self [phone]  Please call her regarding her appt with Dr Lane, She said she did not see him because he said he could not help her. She said she is still sitting there in pain. Please call her with advice on this.  Thank you!

## 2019-05-28 NOTE — TELEPHONE ENCOUNTER
Spoke with pt and informed her that Dr. Moe suggests returning to Dr. Alarcon. Pt verbalized understanding.

## 2019-05-30 ENCOUNTER — OFFICE VISIT (OUTPATIENT)
Dept: PRIMARY CARE CLINIC | Facility: CLINIC | Age: 79
End: 2019-05-30
Payer: MEDICARE

## 2019-05-30 VITALS
SYSTOLIC BLOOD PRESSURE: 180 MMHG | BODY MASS INDEX: 34.2 KG/M2 | HEART RATE: 94 BPM | DIASTOLIC BLOOD PRESSURE: 78 MMHG | HEIGHT: 62 IN | WEIGHT: 185.88 LBS | TEMPERATURE: 98 F | OXYGEN SATURATION: 94 %

## 2019-05-30 DIAGNOSIS — Z78.0 ASYMPTOMATIC MENOPAUSAL STATE: ICD-10-CM

## 2019-05-30 DIAGNOSIS — Z12.39 SCREENING FOR BREAST CANCER: ICD-10-CM

## 2019-05-30 DIAGNOSIS — E78.2 MIXED HYPERLIPIDEMIA: Primary | ICD-10-CM

## 2019-05-30 DIAGNOSIS — I50.9 CHRONIC CONGESTIVE HEART FAILURE, UNSPECIFIED HEART FAILURE TYPE: ICD-10-CM

## 2019-05-30 DIAGNOSIS — I10 ESSENTIAL HYPERTENSION: ICD-10-CM

## 2019-05-30 DIAGNOSIS — M17.11 PRIMARY OSTEOARTHRITIS OF RIGHT KNEE: ICD-10-CM

## 2019-05-30 DIAGNOSIS — J44.9 CHRONIC OBSTRUCTIVE PULMONARY DISEASE, UNSPECIFIED COPD TYPE: ICD-10-CM

## 2019-05-30 DIAGNOSIS — E11.65 UNCONTROLLED TYPE 2 DIABETES MELLITUS WITH HYPERGLYCEMIA: ICD-10-CM

## 2019-05-30 PROCEDURE — 3077F SYST BP >= 140 MM HG: CPT | Mod: CPTII,S$GLB,, | Performed by: NURSE PRACTITIONER

## 2019-05-30 PROCEDURE — 1100F PR PT FALLS ASSESS DOC 2+ FALLS/FALL W/INJURY/YR: ICD-10-PCS | Mod: CPTII,S$GLB,, | Performed by: NURSE PRACTITIONER

## 2019-05-30 PROCEDURE — 3078F DIAST BP <80 MM HG: CPT | Mod: CPTII,S$GLB,, | Performed by: NURSE PRACTITIONER

## 2019-05-30 PROCEDURE — 3077F PR MOST RECENT SYSTOLIC BLOOD PRESSURE >= 140 MM HG: ICD-10-PCS | Mod: CPTII,S$GLB,, | Performed by: NURSE PRACTITIONER

## 2019-05-30 PROCEDURE — 1100F PTFALLS ASSESS-DOCD GE2>/YR: CPT | Mod: CPTII,S$GLB,, | Performed by: NURSE PRACTITIONER

## 2019-05-30 PROCEDURE — 3288F PR FALLS RISK ASSESSMENT DOCUMENTED: ICD-10-PCS | Mod: CPTII,S$GLB,, | Performed by: NURSE PRACTITIONER

## 2019-05-30 PROCEDURE — 3078F PR MOST RECENT DIASTOLIC BLOOD PRESSURE < 80 MM HG: ICD-10-PCS | Mod: CPTII,S$GLB,, | Performed by: NURSE PRACTITIONER

## 2019-05-30 PROCEDURE — 99214 OFFICE O/P EST MOD 30 MIN: CPT | Mod: S$GLB,,, | Performed by: NURSE PRACTITIONER

## 2019-05-30 PROCEDURE — 99214 PR OFFICE/OUTPT VISIT, EST, LEVL IV, 30-39 MIN: ICD-10-PCS | Mod: S$GLB,,, | Performed by: NURSE PRACTITIONER

## 2019-05-30 PROCEDURE — 3288F FALL RISK ASSESSMENT DOCD: CPT | Mod: CPTII,S$GLB,, | Performed by: NURSE PRACTITIONER

## 2019-05-30 PROCEDURE — 99499 RISK ADDL DX/OHS AUDIT: ICD-10-PCS | Mod: S$GLB,,, | Performed by: NURSE PRACTITIONER

## 2019-05-30 PROCEDURE — 99499 UNLISTED E&M SERVICE: CPT | Mod: S$GLB,,, | Performed by: NURSE PRACTITIONER

## 2019-05-30 RX ORDER — GLYBURIDE 5 MG/1
5 TABLET ORAL 2 TIMES DAILY WITH MEALS
Qty: 180 TABLET | Refills: 1 | Status: SHIPPED | OUTPATIENT
Start: 2019-05-30 | End: 2019-06-06 | Stop reason: CLARIF

## 2019-05-30 RX ORDER — LOSARTAN POTASSIUM 50 MG/1
50 TABLET ORAL DAILY
Qty: 90 TABLET | Refills: 1 | Status: SHIPPED | OUTPATIENT
Start: 2019-05-30 | End: 2019-09-16 | Stop reason: SDUPTHER

## 2019-05-30 RX ORDER — CELECOXIB 100 MG/1
100 CAPSULE ORAL 2 TIMES DAILY
Qty: 60 CAPSULE | Refills: 1 | Status: SHIPPED | OUTPATIENT
Start: 2019-05-30 | End: 2019-06-07 | Stop reason: SDUPTHER

## 2019-05-30 RX ORDER — CARVEDILOL 6.25 MG/1
6.25 TABLET ORAL 2 TIMES DAILY
Qty: 180 TABLET | Refills: 1 | Status: SHIPPED | OUTPATIENT
Start: 2019-05-30 | End: 2019-09-16 | Stop reason: SDUPTHER

## 2019-05-30 NOTE — PROGRESS NOTES
"Subjective:       Patient ID: Latoya Helton is a 78 y.o. female.    Chief Complaint: Knee Pain and Leg Pain  She last saw Dr. Moe on 07/17/2018.  This is her first time seeing me in the clinic.   She is accompanied by her daughter today.   HPI   She states has been out of blood pressure medication for a month.  She states her right knee has been hurting for about a month. She state she was "shoved down face first onto concrete floor".  Vitals:    05/30/19 1419   BP: (!) 180/78   Pulse: 94   Temp: 98.3 °F (36.8 °C)     BP Readings from Last 3 Encounters:   05/30/19 (!) 180/78   05/02/19 (!) 170/74   08/22/18 (!) 143/65     Review of Systems    States h ome glucose readings in 200's and 300's  Lab Results   Component Value Date    HGBA1C 7.2 (H) 05/24/2017     CMP  Sodium   Date Value Ref Range Status   05/24/2017 137 136 - 145 mmol/L Final     Potassium   Date Value Ref Range Status   05/24/2017 3.9 3.5 - 5.1 mmol/L Final     Chloride   Date Value Ref Range Status   05/24/2017 100 95 - 110 mmol/L Final     CO2   Date Value Ref Range Status   05/24/2017 28 23 - 29 mmol/L Final     Glucose   Date Value Ref Range Status   05/24/2017 133 (H) 70 - 110 mg/dL Final     BUN, Bld   Date Value Ref Range Status   05/24/2017 21 8 - 23 mg/dL Final     Creatinine   Date Value Ref Range Status   05/24/2017 0.8 0.5 - 1.4 mg/dL Final     Calcium   Date Value Ref Range Status   05/24/2017 9.4 8.7 - 10.5 mg/dL Final     Total Protein   Date Value Ref Range Status   05/24/2017 7.4 6.0 - 8.4 g/dL Final     Albumin   Date Value Ref Range Status   05/24/2017 3.8 3.5 - 5.2 g/dL Final     Total Bilirubin   Date Value Ref Range Status   05/24/2017 0.5 0.1 - 1.0 mg/dL Final     Comment:     For infants and newborns, interpretation of results should be based  on gestational age, weight and in agreement with clinical  observations.  Premature Infant recommended reference ranges:  Up to 24 hours.............<8.0 mg/dL  Up to 48 " hours............<12.0 mg/dL  3-5 days..................<15.0 mg/dL  6-29 days.................<15.0 mg/dL       Alkaline Phosphatase   Date Value Ref Range Status   05/24/2017 67 55 - 135 U/L Final     AST   Date Value Ref Range Status   05/24/2017 18 10 - 40 U/L Final     ALT   Date Value Ref Range Status   05/24/2017 15 10 - 44 U/L Final     Anion Gap   Date Value Ref Range Status   05/24/2017 9 8 - 16 mmol/L Final     eGFR if    Date Value Ref Range Status   05/24/2017 >60.0 >60 mL/min/1.73 m^2 Final     eGFR if non    Date Value Ref Range Status   05/24/2017 >60.0 >60 mL/min/1.73 m^2 Final     Comment:     Calculation used to obtain the estimated glomerular filtration  rate (eGFR) is the CKD-EPI equation. Since race is unknown   in our information system, the eGFR values for   -American and Non--American patients are given   for each creatinine result.       Objective:      Physical Exam   Constitutional: She is oriented to person, place, and time. She appears well-developed and well-nourished. She is active and cooperative.   HENT:   Head: Normocephalic and atraumatic.   Right Ear: External ear normal.   Left Ear: External ear normal.   Nose: Nose normal.   Mouth/Throat: Oropharynx is clear and moist.   Eyes: Lids are normal.   Neck: Trachea normal, normal range of motion, full passive range of motion without pain and phonation normal. Neck supple.   Cardiovascular: Normal rate and regular rhythm.   Pulmonary/Chest: Effort normal and breath sounds normal.   Abdominal: Soft. Bowel sounds are normal.   Large and obese abdomen   Musculoskeletal: She exhibits edema.   Lymphadenopathy:        Head (right side): No submental, no submandibular, no tonsillar, no preauricular, no posterior auricular and no occipital adenopathy present.        Head (left side): No submental, no submandibular, no tonsillar, no preauricular, no posterior auricular and no occipital adenopathy  present.     She has no cervical adenopathy.   Neurological: She is alert and oriented to person, place, and time.   Skin: Skin is warm, dry and intact.   Psychiatric: She has a normal mood and affect. Her speech is normal and behavior is normal. Judgment and thought content normal. Cognition and memory are normal.   Nursing note and vitals reviewed.      Assessment & Plan:       Mixed hyperlipidemia  -     Lipid panel; Future; Expected date: 05/30/2019    Essential hypertension  -     losartan (COZAAR) 50 MG tablet; Take 1 tablet (50 mg total) by mouth once daily.  Dispense: 90 tablet; Refill: 1  -     Lipid panel; Future; Expected date: 05/30/2019  -     CBC auto differential; Future; Expected date: 05/30/2019  -     Comprehensive metabolic panel; Future; Expected date: 05/30/2019  -     Microalbumin/creatinine urine ratio; Future; Expected date: 05/30/2019  -     carvedilol (COREG) 6.25 MG tablet; Take 1 tablet (6.25 mg total) by mouth 2 (two) times daily.  Dispense: 180 tablet; Refill: 1    Chronic obstructive pulmonary disease, unspecified COPD type    Chronic congestive heart failure, unspecified heart failure type  -     Microalbumin/creatinine urine ratio; Future; Expected date: 05/30/2019  -     carvedilol (COREG) 6.25 MG tablet; Take 1 tablet (6.25 mg total) by mouth 2 (two) times daily.  Dispense: 180 tablet; Refill: 1    Uncontrolled type 2 diabetes mellitus with hyperglycemia  -     Hemoglobin A1c; Future; Expected date: 05/30/2019  -     Lipid panel; Future; Expected date: 05/30/2019  -     Comprehensive metabolic panel; Future; Expected date: 05/30/2019  -     glyBURIDE (DIABETA) 5 MG tablet; Take 1 tablet (5 mg total) by mouth 2 (two) times daily with meals.  Dispense: 180 tablet; Refill: 1    Primary osteoarthritis of right knee  -     celecoxib (CELEBREX) 100 MG capsule; Take 1 capsule (100 mg total) by mouth 2 (two) times daily. Take with food  Dispense: 60 capsule; Refill: 1    Screening for  breast cancer  -     Mammo Digital Screening Bilat; Future; Expected date: 05/30/2019    Asymptomatic menopausal state  -     DXA Bone Density Spine And Hip; Future; Expected date: 05/30/2019    Adult BMI 33.0-33.9 kg/sq m      Medication List with Changes/Refills   New Medications    CELECOXIB (CELEBREX) 100 MG CAPSULE    Take 1 capsule (100 mg total) by mouth 2 (two) times daily. Take with food   Current Medications    ALBUTEROL (PROVENTIL) 2.5 MG /3 ML (0.083 %) NEBULIZER SOLUTION    Take 3 mLs (2.5 mg total) by nebulization every 4 (four) hours as needed for Wheezing.    ALCOHOL SWABS (BD ALCOHOL SWAB) PADM    Apply 1 each topically once daily.    ASPIRIN (ECOTRIN) 81 MG EC TABLET    Take 1 tablet (81 mg total) by mouth once daily.    ATORVASTATIN (LIPITOR) 20 MG TABLET    Take 1 tablet (20 mg total) by mouth once daily.    BECLOMETHASONE (QVAR) 40 MCG/ACTUATION AERO    Inhale 1 puff into the lungs 2 (two) times daily. Not sure of dose     BLOOD SUGAR DIAGNOSTIC (BLOOD GLUCOSE TEST) STRP    Microlet lancets & Contour Test strips. Test BID.    CALCIUM CARBONATE ORAL    Take 1,800 mg by mouth once daily.      DOCOSAHEXANOIC ACID/EPA (FISH OIL ORAL)    Take 1 capsule by mouth once daily.    ERGOCALCIFEROL, VITAMIN D2, (VITAMIN D ORAL)    Take 1,000 mg by mouth once daily.      FLUTICASONE-SALMETEROL 250-50 MCG/DOSE (ADVAIR DISKUS) 250-50 MCG/DOSE DISKUS INHALER    Inhale 1 puff into the lungs 2 (two) times daily.    FUROSEMIDE (LASIX) 40 MG TABLET    Take 1 tablet (40 mg total) by mouth 2 (two) times daily.    IRON-VITAMIN C 100-250 MG, ICAR-C, 100-250 MG TAB    Take one tablet in the morning with an acidic drink before breakfast.    LANCETS (TRUEPLUS LANCETS) MISC    Use daily    METFORMIN (GLUCOPHAGE) 1000 MG TABLET    TAKE ONE TABLET BY MOUTH TWICE DAILY AFTER  A  MEAL    MULTIVITAMIN-MINERALS-LUTEIN (CENTRUM SILVER) TAB    Take 1 tablet by mouth once daily.    NORTRIPTYLINE (PAMELOR) 25 MG CAPSULE    TAKE ONE  CAPSULE BY MOUTH ONCE DAILY IN  THE  [facility].    OMEPRAZOLE (PRILOSEC) 40 MG CAPSULE    Take 1 capsule (40 mg total) by mouth once daily.    POTASSIUM CHLORIDE SA (K-DUR) 20 MEQ TABLET    Take 1 tablet (20 mEq total) by mouth once daily.   Changed and/or Refilled Medications    Modified Medication Previous Medication    CARVEDILOL (COREG) 6.25 MG TABLET carvedilol (COREG) 6.25 MG tablet       Take 1 tablet (6.25 mg total) by mouth 2 (two) times daily.    Take 1 tablet (6.25 mg total) by mouth 2 (two) times daily.    GLYBURIDE (DIABETA) 5 MG TABLET glyBURIDE (DIABETA) 5 MG tablet       Take 1 tablet (5 mg total) by mouth 2 (two) times daily with meals.    Take 1 tablet (5 mg total) by mouth 2 (two) times daily with meals.    LOSARTAN (COZAAR) 50 MG TABLET losartan (COZAAR) 50 MG tablet       Take 1 tablet (50 mg total) by mouth once daily.    Take 1 tablet (50 mg total) by mouth once daily.         Follow up in about 1 month (around 6/27/2019), or if symptoms worsen or fail to improve.

## 2019-05-30 NOTE — PATIENT INSTRUCTIONS
[facility]  Eating Heart-Healthy Foods  Eating has a big impact on your heart health. In fact, eating healthier can improve several of your heart risks at once. For instance, it helps you manage weight, cholesterol, and blood pressure. Here are ideas to help you make heart-healthy changes without giving up all the foods and flavors you love.  Getting started  · Talk with your health care provider about eating plans, such as the DASH or Mediterranean diet. You may also be referred to a dietitian.  · Change a few things at a time. Give yourself time to get used to a few eating changes before adding more.  · Work to create a tasty, healthy eating plan that you can stick to for the rest of your life.    Goals for healthy eating  Below are some tips to improve your eating habits:  · Limit saturated fats and trans fats. Saturated fats raise your levels of cholesterol, so keep these fats to a minimum. They are found in foods such as fatty meats, whole milk, cheese, and palm and coconut oils. Avoid trans fats because they lower good cholesterol as well as raise bad cholesterol. Trans fats are most often found in processed foods.  · Reduce sodium (salt) intake. Eating too much salt may increase your blood pressure. Limit your sodium intake to 2,300 milligrams (mg) per day, or less if your health care provider recommends it. Dining out less often and eating fewer processed foods are two great ways to decrease the amount of salt you consume.  · Managing calories. A calorie is a unit of energy. Your body burns calories for fuel, but if you eat more calories than your body burns, the extras are stored as fat. Your health care provider can help you create a diet plan to manage your calories. This will likely include eating healthier foods as well as exercising regularly. To help you track your progress, keep a diary to record what you eat and how often you exercise.  Choose the right foods  Aim to make these foods staples of your diet.  If you have diabetes, you may have different recommendations than what is listed here:  · Fruits and vegetable provide plenty of nutrients without a lot of calories. At meals, fill half your plate with these foods. Split the other half of your plate between whole grains and lean protein.  · Whole grains are high in fiber and rich in vitamins and nutrients. Good choices include whole-wheat bread, pasta, and brown rice.  · Lean proteins give you nutrition with less fat. Good choices include fish, skinless chicken, and beans.  · Low-fat or nonfat dairy provides nutrients without a lot of fat. Try low-fat or nonfat milk, cheese, or yogurt.  · Healthy fats can be good for you in small amounts. These are unsaturated fats, such as olive oil, nuts, and fish. Try to have at least 2 servings per week of fatty fish such as salmon, sardines, mackerel, rainbow trout, and albacore tuna. These contain omega-3 fatty acids, which are good for your heart. Flaxseed is another source of a heart-healthy fat.  More on heart healthy eating    Read food labels  Healthy eating starts at the grocery store. Be sure to pay attention to food labels on packaged foods. Look for products that are high in fiber and protein, and low in saturated fat, cholesterol, and sodium. Avoid products that contain trans fat. And pay close attention to serving size. For instance, if you plan to eat two servings, double all the numbers on the label.  Prepare food right  A key part of healthy cooking is cutting down on added fat and salt. Look on the internet for lower-fat, lower-sodium recipes. Also, try these tips:  · Remove fat from meat and skin from poultry before cooking.  · Skim fat from the surface of soups and sauces.  · Broil, boil, bake, steam, grill, and microwave food without added fats.  · Choose ingredients that spice up your food without adding calories, fat, or sodium. Try these items: horseradish, hot sauce, lemon, mustard, nonfat salad  dressings, and vinegar. For salt-free herbs and spices, try basil, cilantro, cinnamon, pepper, and rosemary.  Date Last Reviewed: 6/25/2015  © 6491-3297 [employer]. [address] [place] [address], [place] [address]. All rights reserved. This information is not intended as a substitute for professional medical care. Always follow your healthcare professional's instructions.      ve blood work fasting at [facility]

## 2019-05-30 NOTE — LETTER
May 31, 2019      [facility]  [address] [facility] [place] [address]           [facility]  [address] [address]  Phone: [phone]          Patient: Latoya Helton   MR Number: [id number]   YOB: 1940   Date of Visit: 5/30/2019       Dear [facility]:    Thank you for referring Latoya Helton to me for evaluation. Attached you will find relevant portions of my assessment and plan of care.    If you have questions, please do not hesitate to call me. I look forward to following Latoya Helton along with you.    Sincerely,    Ashlee Porter DNP, APRN    Enclosure  CC:  No Recipients    If you would like to receive this communication electronically, please contact [email or web address] or [phone] to request more information on [facility] Link access.    For providers and/or their staff who would like to refer a patient to [facility], please contact us through our one-stop-shop provider referral line, [facility], at [phone].    If you feel you have received this communication in error or would no longer like to receive these types of communications, please e-mail [email or web address]

## 2019-06-06 ENCOUNTER — TELEPHONE (OUTPATIENT)
Dept: PRIMARY CARE CLINIC | Facility: CLINIC | Age: 79
End: 2019-06-06

## 2019-06-06 RX ORDER — GLIPIZIDE 5 MG/1
5 TABLET ORAL
Qty: 60 TABLET | Refills: 5 | Status: SHIPPED | OUTPATIENT
Start: 2019-06-06 | End: 2019-08-12 | Stop reason: SDUPTHER

## 2019-06-06 NOTE — TELEPHONE ENCOUNTER
Patients Glyburide is not covered by [employer]. Tier 1 preferred alternative is Glipizide or glimeperide at 0 $. Please advise if this change would be appropriate.

## 2019-06-06 NOTE — TELEPHONE ENCOUNTER
Unable to reach via telephone. I have sent a prescription to change her glyburide to glipizide so her pharmacy would cover it. Please notify her of this change.

## 2019-06-07 DIAGNOSIS — M17.11 PRIMARY OSTEOARTHRITIS OF RIGHT KNEE: ICD-10-CM

## 2019-06-07 LAB
CHOL/HDLC RATIO: 4.9
CHOLEST SERPL-MSCNC: 180 MG/DL (ref 0–200)
HDLC SERPL-MCNC: 37 MG/DL (ref 35–70)
LDLC SERPL CALC-MCNC: 100 MG/DL (ref 0–160)
NON HDL CHOL. (LDL+VLDL): 143
TRIGL SERPL-MCNC: 216 MG/DL (ref 40–160)

## 2019-06-07 RX ORDER — POTASSIUM CHLORIDE 20 MEQ/1
20 TABLET, EXTENDED RELEASE ORAL DAILY
Qty: 90 TABLET | Refills: 3 | Status: SHIPPED | OUTPATIENT
Start: 2019-06-07 | End: 2020-02-21 | Stop reason: SDUPTHER

## 2019-06-07 RX ORDER — CELECOXIB 100 MG/1
100 CAPSULE ORAL 2 TIMES DAILY
Qty: 180 CAPSULE | Refills: 1 | Status: SHIPPED | OUTPATIENT
Start: 2019-06-07 | End: 2019-11-04 | Stop reason: SDUPTHER

## 2019-06-07 RX ORDER — NORTRIPTYLINE HYDROCHLORIDE 25 MG/1
CAPSULE ORAL
Qty: 90 CAPSULE | Refills: 1 | Status: SHIPPED | OUTPATIENT
Start: 2019-06-07 | End: 2019-09-16 | Stop reason: SDUPTHER

## 2019-06-07 NOTE — TELEPHONE ENCOUNTER
Spoke with pt to verify change in rx, pt also spoke with Jennifer Monet regarding changing from pharmacy to mail order service to reduce cost of medications, pt verbalized understanding and no further questions at this time

## 2019-06-20 ENCOUNTER — TELEPHONE (OUTPATIENT)
Dept: PRIMARY CARE CLINIC | Facility: CLINIC | Age: 79
End: 2019-06-20

## 2019-06-20 NOTE — TELEPHONE ENCOUNTER
----- Message from Luzmaria Travis sent at 6/20/2019  2:02 PM CDT -----  Contact: Patient  Type: Needs Medical Advice    Who Called:  Patient  Best Call Back Number: [phone]    Additional Information: Patient states that she received a letter in the mail to fill out for her medications in order to get them for free. States that she does not know the name of the medication and that only the nurse knows the name of the medication. Please call to advise, thank you!

## 2019-06-24 DIAGNOSIS — E11.9 TYPE 2 DIABETES MELLITUS WITHOUT COMPLICATION, WITHOUT LONG-TERM CURRENT USE OF INSULIN: ICD-10-CM

## 2019-06-25 RX ORDER — METFORMIN HYDROCHLORIDE 1000 MG/1
TABLET ORAL
Qty: 180 TABLET | Refills: 2 | Status: SHIPPED | OUTPATIENT
Start: 2019-06-25 | End: 2019-12-23 | Stop reason: SDUPTHER

## 2019-08-05 ENCOUNTER — OFFICE VISIT (OUTPATIENT)
Dept: SPINE | Facility: CLINIC | Age: 79
End: 2019-08-05
Payer: MEDICARE

## 2019-08-05 VITALS
SYSTOLIC BLOOD PRESSURE: 138 MMHG | HEIGHT: 62 IN | BODY MASS INDEX: 34.63 KG/M2 | HEART RATE: 77 BPM | DIASTOLIC BLOOD PRESSURE: 75 MMHG | WEIGHT: 188.19 LBS

## 2019-08-05 DIAGNOSIS — Z98.890 HISTORY OF LUMBAR SURGERY: ICD-10-CM

## 2019-08-05 DIAGNOSIS — G89.29 CHRONIC BILATERAL LOW BACK PAIN WITH LEFT-SIDED SCIATICA: Primary | ICD-10-CM

## 2019-08-05 DIAGNOSIS — M54.42 CHRONIC BILATERAL LOW BACK PAIN WITH LEFT-SIDED SCIATICA: Primary | ICD-10-CM

## 2019-08-05 PROCEDURE — 99999 PR PBB SHADOW E&M-EST. PATIENT-LVL V: ICD-10-PCS | Mod: PBBFAC,,, | Performed by: PHYSICIAN ASSISTANT

## 2019-08-05 PROCEDURE — 99203 PR OFFICE/OUTPT VISIT, NEW, LEVL III, 30-44 MIN: ICD-10-PCS | Mod: S$GLB,,, | Performed by: PHYSICIAN ASSISTANT

## 2019-08-05 PROCEDURE — 3075F SYST BP GE 130 - 139MM HG: CPT | Mod: CPTII,S$GLB,, | Performed by: PHYSICIAN ASSISTANT

## 2019-08-05 PROCEDURE — 1101F PT FALLS ASSESS-DOCD LE1/YR: CPT | Mod: CPTII,S$GLB,, | Performed by: PHYSICIAN ASSISTANT

## 2019-08-05 PROCEDURE — 3078F DIAST BP <80 MM HG: CPT | Mod: CPTII,S$GLB,, | Performed by: PHYSICIAN ASSISTANT

## 2019-08-05 PROCEDURE — 99203 OFFICE O/P NEW LOW 30 MIN: CPT | Mod: S$GLB,,, | Performed by: PHYSICIAN ASSISTANT

## 2019-08-05 PROCEDURE — 99999 PR PBB SHADOW E&M-EST. PATIENT-LVL V: CPT | Mod: PBBFAC,,, | Performed by: PHYSICIAN ASSISTANT

## 2019-08-05 PROCEDURE — 3078F PR MOST RECENT DIASTOLIC BLOOD PRESSURE < 80 MM HG: ICD-10-PCS | Mod: CPTII,S$GLB,, | Performed by: PHYSICIAN ASSISTANT

## 2019-08-05 PROCEDURE — 99499 RISK ADDL DX/OHS AUDIT: ICD-10-PCS | Mod: S$GLB,,, | Performed by: PHYSICIAN ASSISTANT

## 2019-08-05 PROCEDURE — 99499 UNLISTED E&M SERVICE: CPT | Mod: S$GLB,,, | Performed by: PHYSICIAN ASSISTANT

## 2019-08-05 PROCEDURE — 3075F PR MOST RECENT SYSTOLIC BLOOD PRESS GE 130-139MM HG: ICD-10-PCS | Mod: CPTII,S$GLB,, | Performed by: PHYSICIAN ASSISTANT

## 2019-08-05 PROCEDURE — 1101F PR PT FALLS ASSESS DOC 0-1 FALLS W/OUT INJ PAST YR: ICD-10-PCS | Mod: CPTII,S$GLB,, | Performed by: PHYSICIAN ASSISTANT

## 2019-08-05 NOTE — PROGRESS NOTES
[facility] New Patient    Patient ID: Latoya Helton is a 78 y.o. female.    Chief Complaint   Patient presents with    Low-back Pain       Review of Systems   Constitutional: Negative for activity change, appetite change, chills, fever and unexpected weight change.   HENT: Negative for tinnitus, trouble swallowing and voice change.    Respiratory: Negative for apnea, cough, chest tightness and shortness of breath.    Cardiovascular: Negative for chest pain and palpitations.   Gastrointestinal: Negative for constipation, diarrhea, nausea and vomiting.   Genitourinary: Negative for difficulty urinating, dysuria, frequency and urgency.   Musculoskeletal: Positive for arthralgias, back pain and myalgias. Negative for gait problem, neck pain and neck stiffness.   Skin: Negative for wound.   Neurological: Positive for numbness. Negative for dizziness, tremors, seizures, facial asymmetry, speech difficulty, weakness, light-headedness and headaches.   Psychiatric/Behavioral: Negative for confusion and decreased concentration.       Past Medical History:   Diagnosis Date    Anticoagulant long-term use     ASA 81 mg    Arthritis     degenerative joint disease right knee    Cataract     OU    CHF (congestive heart failure)     in hospital Dec 2012    Chronic back pain     extending to left leg    Colon polyp     Complication of anesthesia     difficult to get IV access    COPD (chronic obstructive pulmonary disease) 12/2012    mild, exacerbation 2012    Coronary artery disease     denies MI, but has Hx angina (post-MI syndrome)    Degenerative disc disease     low back    Diabetes mellitus     type II    Dyspnea on exertion 6/13/13    saw cardiology for increasing symptoms, she is booked for angio in July 2013    Encounter for blood transfusion     GERD (gastroesophageal reflux disease)     Hyperlipidemia     Hypertension     Lower GI bleed 2012    internal hemorrhoids    Neuropathy in diabetes     ABEL  (obstructive sleep apnea)     uses CPAP    Right trigger finger 2013    long finger    RLS (restless legs syndrome)      Social History     Socioeconomic History    Marital status:      Spouse name: Not on file    Number of children: Not on file    Years of education: Not on file    Highest education level: Not on file   Occupational History    Not on file   Social Needs    Financial resource strain: Not on file    Food insecurity:     Worry: Not on file     Inability: Not on file    Transportation needs:     Medical: Not on file     Non-medical: Not on file   Tobacco Use    Smoking status: Former Smoker     Packs/day: 0.25     Years: 10.00     Pack years: 2.50     Last attempt to quit: 1980     Years since quittin.3    Smokeless tobacco: Never Used    Tobacco comment: stopped smoking in the 80s   Substance and Sexual Activity    Alcohol use: No    Drug use: No    Sexual activity: Never   Lifestyle    Physical activity:     Days per week: Not on file     Minutes per session: Not on file    Stress: Not on file   Relationships    Social connections:     Talks on phone: Not on file     Gets together: Not on file     Attends Hoahaoism service: Not on file     Active member of club or organization: Not on file     Attends meetings of clubs or organizations: Not on file     Relationship status: Not on file   Other Topics Concern    Not on file   Social History Narrative    Not on file     Family History   Problem Relation Age of Onset    Heart disease Mother     Diabetes Mother     Heart attack Mother     Diabetes Sister     Heart disease Sister     Amblyopia Neg Hx     Blindness Neg Hx     Cancer Neg Hx     Cataracts Neg Hx     Glaucoma Neg Hx     Hypertension Neg Hx     Macular degeneration Neg Hx     Retinal detachment Neg Hx     Strabismus Neg Hx     Stroke Neg Hx     Thyroid disease Neg Hx      Review of patient's allergies indicates:   Allergen Reactions     Ciprofloxacin Other (See Comments)     Other reaction(s): tendon rupture    Gabapentin Rash       Current Outpatient Medications:     albuterol (PROVENTIL) 2.5 mg /3 mL (0.083 %) nebulizer solution, Take 3 mLs (2.5 mg total) by nebulization every 4 (four) hours as needed for Wheezing., Disp: 90 mL, Rfl: 3    alcohol swabs (BD ALCOHOL SWAB) PadM, Apply 1 each topically once daily., Disp: 100 each, Rfl: 3    aspirin (ECOTRIN) 81 MG EC tablet, Take 1 tablet (81 mg total) by mouth once daily., Disp: 90 tablet, Rfl: 3    atorvastatin (LIPITOR) 20 MG tablet, Take 1 tablet (20 mg total) by mouth once daily., Disp: 90 tablet, Rfl: 1    beclomethasone (QVAR) 40 mcg/actuation Aero, Inhale 1 puff into the lungs 2 (two) times daily. Not sure of dose , Disp: , Rfl:     blood sugar diagnostic (BLOOD GLUCOSE TEST) Strp, Microlet lancets & Contour Test strips. Test BID., Disp: 200 strip, Rfl: 3    CALCIUM CARBONATE ORAL, Take 1,800 mg by mouth once daily.  , Disp: , Rfl:     carvedilol (COREG) 6.25 MG tablet, Take 1 tablet (6.25 mg total) by mouth 2 (two) times daily., Disp: 180 tablet, Rfl: 1    celecoxib (CELEBREX) 100 MG capsule, Take 1 capsule (100 mg total) by mouth 2 (two) times daily. Take with food, Disp: 180 capsule, Rfl: 1    DOCOSAHEXANOIC ACID/EPA (FISH OIL ORAL), Take 1 capsule by mouth once daily., Disp: , Rfl:     ERGOCALCIFEROL, VITAMIN D2, (VITAMIN D ORAL), Take 1,000 mg by mouth once daily.  , Disp: , Rfl:     fluticasone-salmeterol 250-50 mcg/dose (ADVAIR DISKUS) 250-50 mcg/dose diskus inhaler, Inhale 1 puff into the lungs 2 (two) times daily., Disp: 180 each, Rfl: 3    furosemide (LASIX) 40 MG tablet, Take 1 tablet (40 mg total) by mouth 2 (two) times daily., Disp: 180 tablet, Rfl: 3    glipiZIDE (GLUCOTROL) 5 MG tablet, Take 1 tablet (5 mg total) by mouth 2 (two) times daily before meals., Disp: 60 tablet, Rfl: 5    iron-vitamin C 100-250 mg, ICAR-C, 100-250 mg Tab, Take one tablet in the  "morning with an acidic drink before breakfast., Disp: 90 tablet, Rfl: 0    lancets (TRUEPLUS LANCETS) Misc, Use daily, Disp: 100 each, Rfl: 3    losartan (COZAAR) 50 MG tablet, Take 1 tablet (50 mg total) by mouth once daily., Disp: 90 tablet, Rfl: 1    metFORMIN (GLUCOPHAGE) 1000 MG tablet, TAKE 1 TABLET BY MOUTH TWICE DAILY AFTER  A  MEAL, Disp: 180 tablet, Rfl: 2    multivitamin-minerals-lutein (CENTRUM SILVER) Tab, Take 1 tablet by mouth once daily., Disp: 90 tablet, Rfl: 3    nortriptyline (PAMELOR) 25 MG capsule, TAKE ONE CAPSULE BY MOUTH ONCE DAILY IN  THE  [facility]., Disp: 90 capsule, Rfl: 1    omeprazole (PRILOSEC) 40 MG capsule, Take 1 capsule (40 mg total) by mouth once daily., Disp: 90 capsule, Rfl: 1    potassium chloride SA (K-DUR) 20 MEQ tablet, Take 1 tablet (20 mEq total) by mouth once daily., Disp: 90 tablet, Rfl: 3    Current Facility-Administered Medications:     ketorolac injection 30 mg, 30 mg, Intramuscular, 1 time in Clinic/HOD, Nima Lane MD    Vitals:    08/05/19 1532   BP: 138/75   BP Location: Left arm   Patient Position: Sitting   BP Method: Medium (Automatic)   Pulse: 77   Weight: 85.3 kg (188 lb 2.6 oz)   Height: 5' 2" (1.575 m)       Physical Exam   Constitutional: She is oriented to person, place, and time. She appears well-developed and well-nourished.   HENT:   Head: Normocephalic and atraumatic.   Eyes: Pupils are equal, round, and reactive to light.   Neck: Normal range of motion. Neck supple.   Cardiovascular: Normal rate.   Pulmonary/Chest: Effort normal.   Musculoskeletal: Normal range of motion. She exhibits no edema.   Neurological: She is alert and oriented to person, place, and time. She has a normal Finger-Nose-Finger Test, a normal Heel to Shin Test, a normal Romberg Test and a normal Tandem Gait Test. Gait normal.   Reflex Scores:       Tricep reflexes are 1+ on the right side and 1+ on the left side.       Bicep reflexes are 1+ on the right side and 1+ " on the left side.       Brachioradialis reflexes are 1+ on the right side and 1+ on the left side.       Patellar reflexes are 1+ on the right side and 1+ on the left side.       Achilles reflexes are 1+ on the right side and 1+ on the left side.  Skin: Skin is warm, dry and intact.   Psychiatric: She has a normal mood and affect. Her speech is normal and behavior is normal. Judgment and thought content normal.   Nursing note and vitals reviewed.      Neurologic Exam     Mental Status   Oriented to person, place, and time.   Oriented to person.   Oriented to place.   Oriented to time.   Follows 3 step commands.   Attention: normal. Concentration: normal.   Speech: speech is normal   Level of consciousness: alert  Knowledge: consistent with education.   Able to name object. Able to read. Able to repeat. Able to write. Normal comprehension.     Cranial Nerves     CN II   Visual acuity: normal  Right visual field deficit: none  Left visual field deficit: none     CN III, IV, VI   Pupils are equal, round, and reactive to light.  Right pupil: Size: 3 mm. Shape: regular. Reactivity: brisk. Consensual response: intact.   Left pupil: Size: 3 mm. Shape: regular. Reactivity: brisk. Consensual response: intact.   CN III: no CN III palsy  CN VI: no CN VI palsy  Nystagmus: none   Diplopia: none  Ophthalmoparesis: none  Conjugate gaze: present    CN V   Right facial sensation deficit: none  Left facial sensation deficit: none    CN VII   Right facial weakness: none  Left facial weakness: none    CN VIII   Hearing: intact    CN IX, X   CN IX normal.   CN X normal.     CN XI   Right sternocleidomastoid strength: normal  Left sternocleidomastoid strength: normal  Right trapezius strength: normal  Left trapezius strength: normal    CN XII   Fasciculations: absent  Tongue deviation: none    Motor Exam   Muscle bulk: normal  Overall muscle tone: normal  Right arm pronator drift: absent  Left arm pronator drift: absent    Strength    Right neck flexion: 5/5  Left neck flexion: 5/5  Right neck extension: 5/5  Left neck extension: 5/5  Right deltoid: 5/5  Left deltoid: 5/5  Right biceps: 5/5  Left biceps: 5/5  Right triceps: 5/5  Left triceps: 5/5  Right wrist flexion: 5/  Left wrist flexion: 5/  Right wrist extension: /  Left wrist extension:   Right interossei: 55  Left interossei: 5  Right abdominals: 5/  Left abdominals: 5/  Right iliopsoas: 5  Left iliopsoas: 5  Right quadriceps: 5  Left quadriceps:   Right hamstrin/5  Left hamstrin/5  Right glutei:   Left glutei:   Right anterior tibial:   Left anterior tibial:   Right posterior tibial:   Left posterior tibial:   Right peroneal:   Left peroneal:   Right gastroc:   Left gastroc:     Sensory Exam   Right arm light touch: normal  Left arm light touch: normal  Right leg light touch: normal  Left leg light touch: normal  Right arm vibration: normal  Left arm vibration: normal  Right arm pinprick: normal  Left arm pinprick: normal  Sensory deficit distribution on right: non-dermatomal distribution (throughout right anterior thigh)  Sensory deficit distribution on left: non-dermatomal distribution (throughout left anterior thigh)    Gait, Coordination, and Reflexes     Gait  Gait: normal    Coordination   Romberg: negative  Finger to nose coordination: normal  Heel to shin coordination: normal  Tandem walking coordination: normal    Tremor   Resting tremor: absent  Intention tremor: absent  Action tremor: absent    Reflexes   Right brachioradialis: 1+  Left brachioradialis: 1+  Right biceps: 1+  Left biceps: 1+  Right triceps: 1+  Left triceps: 1+  Right patellar: 1+  Left patellar: 1+  Right achilles: 1+  Left achilles: 1+  Right Chong: absent  Left Chong: absent  Right ankle clonus: absent  Left ankle clonus: absent      Provider dictation:  78 year old female former smoker with CHF, HTN, ABEL, diabetes, neuropathy, history of lumbar  surgery, chronic pain, history of pain stimulator placed and knee arthritis is referred by Dr. Lane for evaluation of lower back pain and leg numbness.  She underwent surgery in the 1990's/2000s with Dr. Yanira Garcia.  She has continued to have pain since the surgery.  She had stimulator placed in 2013 by Dr. Alarcon that was removed and new stimulator placed in 7/ 2018 by Dr. Alarcon.  The 2nd stimulator did work for short period of time but pain has recurred.  She has pain that is across the lower back.  She denies any radicular leg pain, however has numbness into the left leg.  In the past she did have right leg radicular symptoms but that has resolved with stimulator.  Pain increases with activity.  She is taking Tylenol and Celebrex.  Her last trial of physical therapy was after surgery.  She has had multiple pain management procedures through Dr. Alarcon leading up to the new stimulator in 2018.  Oswestry score: 40%.  PHQ:  2.    On exam she has decreased sensation in the bilateral anterior thighs any generalized distribution.  She has 5/5 strength and 1+ muscle stretch reflexes throughout.    I reviewed a CT scan of the lumbar spine from 2015 demonstrating postoperative changes of L3-4 fusion and spacer placement along with decompression from L2-L4.  Significant multilevel degenerative changes with foraminal and central canal narrowing present.    In conclusion, this is a 78-year-old female with history of lumbar surgery and stimulator placement with continued chronic lower back pain with left leg numbness.  She has had L3-4 fusion and it is possible if there are any adjacent level disease changes at L2-3, her symptoms could be generated from those changes.  She has an Abbott stimulator placed which is MRI compatible therefore we will obtain an updated MRI of the lumbar spine as well as x-rays of the lumbar spine.  She recall seeing a surgeon in the last few years who recommended further surgery above  her current fusion.  We will have her follow up with 1 of our surgeons after imaging is complete to discuss if surgery is a current realistic option for her.    Visit Diagnosis:  There are no diagnoses linked to this encounter.    Total time spent counseling greater than fifty percent of total visit time.  Counseling included discussion regarding imaging findings, diagnosis possibilities, treatment options, risks and benefits.   The patient had many questions regarding the options and long-term effects.

## 2019-08-05 NOTE — LETTER
August 5, 2019      Nima Lane MD  [address] [facility] [address] [place] [address]           [facility]  [address] [facility] [address]  [facility] [address]  Phone: [phone]  Fax: [phone]          Patient: Latoya Helton   MR Number: [id number]   YOB: 1940   Date of Visit: 8/5/2019       Dear Dr. Nima Lane:    Thank you for referring Latoya Helton to me for evaluation. Attached you will find relevant portions of my assessment and plan of care.    If you have questions, please do not hesitate to call me. I look forward to following Latoya Helton along with you.    Sincerely,    Jennifer Kellogg PA-C    Enclosure  CC:  No Recipients    If you would like to receive this communication electronically, please contact [email or web address] or [phone] to request more information on [employer] Link access.    For providers and/or their staff who would like to refer a patient to [facility], please contact us through our one-stop-shop provider referral line, [facility], at [phone].    If you feel you have received this communication in error or would no longer like to receive these types of communications, please e-mail [email or web address]

## 2019-08-09 ENCOUNTER — TELEPHONE (OUTPATIENT)
Dept: SPINE | Facility: CLINIC | Age: 79
End: 2019-08-09

## 2019-08-09 NOTE — TELEPHONE ENCOUNTER
----- Message from RT Apolinar sent at 8/9/2019  1:02 PM CDT -----  Contact: pt    pt , returned missed call, called enoc crowley.

## 2019-08-12 ENCOUNTER — OFFICE VISIT (OUTPATIENT)
Dept: PRIMARY CARE CLINIC | Facility: CLINIC | Age: 79
End: 2019-08-12
Payer: MEDICARE

## 2019-08-12 VITALS
HEIGHT: 62 IN | SYSTOLIC BLOOD PRESSURE: 120 MMHG | BODY MASS INDEX: 35.22 KG/M2 | HEART RATE: 73 BPM | WEIGHT: 191.38 LBS | DIASTOLIC BLOOD PRESSURE: 60 MMHG | OXYGEN SATURATION: 97 % | TEMPERATURE: 98 F

## 2019-08-12 DIAGNOSIS — E87.6 HYPOKALEMIA: ICD-10-CM

## 2019-08-12 DIAGNOSIS — I10 ESSENTIAL HYPERTENSION: Primary | ICD-10-CM

## 2019-08-12 DIAGNOSIS — E11.65 UNCONTROLLED TYPE 2 DIABETES MELLITUS WITH HYPERGLYCEMIA: ICD-10-CM

## 2019-08-12 DIAGNOSIS — E78.00 PURE HYPERCHOLESTEROLEMIA: ICD-10-CM

## 2019-08-12 PROCEDURE — 99214 OFFICE O/P EST MOD 30 MIN: CPT | Mod: S$GLB,,, | Performed by: NURSE PRACTITIONER

## 2019-08-12 PROCEDURE — 3074F PR MOST RECENT SYSTOLIC BLOOD PRESSURE < 130 MM HG: ICD-10-PCS | Mod: CPTII,S$GLB,, | Performed by: NURSE PRACTITIONER

## 2019-08-12 PROCEDURE — 1101F PT FALLS ASSESS-DOCD LE1/YR: CPT | Mod: CPTII,S$GLB,, | Performed by: NURSE PRACTITIONER

## 2019-08-12 PROCEDURE — 99499 RISK ADDL DX/OHS AUDIT: ICD-10-PCS | Mod: S$GLB,,, | Performed by: NURSE PRACTITIONER

## 2019-08-12 PROCEDURE — 3074F SYST BP LT 130 MM HG: CPT | Mod: CPTII,S$GLB,, | Performed by: NURSE PRACTITIONER

## 2019-08-12 PROCEDURE — 1101F PR PT FALLS ASSESS DOC 0-1 FALLS W/OUT INJ PAST YR: ICD-10-PCS | Mod: CPTII,S$GLB,, | Performed by: NURSE PRACTITIONER

## 2019-08-12 PROCEDURE — 3078F PR MOST RECENT DIASTOLIC BLOOD PRESSURE < 80 MM HG: ICD-10-PCS | Mod: CPTII,S$GLB,, | Performed by: NURSE PRACTITIONER

## 2019-08-12 PROCEDURE — 99214 PR OFFICE/OUTPT VISIT, EST, LEVL IV, 30-39 MIN: ICD-10-PCS | Mod: S$GLB,,, | Performed by: NURSE PRACTITIONER

## 2019-08-12 PROCEDURE — 3078F DIAST BP <80 MM HG: CPT | Mod: CPTII,S$GLB,, | Performed by: NURSE PRACTITIONER

## 2019-08-12 PROCEDURE — 99499 UNLISTED E&M SERVICE: CPT | Mod: S$GLB,,, | Performed by: NURSE PRACTITIONER

## 2019-08-12 RX ORDER — GLIPIZIDE 10 MG/1
10 TABLET ORAL
Qty: 180 TABLET | Refills: 1 | Status: SHIPPED | OUTPATIENT
Start: 2019-08-12 | End: 2019-12-23 | Stop reason: SDUPTHER

## 2019-08-12 NOTE — PROGRESS NOTES
"Subjective:       Patient ID: Latoya Helton is a 78 y.o. female.    Chief Complaint: Hyperlipidemia  She was last seen in primary care by me on 05/30/2019.  She last saw Dr. Moe on 07/17/2018.  HPI   She is here to follow up with lipids, DM, and her most recent labs results from [facility] on 06/07/2019.  Vitals:    08/12/19 1358   BP: 120/60   Pulse: 73   Temp: 97.6 °F (36.4 °C)     BP Readings from Last 3 Encounters:   08/12/19 120/60   08/05/19 138/75   05/30/19 (!) 180/78     Below are her labs from [facility] on 06/07/2019:                  Review of Systems    She states her sugars at home have been running in high 200's but "now it is coming down". States yesterday was 122 before eating and 173 before eating breakfast.  Objective:      Physical Exam   Constitutional: She is oriented to person, place, and time. Vital signs are normal. She appears well-developed and well-nourished. She is active and cooperative.   HENT:   Head: Normocephalic and atraumatic.   Right Ear: Hearing, tympanic membrane, external ear and ear canal normal.   Left Ear: Hearing, tympanic membrane, external ear and ear canal normal.   Nose: Nose normal.   Mouth/Throat: Uvula is midline, oropharynx is clear and moist and mucous membranes are normal.   Eyes: Lids are normal.   Neck: Trachea normal, normal range of motion, full passive range of motion without pain and phonation normal. Neck supple.   Cardiovascular: Normal rate, regular rhythm and normal heart sounds.   Pulmonary/Chest: Effort normal and breath sounds normal.   Abdominal: Soft. Bowel sounds are normal. There is no tenderness.   Obese abdomen   Musculoskeletal: Normal range of motion.   Lymphadenopathy:        Head (right side): No submental, no submandibular, no tonsillar, no preauricular, no posterior auricular and no occipital adenopathy present.        Head (left side): No submental, no submandibular, no tonsillar, no preauricular, no posterior auricular and no occipital " adenopathy present.     She has no cervical adenopathy.   Neurological: She is alert and oriented to person, place, and time.   Skin: Skin is warm, dry and intact.   Psychiatric: She has a normal mood and affect. Her speech is normal and behavior is normal. Judgment and thought content normal. Cognition and memory are normal.   Nursing note and vitals reviewed.      Assessment & Plan:       Essential hypertension- Controlled. Cozaar, Lasix, Coreg,     Uncontrolled type 2 diabetes mellitus with hyperglycemia  -     glipiZIDE (GLUCOTROL) 10 MG tablet; Take 1 tablet (10 mg total) by mouth 2 (two) times daily before meals.  Dispense: 180 tablet; Refill: 1  -     Ambulatory referral to Endocrinology  -     Hemoglobin A1c; Future; Expected date: 09/12/2019    Hypokalemia  -     Potassium; Future; Expected date: 08/12/2019    Pure hypercholesterolemia- Stable Lipitor 20mg    BMI 35.0-35.9,adult- Heart healthy diet to include Low carbohydrate, fat    I have discussed all of her lab results from [facility] dated 06/07/2019.  I have spent 30 minutes with patient with more than 50% with coordination of care as well.  Discussed in detail with her that if her HGBA1C is consistently double digit we would need to consider insulin for her treatment. She states sugars have been up prior because of stress but feels that they have been down recently (past week) I have explained that we will re-check HGBA1c next month and then consider our action plan. As for now increasing glucotrol to 10 mg bid. She verbalized understanding.      Medication List with Changes/Refills   Current Medications    ALBUTEROL (PROVENTIL) 2.5 MG /3 ML (0.083 %) NEBULIZER SOLUTION    Take 3 mLs (2.5 mg total) by nebulization every 4 (four) hours as needed for Wheezing.    ALCOHOL SWABS (BD ALCOHOL SWAB) PADM    Apply 1 each topically once daily.    ASPIRIN (ECOTRIN) 81 MG EC TABLET    Take 1 tablet (81 mg total) by mouth once daily.    ATORVASTATIN  (LIPITOR) 20 MG TABLET    Take 1 tablet (20 mg total) by mouth once daily.    BECLOMETHASONE (QVAR) 40 MCG/ACTUATION AERO    Inhale 1 puff into the lungs 2 (two) times daily. Not sure of dose     BLOOD SUGAR DIAGNOSTIC (BLOOD GLUCOSE TEST) STRP    Microlet lancets & Contour Test strips. Test BID.    CALCIUM CARBONATE ORAL    Take 1,800 mg by mouth once daily.      CARVEDILOL (COREG) 6.25 MG TABLET    Take 1 tablet (6.25 mg total) by mouth 2 (two) times daily.    CELECOXIB (CELEBREX) 100 MG CAPSULE    Take 1 capsule (100 mg total) by mouth 2 (two) times daily. Take with food    DOCOSAHEXANOIC ACID/EPA (FISH OIL ORAL)    Take 1 capsule by mouth once daily.    ERGOCALCIFEROL, VITAMIN D2, (VITAMIN D ORAL)    Take 1,000 mg by mouth once daily.      FLUTICASONE-SALMETEROL 250-50 MCG/DOSE (ADVAIR DISKUS) 250-50 MCG/DOSE DISKUS INHALER    Inhale 1 puff into the lungs 2 (two) times daily.    FUROSEMIDE (LASIX) 40 MG TABLET    Take 1 tablet (40 mg total) by mouth 2 (two) times daily.    IRON-VITAMIN C 100-250 MG, ICAR-C, 100-250 MG TAB    Take one tablet in the morning with an acidic drink before breakfast.    LANCETS (TRUEPLUS LANCETS) MISC    Use daily    LOSARTAN (COZAAR) 50 MG TABLET    Take 1 tablet (50 mg total) by mouth once daily.    METFORMIN (GLUCOPHAGE) 1000 MG TABLET    TAKE 1 TABLET BY MOUTH TWICE DAILY AFTER  A  MEAL    MULTIVITAMIN-MINERALS-LUTEIN (CENTRUM SILVER) TAB    Take 1 tablet by mouth once daily.    NORTRIPTYLINE (PAMELOR) 25 MG CAPSULE    TAKE ONE CAPSULE BY MOUTH ONCE DAILY IN  THE  [facility].    OMEPRAZOLE (PRILOSEC) 40 MG CAPSULE    Take 1 capsule (40 mg total) by mouth once daily.    POTASSIUM CHLORIDE SA (K-DUR) 20 MEQ TABLET    Take 1 tablet (20 mEq total) by mouth once daily.   Changed and/or Refilled Medications    Modified Medication Previous Medication    GLIPIZIDE (GLUCOTROL) 10 MG TABLET glipiZIDE (GLUCOTROL) 5 MG tablet       Take 1 tablet (10 mg total) by mouth 2 (two) times daily before  meals.    Take 1 tablet (5 mg total) by mouth 2 (two) times daily before meals.     Follow up in about 1 month (around 9/12/2019), or if symptoms worsen or fail to improve.

## 2019-08-12 NOTE — PATIENT INSTRUCTIONS
Increase your glucotrol (small sugar tablet) to 10 mg daily      Understanding Carbohydrates, Fats, and Protein  Food is a source of fuel and nourishment for your body. Its also a source of pleasure. Having diabetes doesnt mean you have to eat special foods or give up desserts. Instead, your dietitian can show you how to plan meals to suit your body. To start, learn how different foods affect blood sugar.  Carbohydrates  Carbohydrates are the main source of fuel for the body. Carbohydrates raise blood sugar. Many people think carbohydrates are only found in pasta or bread. But carbohydrates are actually in many kinds of foods:  · Sugars occur naturally in foods such as fruit, milk, honey, and molasses. Sugars can also be added to many foods, from cereals and yogurt to candy and desserts. Sugars raise blood sugar.  · Starches are found in bread, cereals, pasta, and dried beans. Theyre also found in corn, peas, potatoes, yam, acorn squash, and butternut squash. Starches also raise blood sugar.   · Fiber is found in foods such as vegetables, fruits, beans, and whole grains. Unlike other carbs, fiber isnt digested or absorbed. So it doesnt raise blood sugar. In fact, fiber can help keep blood sugar from rising too fast. It also helps keep blood cholesterol at a healthy level.  Did you know?  Even though carbohydrates raise blood sugar, its best to have some in every meal. They are an important part of a healthy diet.   Fat  Fat is an energy source that can be stored until needed. Fat does not raise blood sugar. However, it can raise blood cholesterol, increasing the risk of heart disease. Fat is also high in calories, which can cause weight gain. Not all types of fat are the same.  More Healthy:  · Monounsaturated fats are mostly found in vegetable oils, such as olive, canola, and peanut oils. They are also found in avocados and some nuts. Monounsaturated fats are healthy for your heart. Thats because they lower  LDL (unhealthy) cholesterol.  · Polyunsaturated fats are mostly found in vegetable oils, such as corn, safflower, and soybean oils. They are also found in some seeds, nuts, and fish. Polyunsaturated fats lower LDL (unhealthy) cholesterol. So, choosing them instead of saturated fats is healthy for your heart. Certain unsaturated fats can help lower triglycerides.   Less Healthy:  · Saturated fats are found in animal products, such as meat, poultry, whole milk, lard, and butter. Saturated fats raise LDL cholesterol and are not healthy for your heart.  · Hydrogenated oils and trans fats are formed when vegetable oils are processed into solid fats. They are found in many processed foods. Hydrogenated oils and trans fats raise LDL cholesterol and lower HDL (healthy) cholesterol. They are not healthy for your heart.  Protein  Protein helps the body build and repair muscle and other tissue. Protein has little or no effect on blood sugar. However, many foods that contain protein also contain saturated fat. By choosing low-fat protein sources, you can get the benefits of protein without the extra fat:  · Plant protein is found in dry beans and peas, nuts, and soy products, such as tofu and soymilk. These sources tend to be cholesterol-free and low in saturated fat.  · Animal protein is found in fish, poultry, meat, cheese, milk, and eggs. These contain cholesterol and can be high in saturated fat. Aim for lean, lower-fat choices.  Date Last Reviewed: 3/1/2016  © 9252-1196 [employer]. [address] [place] [address], [place] [address]. All rights reserved. This information is not intended as a substitute for professional medical care. Always follow your healthcare professional's instructions.

## 2019-08-13 NOTE — PROGRESS NOTES
Patient, Latoya Helton (MRN #[id number]), presented with a recorded BMI of 35 kg/m^2 and a documented comorbidity(s):  - Diabetes Mellitus Type 2  - Hypertension  - Hyperlipidemia  to which the severe obesity is a contributing factor. This is consistent with the definition of severe obesity (BMI 35.0-39.9) with comorbidity (ICD-10 E66.01, Z68.35). The patient's severe obesity was monitored, evaluated, addressed and/or treated. This addendum to the medical record is made on 08/13/2019.

## 2019-08-19 ENCOUNTER — TELEPHONE (OUTPATIENT)
Dept: NEUROSURGERY | Facility: CLINIC | Age: 79
End: 2019-08-19

## 2019-08-19 NOTE — TELEPHONE ENCOUNTER
Patient was unable to have MRI done as her neurostimulator is not MRI compatible. Would you be willing to order a CT myelogram for her instead and I will try to coordinate the appointment with Dr. Mason on the same day that she can be scheduled for the CT?

## 2019-08-19 NOTE — TELEPHONE ENCOUNTER
The stimulator that she has is MRI compatible.  She had a [employer] [facility] one placed in 2013 (card in the system).  Then had it removed and replaced with an [employer] one in 2018.  We are working to get her an MRI scheduled at [facility] or an [facility] as the outpatient [facility]illion was not comfortable with it.    Dr. Alarcon's office schedules similar patients frequently with [facility] and and [facility] so I am confidnet we can get the MRI.  Just taking a little bit.  We will be in touch with the patient.

## 2019-08-20 NOTE — TELEPHONE ENCOUNTER
We have her MRI scheduled for 8/29/19.  Can you reschedule her to see Dr. Mason an a date after this appointment?    Thanks -   Jennifer

## 2019-08-21 ENCOUNTER — TELEPHONE (OUTPATIENT)
Dept: FAMILY MEDICINE | Facility: CLINIC | Age: 79
End: 2019-08-21

## 2019-08-21 NOTE — TELEPHONE ENCOUNTER
Spoke with patient via telephone and informed refill was sent on 08/12/2019 to [facility] and she states that is where she wants it. I have advised her to call them to assure delivery. She verbalized understanding.

## 2019-08-21 NOTE — TELEPHONE ENCOUNTER
I have her scheduled for a follow up when Dr. Mason comes back in September. Before I call and let her know about the appointment, I wanted to check with you to see if someone would be calling her with the MRI results prior to the visit?

## 2019-08-21 NOTE — TELEPHONE ENCOUNTER
----- Message from Nya Cote sent at 8/20/2019  4:26 PM CDT -----  Contact: Self  Type: RX Refill Request    Who Called: Latoya    Refill or New Rx:Refill (approval)    RX Name and Strength:glipiZIDE (GLUCOTROL) 10 MG tablet 180 tablet     Preferred Pharmacy with phone number:[facility] [address] [place] [address] [phone] (Phone)  [phone] (Fax)    Local or Mail Order:Local    Ordering Provider:German    Would the patient rather a call back or a response via My [employer]? Call    Best Call Back Number:[phone]    Additional Information: Pharmacy is requesting approval for listed medication.

## 2019-08-21 NOTE — TELEPHONE ENCOUNTER
My plan was to have the surgeon discuss the results with her while informing of any surgical indications at the same time.  Waiting until 9/ 23 seems like a long time when her MRI is scheduled for 8/ 29.  Would dr. Stahl have something sooner?      I always call if there is something concerning.  If she cant be seen until a month after her study, then I could call and review results with her prior to the appt.

## 2019-09-16 ENCOUNTER — OFFICE VISIT (OUTPATIENT)
Dept: PRIMARY CARE CLINIC | Facility: CLINIC | Age: 79
End: 2019-09-16
Payer: MEDICARE

## 2019-09-16 VITALS
TEMPERATURE: 98 F | DIASTOLIC BLOOD PRESSURE: 64 MMHG | WEIGHT: 193.31 LBS | HEIGHT: 62 IN | HEART RATE: 71 BPM | OXYGEN SATURATION: 94 % | SYSTOLIC BLOOD PRESSURE: 130 MMHG | BODY MASS INDEX: 35.57 KG/M2

## 2019-09-16 DIAGNOSIS — J44.9 CHRONIC OBSTRUCTIVE PULMONARY DISEASE, UNSPECIFIED COPD TYPE: ICD-10-CM
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
## 2019-09-16 DIAGNOSIS — S00.11XA BLACK EYE, RIGHT, INITIAL ENCOUNTER: ICD-10-CM

## 2019-09-16 DIAGNOSIS — G25.81 RESTLESS LEG SYNDROME: ICD-10-CM
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
## 2019-09-16 DIAGNOSIS — K21.9 GERD WITHOUT ESOPHAGITIS: ICD-10-CM

## 2019-09-16 DIAGNOSIS — I50.9 CHRONIC CONGESTIVE HEART FAILURE, UNSPECIFIED HEART FAILURE TYPE: ICD-10-CM

## 2019-09-16 DIAGNOSIS — I10 ESSENTIAL HYPERTENSION: ICD-10-CM

## 2019-09-16 PROCEDURE — 3075F PR MOST RECENT SYSTOLIC BLOOD PRESS GE 130-139MM HG: ICD-10-PCS | Mod: CPTII,S$GLB,, | Performed by: NURSE PRACTITIONER

## 2019-09-16 PROCEDURE — 3288F PR FALLS RISK ASSESSMENT DOCUMENTED: ICD-10-PCS | Mod: CPTII,S$GLB,, | Performed by: NURSE PRACTITIONER

## 2019-09-16 PROCEDURE — 3078F PR MOST RECENT DIASTOLIC BLOOD PRESSURE < 80 MM HG: ICD-10-PCS | Mod: CPTII,S$GLB,, | Performed by: NURSE PRACTITIONER

## 2019-09-16 PROCEDURE — 3288F FALL RISK ASSESSMENT DOCD: CPT | Mod: CPTII,S$GLB,, | Performed by: NURSE PRACTITIONER

## 2019-09-16 PROCEDURE — 99214 PR OFFICE/OUTPT VISIT, EST, LEVL IV, 30-39 MIN: ICD-10-PCS | Mod: S$GLB,,, | Performed by: NURSE PRACTITIONER

## 2019-09-16 PROCEDURE — 1100F PTFALLS ASSESS-DOCD GE2>/YR: CPT | Mod: CPTII,S$GLB,, | Performed by: NURSE PRACTITIONER

## 2019-09-16 PROCEDURE — 3075F SYST BP GE 130 - 139MM HG: CPT | Mod: CPTII,S$GLB,, | Performed by: NURSE PRACTITIONER

## 2019-09-16 PROCEDURE — 99214 OFFICE O/P EST MOD 30 MIN: CPT | Mod: S$GLB,,, | Performed by: NURSE PRACTITIONER

## 2019-09-16 PROCEDURE — 1100F PR PT FALLS ASSESS DOC 2+ FALLS/FALL W/INJURY/YR: ICD-10-PCS | Mod: CPTII,S$GLB,, | Performed by: NURSE PRACTITIONER

## 2019-09-16 PROCEDURE — 3078F DIAST BP <80 MM HG: CPT | Mod: CPTII,S$GLB,, | Performed by: NURSE PRACTITIONER

## 2019-09-16 RX ORDER — OMEPRAZOLE 40 MG/1
40 CAPSULE, DELAYED RELEASE ORAL DAILY
Qty: 90 CAPSULE | Refills: 1 | Status: SHIPPED | OUTPATIENT
Start: 2019-09-16 | End: 2020-02-21 | Stop reason: SDUPTHER

## 2019-09-16 RX ORDER — CARVEDILOL 6.25 MG/1
6.25 TABLET ORAL 2 TIMES DAILY
Qty: 180 TABLET | Refills: 1 | Status: SHIPPED | OUTPATIENT
Start: 2019-09-16 | End: 2020-02-21 | Stop reason: SDUPTHER

## 2019-09-16 RX ORDER — LOSARTAN POTASSIUM 50 MG/1
50 TABLET ORAL DAILY
Qty: 90 TABLET | Refills: 1 | Status: SHIPPED | OUTPATIENT
Start: 2019-09-16 | End: 2019-12-23 | Stop reason: SDUPTHER

## 2019-09-16 RX ORDER — ATORVASTATIN CALCIUM 20 MG/1
20 TABLET, FILM COATED ORAL DAILY
Qty: 90 TABLET | Refills: 1 | Status: SHIPPED | OUTPATIENT
Start: 2019-09-16 | End: 2019-12-23 | Stop reason: SDUPTHER

## 2019-09-16 RX ORDER — NORTRIPTYLINE HYDROCHLORIDE 25 MG/1
CAPSULE ORAL
Qty: 90 CAPSULE | Refills: 1 | Status: SHIPPED | OUTPATIENT
Start: 2019-09-16 | End: 2020-02-21 | Stop reason: SDUPTHER

## 2019-09-16 NOTE — PROGRESS NOTES
Subjective:       Patient ID: Latoya Helton is a 78 y.o. female.    Chief Complaint: Hypertension  She was last seen in primary care by me on 08/12/2019.  She last saw Abhi on 07/17/2018.  HPI   She is here to follow up with HTN and DM. She had a fall in her home last night in the bathroom. States she was watched by her sister and nephew whom she lives with. She denies zayra headache loss of consciousness, nausea or vomiting.   Vitals:    09/16/19 1441   BP: 130/64   Pulse: 71   Temp: 98.1 °F (36.7 °C)     BP Readings from Last 3 Encounters:   09/16/19 130/64   08/12/19 120/60   08/05/19 138/75     Review of Systems   Musculoskeletal:        Fell on last night       Objective:      Physical Exam   Constitutional: She is oriented to person, place, and time. Vital signs are normal. She appears well-developed and well-nourished. She is active and cooperative.   HENT:   Head: Normocephalic and atraumatic.   Right Ear: Hearing, tympanic membrane, external ear and ear canal normal.   Left Ear: Hearing, tympanic membrane, external ear and ear canal normal.   Nose: Nose normal.   Mouth/Throat: Uvula is midline, oropharynx is clear and moist and mucous membranes are normal.   Eyes: Pupils are equal, round, and reactive to light. Conjunctivae are normal. Right eye exhibits no discharge and no exudate. No foreign body present in the right eye.       See photo of right eye   Neck: Trachea normal, normal range of motion, full passive range of motion without pain and phonation normal. Neck supple.   Cardiovascular: Normal rate, regular rhythm and normal heart sounds.   Pulmonary/Chest: Effort normal and breath sounds normal.   Abdominal: Soft. Bowel sounds are normal.   Musculoskeletal: Normal range of motion.   Lymphadenopathy:        Head (right side): No submental, no submandibular, no tonsillar, no preauricular, no posterior auricular and no occipital adenopathy present.        Head (left side): No submental, no  submandibular, no tonsillar, no preauricular, no posterior auricular and no occipital adenopathy present.     She has no cervical adenopathy.   Neurological: She is alert and oriented to person, place, and time.   Skin: Skin is warm, dry and intact.   Psychiatric: She has a normal mood and affect. Her speech is normal and behavior is normal. Judgment and thought content normal. Cognition and memory are normal.   Nursing note and vitals reviewed.            Assessment & Plan:       Uncontrolled type 2 diabetes mellitus with diabetic polyneuropathy, without long-term current use of insulin  -     atorvastatin (LIPITOR) 20 MG tablet; Take 1 tablet (20 mg total) by mouth once daily.  Dispense: 90 tablet; Refill: 1    Fall in home, initial encounter    Black eye, right, initial encounter    Essential hypertension  -     losartan (COZAAR) 50 MG tablet; Take 1 tablet (50 mg total) by mouth once daily.  Dispense: 90 tablet; Refill: 1  -     carvedilol (COREG) 6.25 MG tablet; Take 1 tablet (6.25 mg total) by mouth 2 (two) times daily.  Dispense: 180 tablet; Refill: 1    Chronic congestive heart failure, unspecified heart failure type  -     carvedilol (COREG) 6.25 MG tablet; Take 1 tablet (6.25 mg total) by mouth 2 (two) times daily.  Dispense: 180 tablet; Refill: 1    Chronic obstructive pulmonary disease, unspecified COPD type    GERD without esophagitis  -     omeprazole (PRILOSEC) 40 MG capsule; Take 1 capsule (40 mg total) by mouth once daily.  Dispense: 90 capsule; Refill: 1    Restless leg syndrome  -     nortriptyline (PAMELOR) 25 MG capsule; TAKE ONE CAPSULE BY MOUTH ONCE DAILY IN  THE  [facility].  Dispense: 90 capsule; Refill: 1    I have discussed high risk medication (Pamelor and need to be extra safe with taking it). Take it when getting directly into bed. Discussed high risk for fall.  I have also discussed the importance of dry floors and to avoid any clothing items or rugs on the floor areas near bathroom.  "Does not use assistive device at this time and states this was just a "slip".   To ED for headache, nausea, vomiting or visual changes or changes in thought process.   Medication List with Changes/Refills   Current Medications    ALBUTEROL (PROVENTIL) 2.5 MG /3 ML (0.083 %) NEBULIZER SOLUTION    Take 3 mLs (2.5 mg total) by nebulization every 4 (four) hours as needed for Wheezing.    ALCOHOL SWABS (BD ALCOHOL SWAB) PADM    Apply 1 each topically once daily.    ASPIRIN (ECOTRIN) 81 MG EC TABLET    Take 1 tablet (81 mg total) by mouth once daily.    BLOOD SUGAR DIAGNOSTIC (BLOOD GLUCOSE TEST) STRP    Microlet lancets & Contour Test strips. Test BID.    CALCIUM CARBONATE ORAL    Take 1,800 mg by mouth once daily.      CELECOXIB (CELEBREX) 100 MG CAPSULE    Take 1 capsule (100 mg total) by mouth 2 (two) times daily. Take with food    DOCOSAHEXANOIC ACID/EPA (FISH OIL ORAL)    Take 1 capsule by mouth once daily.    ERGOCALCIFEROL, VITAMIN D2, (VITAMIN D ORAL)    Take 1,000 mg by mouth once daily.      FLUTICASONE-SALMETEROL 250-50 MCG/DOSE (ADVAIR DISKUS) 250-50 MCG/DOSE DISKUS INHALER    Inhale 1 puff into the lungs 2 (two) times daily.    GLIPIZIDE (GLUCOTROL) 10 MG TABLET    Take 1 tablet (10 mg total) by mouth 2 (two) times daily before meals.    IRON-VITAMIN C 100-250 MG, ICAR-C, 100-250 MG TAB    Take one tablet in the morning with an acidic drink before breakfast.    LANCETS (TRUEPLUS LANCETS) MISC    Use daily    METFORMIN (GLUCOPHAGE) 1000 MG TABLET    TAKE 1 TABLET BY MOUTH TWICE DAILY AFTER  A  MEAL    MULTIVITAMIN-MINERALS-LUTEIN (CENTRUM SILVER) TAB    Take 1 tablet by mouth once daily.    POTASSIUM CHLORIDE SA (K-DUR) 20 MEQ TABLET    Take 1 tablet (20 mEq total) by mouth once daily.   Changed and/or Refilled Medications    Modified Medication Previous Medication    ATORVASTATIN (LIPITOR) 20 MG TABLET atorvastatin (LIPITOR) 20 MG tablet       Take 1 tablet (20 mg total) by mouth once daily.    Take 1 tablet " (20 mg total) by mouth once daily.    CARVEDILOL (COREG) 6.25 MG TABLET carvedilol (COREG) 6.25 MG tablet       Take 1 tablet (6.25 mg total) by mouth 2 (two) times daily.    Take 1 tablet (6.25 mg total) by mouth 2 (two) times daily.    LOSARTAN (COZAAR) 50 MG TABLET losartan (COZAAR) 50 MG tablet       Take 1 tablet (50 mg total) by mouth once daily.    Take 1 tablet (50 mg total) by mouth once daily.    NORTRIPTYLINE (PAMELOR) 25 MG CAPSULE nortriptyline (PAMELOR) 25 MG capsule       TAKE ONE CAPSULE BY MOUTH ONCE DAILY IN  THE  [facility].    TAKE ONE CAPSULE BY MOUTH ONCE DAILY IN  THE  [facility].    OMEPRAZOLE (PRILOSEC) 40 MG CAPSULE omeprazole (PRILOSEC) 40 MG capsule       Take 1 capsule (40 mg total) by mouth once daily.    Take 1 capsule (40 mg total) by mouth once daily.   Discontinued Medications    BECLOMETHASONE (QVAR) 40 MCG/ACTUATION AERO    Inhale 1 puff into the lungs 2 (two) times daily. Not sure of dose     FUROSEMIDE (LASIX) 40 MG TABLET    Take 1 tablet (40 mg total) by mouth 2 (two) times daily.         No follow-ups on file.

## 2019-10-03 ENCOUNTER — TELEPHONE (OUTPATIENT)
Dept: FAMILY MEDICINE | Facility: CLINIC | Age: 79
End: 2019-10-03

## 2019-10-03 LAB — HBA1C MFR BLD: 9.3 % (ref 4–6)

## 2019-10-03 NOTE — TELEPHONE ENCOUNTER
----- Message from Hernesto Castillo sent at 10/3/2019  3:08 PM CDT -----  Contact: [facility] in [place]-Registration--Darren  Patient presented to hospital to have lab work done but forgot orders at home & didn't know what needed to be done.    Darren asked orders be faxed to [phone]

## 2019-10-18 ENCOUNTER — TELEPHONE (OUTPATIENT)
Dept: SPINE | Facility: CLINIC | Age: 79
End: 2019-10-18

## 2019-10-18 DIAGNOSIS — M54.42 CHRONIC BILATERAL LOW BACK PAIN WITH LEFT-SIDED SCIATICA: Primary | ICD-10-CM

## 2019-10-18 DIAGNOSIS — G89.29 CHRONIC BILATERAL LOW BACK PAIN WITH LEFT-SIDED SCIATICA: Primary | ICD-10-CM

## 2019-10-18 DIAGNOSIS — Z98.890 HISTORY OF LUMBAR SURGERY: ICD-10-CM

## 2019-10-18 NOTE — TELEPHONE ENCOUNTER
----- Message from Jennifer Kellogg PA-C sent at 10/18/2019  1:45 PM CDT -----  Her xrays show degenerative and post op chagnes.  She was to have an MRI and follow up with Dr. Reynaga.  Looks like there was difficulty with the MRI and she cancelled her appt with Dr. Mason.  Does she want to reschedule these appointments?

## 2019-10-18 NOTE — TELEPHONE ENCOUNTER
Sam spoke with the patient and she said the technician had to stop the MRI because the stimulator in her back was getting hot, and that is why she canceled her appointment with Dr. Mason. Please advise.

## 2019-10-21 ENCOUNTER — TELEPHONE (OUTPATIENT)
Dept: SPINE | Facility: CLINIC | Age: 79
End: 2019-10-21

## 2019-10-21 NOTE — TELEPHONE ENCOUNTER
----- Message from Vikas Haney sent at 10/21/2019  4:03 PM CDT -----  Contact: self   Placed call to pod, patient miss call from your office please call back at [phone] (vugr)

## 2019-10-21 NOTE — TELEPHONE ENCOUNTER
Spoke with patient and she has been scheduled for a CT Scan tomorrow and an appointment with Dr. Mason for 11-5-19@ 2:30pm, she indicated understanding and the appointment was mailed.

## 2019-10-22 ENCOUNTER — HOSPITAL ENCOUNTER (OUTPATIENT)
Dept: RADIOLOGY | Facility: HOSPITAL | Age: 79
Discharge: HOME | End: 2019-10-22
Attending: PHYSICIAN ASSISTANT
Payer: MEDICARE

## 2019-10-22 DIAGNOSIS — G89.29 CHRONIC BILATERAL LOW BACK PAIN WITH LEFT-SIDED SCIATICA: ICD-10-CM

## 2019-10-22 DIAGNOSIS — M54.42 CHRONIC BILATERAL LOW BACK PAIN WITH LEFT-SIDED SCIATICA: ICD-10-CM

## 2019-10-22 DIAGNOSIS — Z98.890 HISTORY OF LUMBAR SURGERY: ICD-10-CM

## 2019-10-22 PROCEDURE — 72131 CT LUMBAR SPINE WITHOUT CONTRAST: ICD-10-PCS | Mod: 26,,, | Performed by: RADIOLOGY

## 2019-10-22 PROCEDURE — 72131 CT LUMBAR SPINE W/O DYE: CPT | Mod: 26,,, | Performed by: RADIOLOGY

## 2019-10-22 PROCEDURE — 72131 CT LUMBAR SPINE W/O DYE: CPT | Mod: TC,PO

## 2019-11-04 DIAGNOSIS — M17.11 PRIMARY OSTEOARTHRITIS OF RIGHT KNEE: ICD-10-CM

## 2019-11-05 ENCOUNTER — OFFICE VISIT (OUTPATIENT)
Dept: NEUROSURGERY | Facility: CLINIC | Age: 79
End: 2019-11-05
Payer: MEDICARE

## 2019-11-05 VITALS
HEIGHT: 62 IN | BODY MASS INDEX: 35.33 KG/M2 | DIASTOLIC BLOOD PRESSURE: 68 MMHG | WEIGHT: 192 LBS | HEART RATE: 75 BPM | SYSTOLIC BLOOD PRESSURE: 146 MMHG

## 2019-11-05 DIAGNOSIS — M47.816 LUMBAR SPONDYLOSIS: Primary | ICD-10-CM

## 2019-11-05 PROCEDURE — 1100F PR PT FALLS ASSESS DOC 2+ FALLS/FALL W/INJURY/YR: ICD-10-PCS | Mod: CPTII,S$GLB,, | Performed by: NEUROLOGICAL SURGERY

## 2019-11-05 PROCEDURE — 3078F DIAST BP <80 MM HG: CPT | Mod: CPTII,S$GLB,, | Performed by: NEUROLOGICAL SURGERY

## 2019-11-05 PROCEDURE — 3288F FALL RISK ASSESSMENT DOCD: CPT | Mod: CPTII,S$GLB,, | Performed by: NEUROLOGICAL SURGERY

## 2019-11-05 PROCEDURE — 3288F PR FALLS RISK ASSESSMENT DOCUMENTED: ICD-10-PCS | Mod: CPTII,S$GLB,, | Performed by: NEUROLOGICAL SURGERY

## 2019-11-05 PROCEDURE — 99214 OFFICE O/P EST MOD 30 MIN: CPT | Mod: S$GLB,,, | Performed by: NEUROLOGICAL SURGERY

## 2019-11-05 PROCEDURE — 3077F SYST BP >= 140 MM HG: CPT | Mod: CPTII,S$GLB,, | Performed by: NEUROLOGICAL SURGERY

## 2019-11-05 PROCEDURE — 1100F PTFALLS ASSESS-DOCD GE2>/YR: CPT | Mod: CPTII,S$GLB,, | Performed by: NEUROLOGICAL SURGERY

## 2019-11-05 PROCEDURE — 99499 RISK ADDL DX/OHS AUDIT: ICD-10-PCS | Mod: S$GLB,,, | Performed by: NEUROLOGICAL SURGERY

## 2019-11-05 PROCEDURE — 99999 PR PBB SHADOW E&M-EST. PATIENT-LVL IV: ICD-10-PCS | Mod: PBBFAC,,, | Performed by: NEUROLOGICAL SURGERY

## 2019-11-05 PROCEDURE — 99499 UNLISTED E&M SERVICE: CPT | Mod: S$GLB,,, | Performed by: NEUROLOGICAL SURGERY

## 2019-11-05 PROCEDURE — 99214 PR OFFICE/OUTPT VISIT, EST, LEVL IV, 30-39 MIN: ICD-10-PCS | Mod: S$GLB,,, | Performed by: NEUROLOGICAL SURGERY

## 2019-11-05 PROCEDURE — 3078F PR MOST RECENT DIASTOLIC BLOOD PRESSURE < 80 MM HG: ICD-10-PCS | Mod: CPTII,S$GLB,, | Performed by: NEUROLOGICAL SURGERY

## 2019-11-05 PROCEDURE — 3077F PR MOST RECENT SYSTOLIC BLOOD PRESSURE >= 140 MM HG: ICD-10-PCS | Mod: CPTII,S$GLB,, | Performed by: NEUROLOGICAL SURGERY

## 2019-11-05 PROCEDURE — 99999 PR PBB SHADOW E&M-EST. PATIENT-LVL IV: CPT | Mod: PBBFAC,,, | Performed by: NEUROLOGICAL SURGERY

## 2019-11-05 NOTE — PROGRESS NOTES
Neurosurgery History and Physical    Patient ID: Latoya Helton is a 78 y.o. female.    Chief Complaint   Patient presents with    Lumbar Spine Pain (L-Spine)     LBP with progressive worsening over the last few months. Patient states that she has numbness to thighs. Pain is aggrevated by walking, sitting and lifting. Pain is slightly alleviated by Tylenol. Denies bladder or bowel dysfunction. Denies any history of JERI or PT. Oswestery=34 PHQ=0       Review of Systems   Constitutional: Negative.    HENT: Negative.    Eyes: Negative.    Respiratory: Negative.    Cardiovascular: Negative.    Gastrointestinal: Negative.    Endocrine: Negative.    Genitourinary: Negative.    Musculoskeletal: Positive for back pain and gait problem.   Skin: Negative.    Allergic/Immunologic: Negative.    Neurological: Positive for numbness.   Hematological: Negative.    Psychiatric/Behavioral: Negative.        Past Medical History:   Diagnosis Date    Anticoagulant long-term use     ASA 81 mg    Arthritis     degenerative joint disease right knee    Cataract     OU    CHF (congestive heart failure)     in hospital Dec 2012    Chronic back pain     extending to left leg    Colon polyp     Complication of anesthesia     difficult to get IV access    COPD (chronic obstructive pulmonary disease) 12/2012    mild, exacerbation 2012    Coronary artery disease     denies MI, but has Hx angina (post-MI syndrome)    Degenerative disc disease     low back    Diabetes mellitus     type II    Dyspnea on exertion 6/13/13    saw cardiology for increasing symptoms, she is booked for angio in July 2013    Encounter for blood transfusion     GERD (gastroesophageal reflux disease)     Hyperlipidemia     Hypertension     Lower GI bleed 2012    internal hemorrhoids    Neuropathy in diabetes     ABEL (obstructive sleep apnea)     uses CPAP    Right trigger finger 2013    long finger    RLS (restless legs syndrome)      Social History      Socioeconomic History    Marital status:      Spouse name: Not on file    Number of children: Not on file    Years of education: Not on file    Highest education level: Not on file   Occupational History    Not on file   Social Needs    Financial resource strain: Not on file    Food insecurity:     Worry: Not on file     Inability: Not on file    Transportation needs:     Medical: Not on file     Non-medical: Not on file   Tobacco Use    Smoking status: Former Smoker     Packs/day: 0.25     Years: 10.00     Pack years: 2.50     Last attempt to quit: 1980     Years since quittin.5    Smokeless tobacco: Never Used    Tobacco comment: stopped smoking in the 80s   Substance and Sexual Activity    Alcohol use: No    Drug use: No    Sexual activity: Never   Lifestyle    Physical activity:     Days per week: Not on file     Minutes per session: Not on file    Stress: Not on file   Relationships    Social connections:     Talks on phone: Not on file     Gets together: Not on file     Attends [employer] service: Not on file     Active member of club or organization: Not on file     Attends meetings of clubs or organizations: Not on file     Relationship status: Not on file   Other Topics Concern    Not on file   Social History Narrative    Not on file     Family History   Problem Relation Age of Onset    Heart disease Mother     Diabetes Mother     Heart attack Mother     Diabetes Sister     Heart disease Sister     Amblyopia Neg Hx     Blindness Neg Hx     Cancer Neg Hx     Cataracts Neg Hx     Glaucoma Neg Hx     Hypertension Neg Hx     Macular degeneration Neg Hx     Retinal detachment Neg Hx     Strabismus Neg Hx     Stroke Neg Hx     Thyroid disease Neg Hx      Review of patient's allergies indicates:   Allergen Reactions    Ciprofloxacin Other (See Comments)     Other reaction(s): tendon rupture    Tylenol [acetaminophen] Blisters    Gabapentin Rash        Current Outpatient Medications:     albuterol (PROVENTIL) 2.5 mg /3 mL (0.083 %) nebulizer solution, Take 3 mLs (2.5 mg total) by nebulization every 4 (four) hours as needed for Wheezing., Disp: 90 mL, Rfl: 3    alcohol swabs (BD ALCOHOL SWAB) PadM, Apply 1 each topically once daily., Disp: 100 each, Rfl: 3    aspirin (ECOTRIN) 81 MG EC tablet, Take 1 tablet (81 mg total) by mouth once daily., Disp: 90 tablet, Rfl: 3    atorvastatin (LIPITOR) 20 MG tablet, Take 1 tablet (20 mg total) by mouth once daily., Disp: 90 tablet, Rfl: 1    blood sugar diagnostic (BLOOD GLUCOSE TEST) Strp, Microlet lancets & Contour Test strips. Test BID., Disp: 200 strip, Rfl: 3    CALCIUM CARBONATE ORAL, Take 1,800 mg by mouth once daily.  , Disp: , Rfl:     carvedilol (COREG) 6.25 MG tablet, Take 1 tablet (6.25 mg total) by mouth 2 (two) times daily., Disp: 180 tablet, Rfl: 1    celecoxib (CELEBREX) 100 MG capsule, Take 1 capsule (100 mg total) by mouth 2 (two) times daily. Take with food, Disp: 180 capsule, Rfl: 1    DOCOSAHEXANOIC ACID/EPA (FISH OIL ORAL), Take 1 capsule by mouth once daily., Disp: , Rfl:     ERGOCALCIFEROL, VITAMIN D2, (VITAMIN D ORAL), Take 1,000 mg by mouth once daily.  , Disp: , Rfl:     fluticasone-salmeterol 250-50 mcg/dose (ADVAIR DISKUS) 250-50 mcg/dose diskus inhaler, Inhale 1 puff into the lungs 2 (two) times daily., Disp: 180 each, Rfl: 3    glipiZIDE (GLUCOTROL) 10 MG tablet, Take 1 tablet (10 mg total) by mouth 2 (two) times daily before meals., Disp: 180 tablet, Rfl: 1    iron-vitamin C 100-250 mg, ICAR-C, 100-250 mg Tab, Take one tablet in the morning with an acidic drink before breakfast., Disp: 90 tablet, Rfl: 0    lancets (TRUEPLUS LANCETS) Misc, Use daily, Disp: 100 each, Rfl: 3    losartan (COZAAR) 50 MG tablet, Take 1 tablet (50 mg total) by mouth once daily., Disp: 90 tablet, Rfl: 1    metFORMIN (GLUCOPHAGE) 1000 MG tablet, TAKE 1 TABLET BY MOUTH TWICE DAILY AFTER  A  MEAL,  "Disp: 180 tablet, Rfl: 2    multivitamin-minerals-lutein (CENTRUM SILVER) Tab, Take 1 tablet by mouth once daily., Disp: 90 tablet, Rfl: 3    nortriptyline (PAMELOR) 25 MG capsule, TAKE ONE CAPSULE BY MOUTH ONCE DAILY IN  THE  [facility]., Disp: 90 capsule, Rfl: 1    omeprazole (PRILOSEC) 40 MG capsule, Take 1 capsule (40 mg total) by mouth once daily., Disp: 90 capsule, Rfl: 1    potassium chloride SA (K-DUR) 20 MEQ tablet, Take 1 tablet (20 mEq total) by mouth once daily., Disp: 90 tablet, Rfl: 3    Current Facility-Administered Medications:     ketorolac injection 30 mg, 30 mg, Intramuscular, 1 time in Clinic/HOD, Nima Lane MD  Blood pressure (!) 146/68, pulse 75, height 5' 2" (1.575 m), weight 87.1 kg (192 lb 0.3 oz).      Neurologic Exam     Mental Status   Oriented to person, place, and time.   Attention: normal. Concentration: normal.   Speech: speech is normal   Level of consciousness: alert  Knowledge: good.     Cranial Nerves     CN II   Visual acuity: normal    CN III, IV, VI   Pupils are equal, round, and reactive to light.  Extraocular motions are normal.     CN V   Facial sensation intact.     CN VII   Facial expression full, symmetric.     CN VIII   Hearing: intact    CN IX, X   Palate: symmetric    CN XI   CN XI normal.     CN XII   CN XII normal.     Motor Exam   Muscle bulk: normal  Overall muscle tone: normal  Right arm pronator drift: absent  Left arm pronator drift: absent    Strength   Right deltoid: 5/5  Left deltoid: 5/5  Right biceps: 5/5  Left biceps: 5/5  Right triceps: 5/5  Left triceps: 5/5  Right wrist flexion: 5/5  Left wrist flexion: 5/5  Right wrist extension: 5/5  Left wrist extension: 5/5  Right interossei: 5/5  Left interossei: 5/5  Right iliopsoas: 5/5  Left iliopsoas: 5/5  Right quadriceps: 5/5  Left quadriceps: 5/5  Right hamstrin/5  Left hamstrin/5  Right anterior tibial: 5/5  Left anterior tibial: 5/5  Right posterior tibial: 5/5  Left posterior tibial: " "5/5  Right peroneal: 5/5  Left peroneal: 5/5  Right gastroc: 5/5  Left gastroc: 5/5    Sensory Exam   Light touch normal.   Sensory deficit distribution on left: L3    Gait, Coordination, and Reflexes     Gait  Gait: normal    Coordination   Romberg: negative  Finger to nose coordination: normal    Tremor   Resting tremor: absent    Reflexes   Right brachioradialis: 1+  Left brachioradialis: 1+  Right biceps: 1+  Left biceps: 1+  Right triceps: 1+  Left triceps: 1+  Right patellar: 1+  Left patellar: 1+  Right achilles: 1+  Left achilles: 1+  Right plantar: upgoing  Left plantar: upgoing  Right Chong: absent  Left Chong: absent  Right ankle clonus: absent  Left ankle clonus: absent      Physical Exam   Constitutional: She is oriented to person, place, and time.   Eyes: Pupils are equal, round, and reactive to light. EOM are normal.   Neurological: She is oriented to person, place, and time. She has a normal Finger-Nose-Finger Test and a normal Romberg Test. Gait normal.   Reflex Scores:       Tricep reflexes are 1+ on the right side and 1+ on the left side.       Bicep reflexes are 1+ on the right side and 1+ on the left side.       Brachioradialis reflexes are 1+ on the right side and 1+ on the left side.       Patellar reflexes are 1+ on the right side and 1+ on the left side.       Achilles reflexes are 1+ on the right side and 1+ on the left side.  Psychiatric: Her speech is normal.       Vital Signs  Pulse: 75  BP: (!) 146/68  Pain Score:   8  Height and Weight  Height: 5' 2" (157.5 cm)  Weight: 87.1 kg (192 lb 0.3 oz)  BSA (Calculated - sq m): 1.95 sq meters  BMI (Calculated): 35.1  Weight in (lb) to have BMI = 25: 136.4]    Provider dictation:  I reviewed the imaging. CT Lumbar spine shows prior L3-4 instrumented fusion with solid bony fusion present. There is a grade I retrolisthesis at L2-L3, with no intability on dynamic XR.    Latoya Helton is a 78 year old female who presents for neurosurgical " "evaluation referred by Jennifer Kellogg PA-C. Patient reports prior lumbar fusion about 20 years ago by Dr. Garcia. At that time she had significant lower back pain which improved. She had numbness in bilateral legs that never improved. She had a spinal cord stimulator placed in 2013 by Dr. Alarcon. The battery was then re-placed in 7/ 2018 by Dr. Alarcon. The back pain has been worsening over the past few years. Patient reports bilateral leg pain and left thigh numbness when walking. It is in the anterior leg and stops at the knee wrapping to the medial aspect. She states she is unable to walk long distances due to the pain. Leaning forward makes the pain worse. She states that sitting in a soft chair makes the pain better. Patient's last JERI was done in 2014 by Dr. Alarcon. She has done PT in the past which made the pain worse. Patient states that the SCS is not MRI compatible.      On exam patient has full strength. Decreased sensation in the left L3 distribution.     I discussed that there are no red flags that would prompt urgent imaging at this point. I discussed that a CT myelogram would provide additional information about the severity of any possible stenosis, but that ultimately, she would need to exhaust conservative treatment, including PT, before any final recommendation regarding surgery can be made. She refused PT and is adamant that "Dr Garcia should have operated on the two levels above, other doctors told me so". I explained that determining the need for further spine surgery would require careful diagnostic work up and the completion of a trial of non-surgical management, overseen by a spine surgeon. She refuses any further work up at this point. Therefore, I will refer her back to Dr Mcmahan for ongoing non-surgical pain management.      Visit Diagnosis:  Lumbar spondylosis      "

## 2019-11-08 RX ORDER — CELECOXIB 100 MG/1
100 CAPSULE ORAL 2 TIMES DAILY
Qty: 180 CAPSULE | Refills: 1 | Status: SHIPPED | OUTPATIENT
Start: 2019-11-08 | End: 2020-02-04 | Stop reason: SDUPTHER

## 2019-11-08 NOTE — TELEPHONE ENCOUNTER
I have been unable tor reach at number left or other number in demographic. Script sent for Celebrex

## 2019-12-23 ENCOUNTER — OFFICE VISIT (OUTPATIENT)
Dept: PRIMARY CARE CLINIC | Facility: CLINIC | Age: 79
End: 2019-12-23
Payer: MEDICARE

## 2019-12-23 VITALS
HEIGHT: 62 IN | WEIGHT: 199.5 LBS | BODY MASS INDEX: 36.71 KG/M2 | HEART RATE: 84 BPM | SYSTOLIC BLOOD PRESSURE: 136 MMHG | OXYGEN SATURATION: 96 % | DIASTOLIC BLOOD PRESSURE: 60 MMHG

## 2019-12-23 DIAGNOSIS — I10 ESSENTIAL HYPERTENSION: ICD-10-CM

## 2019-12-23 DIAGNOSIS — E78.2 MIXED HYPERLIPIDEMIA: ICD-10-CM

## 2019-12-23 DIAGNOSIS — M65.331 TRIGGER FINGER, RIGHT MIDDLE FINGER: ICD-10-CM

## 2019-12-23 DIAGNOSIS — E11.65 UNCONTROLLED TYPE 2 DIABETES MELLITUS WITH HYPERGLYCEMIA: Primary | ICD-10-CM

## 2019-12-23 PROCEDURE — 1101F PR PT FALLS ASSESS DOC 0-1 FALLS W/OUT INJ PAST YR: ICD-10-PCS | Mod: CPTII,S$GLB,, | Performed by: NURSE PRACTITIONER

## 2019-12-23 PROCEDURE — 1126F PR PAIN SEVERITY QUANTIFIED, NO PAIN PRESENT: ICD-10-PCS | Mod: S$GLB,,, | Performed by: NURSE PRACTITIONER

## 2019-12-23 PROCEDURE — 1126F AMNT PAIN NOTED NONE PRSNT: CPT | Mod: S$GLB,,, | Performed by: NURSE PRACTITIONER

## 2019-12-23 PROCEDURE — 99214 PR OFFICE/OUTPT VISIT, EST, LEVL IV, 30-39 MIN: ICD-10-PCS | Mod: S$GLB,,, | Performed by: NURSE PRACTITIONER

## 2019-12-23 PROCEDURE — 3078F PR MOST RECENT DIASTOLIC BLOOD PRESSURE < 80 MM HG: ICD-10-PCS | Mod: CPTII,S$GLB,, | Performed by: NURSE PRACTITIONER

## 2019-12-23 PROCEDURE — 99214 OFFICE O/P EST MOD 30 MIN: CPT | Mod: S$GLB,,, | Performed by: NURSE PRACTITIONER

## 2019-12-23 PROCEDURE — 1159F PR MEDICATION LIST DOCUMENTED IN MEDICAL RECORD: ICD-10-PCS | Mod: S$GLB,,, | Performed by: NURSE PRACTITIONER

## 2019-12-23 PROCEDURE — 3078F DIAST BP <80 MM HG: CPT | Mod: CPTII,S$GLB,, | Performed by: NURSE PRACTITIONER

## 2019-12-23 PROCEDURE — 3075F SYST BP GE 130 - 139MM HG: CPT | Mod: CPTII,S$GLB,, | Performed by: NURSE PRACTITIONER

## 2019-12-23 PROCEDURE — 1101F PT FALLS ASSESS-DOCD LE1/YR: CPT | Mod: CPTII,S$GLB,, | Performed by: NURSE PRACTITIONER

## 2019-12-23 PROCEDURE — 1159F MED LIST DOCD IN RCRD: CPT | Mod: S$GLB,,, | Performed by: NURSE PRACTITIONER

## 2019-12-23 PROCEDURE — 3075F PR MOST RECENT SYSTOLIC BLOOD PRESS GE 130-139MM HG: ICD-10-PCS | Mod: CPTII,S$GLB,, | Performed by: NURSE PRACTITIONER

## 2019-12-23 RX ORDER — LOSARTAN POTASSIUM 50 MG/1
50 TABLET ORAL DAILY
Qty: 90 TABLET | Refills: 1 | Status: SHIPPED | OUTPATIENT
Start: 2019-12-23 | End: 2020-02-21 | Stop reason: SDUPTHER

## 2019-12-23 RX ORDER — GLIPIZIDE 10 MG/1
10 TABLET ORAL
Qty: 180 TABLET | Refills: 1 | Status: SHIPPED | OUTPATIENT
Start: 2019-12-23 | End: 2020-02-04 | Stop reason: SDUPTHER

## 2019-12-23 RX ORDER — METFORMIN HYDROCHLORIDE 1000 MG/1
TABLET ORAL
Qty: 180 TABLET | Refills: 1 | Status: SHIPPED | OUTPATIENT
Start: 2019-12-23 | End: 2020-02-21 | Stop reason: SDUPTHER

## 2019-12-23 RX ORDER — ATORVASTATIN CALCIUM 20 MG/1
20 TABLET, FILM COATED ORAL DAILY
Qty: 90 TABLET | Refills: 1 | Status: SHIPPED | OUTPATIENT
Start: 2019-12-23 | End: 2020-02-21 | Stop reason: SDUPTHER

## 2019-12-23 NOTE — PATIENT INSTRUCTIONS
Diabetic Foot Care  Diabetes can lead to a number of foot complications. Fortunately, you can prevent most of these with a little extra foot care. If diabetes is not well controlled, it can cause damage to blood vessels and result in poor circulation to the foot. When the skin does not get enough blood flow, it becomes prone to pressure sores and ulcers, which heal slowly.  Diabetes can also damage nerves, interfering with the ability to feel pain and pressure. When you cant feel your foot normally, it is easy to injure your skin, bones, and joints without knowing it. For these reasons diabetes increases the risk of fungal infections, bunions, and ulcers. An ulcer is a sore or break in the skin. With ulcers, often the skin seems to have worn away. Deep ulcers can lead to bone infection.  Gangrene is the most serious foot complication of diabetes. It usually occurs on the tips of the toes as blackened areas of skin. The black area is dead tissue. In severe cases, gangrene spreads to involve the entire toe, other toes, and the entire foot. Foot or toe amputation may be required. Good foot care and blood sugar control can prevent this.  Home care  · Wear comfortable, well-fitting shoes.  · Wash your feet daily with warm water and mild soap.  · After drying, apply a moisturizing cream or lotion to the top and bottom of your feet. Don't put lotion between toes.  · Check your feet daily for skin breaks, blisters, swelling, or redness. Look between your toes as well. If you cannot see the bottoms of your feet, ask someone to look or use a mirror.  · Wear cotton socks and change them every day.  · Trim toenails carefully, and do not cut your cuticles.  · Strive to keep your blood sugar under control with a combination of medicines, diet, and activity.  · If you smoke and have diabetes, it is very important that you stop. Smoking reduces blood flow to your foot.  · Schedule foot exams at least every year, or more often if  you have foot problems.  · Put your feet up when sitting, wiggle toes, and move ankles to help improve blood flow.  Avoid activities that increase your risk of foot injury:  · Do not walk barefoot.  · Do not use heating pads or hot water bottles on your feet.  · Do not put your foot in a hot tub without first checking the temperature with your hand.  Follow-up care  Follow up with your healthcare provider, or as advised. Be sure to take off your shoes and socks before your appointment starts so your healthcare provider will be sure to check your feet. Report any cut, puncture, scrape, blister, or other injury to your foot. Also report if you have a bunion, hammertoes, ingrown toenail, or ulcer on your foot.  When to seek medical advice  Call your healthcare provider right away if any of these occur:  · Black skin color anywhere on the foot  · Open ulcer with pus draining from the wound  · Increasing foot or leg pain  · New areas of redness or swelling or tender areas of the foot  · Fever of 100.4°F (38°C) or greater  Date Last Reviewed: 5/25/2016  © 4496-6435 [employer]. [address] [place] [address], [place] [address]. All rights reserved. This information is not intended as a substitute for professional medical care. Always follow your healthcare professional's instructions.

## 2019-12-23 NOTE — PROGRESS NOTES
Subjective:       Patient ID: Latoya Helton is a 78 y.o. female.    Chief Complaint: Diabetes (follow up)  Patient was last seen by me on 09/16/2019.  Last seen by PCP on 07/17/2018.   She is accompanied by her sister.  HPI   She is here to follow up with DM and HTN.  Vitals:    12/23/19 1429   BP: 136/60   Pulse: 84     Review of Systems   Musculoskeletal:        Right middle finger trigger finger       She states all of her home blood sugars are now under 200 and has been as low as 110.          Objective:      Physical Exam   Constitutional: She is oriented to person, place, and time. Vital signs are normal. She appears well-developed and well-nourished. She is cooperative.   HENT:   Head: Normocephalic and atraumatic.   Right Ear: Hearing, tympanic membrane, external ear and ear canal normal.   Left Ear: Hearing, tympanic membrane, external ear and ear canal normal.   Nose: Nose normal.   Mouth/Throat: Uvula is midline, oropharynx is clear and moist and mucous membranes are normal.   Eyes: Conjunctivae and lids are normal.   Neck: Trachea normal, normal range of motion, full passive range of motion without pain and phonation normal. Neck supple.   Cardiovascular: Normal rate, regular rhythm and normal heart sounds.   Pulmonary/Chest: Effort normal and breath sounds normal.   Abdominal: Soft. Bowel sounds are normal. There is no tenderness.   Musculoskeletal: Normal range of motion.        Hands:  Right middle finger trigger finger   Feet:   Right Foot:   Protective Sensation: 10 sites tested. 10 sites sensed.   Skin Integrity: Positive for dry skin. Negative for ulcer, blister, skin breakdown, erythema, warmth or callus.   Left Foot:   Skin Integrity: Positive for dry skin. Negative for ulcer, blister, skin breakdown, erythema, warmth or callus.   Lymphadenopathy:        Head (right side): No submental, no submandibular, no tonsillar, no preauricular, no posterior auricular and no occipital adenopathy present.         Head (left side): No submental, no submandibular, no tonsillar, no preauricular, no posterior auricular and no occipital adenopathy present.     She has no cervical adenopathy.   Neurological: She is alert and oriented to person, place, and time.   Skin: Skin is warm, dry and intact.   Psychiatric: She has a normal mood and affect. Her speech is normal and behavior is normal. Judgment and thought content normal. Cognition and memory are normal.   Nursing note and vitals reviewed.      Assessment & Plan:       Uncontrolled type 2 diabetes mellitus with hyperglycemia  -     Hemoglobin A1c; Future; Expected date: 12/23/2019  -     glipiZIDE (GLUCOTROL) 10 MG tablet; Take 1 tablet (10 mg total) by mouth 2 (two) times daily before meals.  Dispense: 180 tablet; Refill: 1    Uncontrolled type 2 diabetes mellitus with diabetic polyneuropathy, without long-term current use of insulin  -     metFORMIN (GLUCOPHAGE) 1000 MG tablet; TAKE 1 TABLET BY MOUTH TWICE DAILY AFTER  A  MEAL  Dispense: 180 tablet; Refill: 1  -     atorvastatin (LIPITOR) 20 MG tablet; Take 1 tablet (20 mg total) by mouth once daily.  Dispense: 90 tablet; Refill: 1    Essential hypertension  -     losartan (COZAAR) 50 MG tablet; Take 1 tablet (50 mg total) by mouth once daily.  Dispense: 90 tablet; Refill: 1    Mixed hyperlipidemia- Stable, Lipitor  Lipid 06/07/2019 at [facility]:    Total Chol 180  Triglyceride 216  HDL 37    Non- HDL Cholesterol 143    Trigger finger, right middle finger  -     Ambulatory referral to Hand Surgery      Medication List with Changes/Refills   Current Medications    ALBUTEROL (PROVENTIL) 2.5 MG /3 ML (0.083 %) NEBULIZER SOLUTION    Take 3 mLs (2.5 mg total) by nebulization every 4 (four) hours as needed for Wheezing.    ALCOHOL SWABS (BD ALCOHOL SWAB) PADM    Apply 1 each topically once daily.    ASPIRIN (ECOTRIN) 81 MG EC TABLET    Take 1 tablet (81 mg total) by mouth once daily.    BLOOD SUGAR DIAGNOSTIC (BLOOD  GLUCOSE TEST) STRP    Microlet lancets & Contour Test strips. Test BID.    CALCIUM CARBONATE ORAL    Take 1,800 mg by mouth once daily.      CARVEDILOL (COREG) 6.25 MG TABLET    Take 1 tablet (6.25 mg total) by mouth 2 (two) times daily.    CELECOXIB (CELEBREX) 100 MG CAPSULE    Take 1 capsule (100 mg total) by mouth 2 (two) times daily. Take with food    DOCOSAHEXANOIC ACID/EPA (FISH OIL ORAL)    Take 1 capsule by mouth once daily.    ERGOCALCIFEROL, VITAMIN D2, (VITAMIN D ORAL)    Take 1,000 mg by mouth once daily.      FLUTICASONE-SALMETEROL 250-50 MCG/DOSE (ADVAIR DISKUS) 250-50 MCG/DOSE DISKUS INHALER    Inhale 1 puff into the lungs 2 (two) times daily.    FLUZONE HIGH-DOSE 2019-20, PF, 180 MCG/0.5 ML SYRG    PHARMACIST ADMINISTERED IMMUNIZATION ADMINISTERED AT TIME OF DISPENSING    IRON-VITAMIN C 100-250 MG, ICAR-C, 100-250 MG TAB    Take one tablet in the morning with an acidic drink before breakfast.    LANCETS (TRUEPLUS LANCETS) MISC    Use daily    MULTIVITAMIN-MINERALS-LUTEIN (CENTRUM SILVER) TAB    Take 1 tablet by mouth once daily.    NORTRIPTYLINE (PAMELOR) 25 MG CAPSULE    TAKE ONE CAPSULE BY MOUTH ONCE DAILY IN  THE  [facility].    OMEPRAZOLE (PRILOSEC) 40 MG CAPSULE    Take 1 capsule (40 mg total) by mouth once daily.    POTASSIUM CHLORIDE SA (K-DUR) 20 MEQ TABLET    Take 1 tablet (20 mEq total) by mouth once daily.   Changed and/or Refilled Medications    Modified Medication Previous Medication    ATORVASTATIN (LIPITOR) 20 MG TABLET atorvastatin (LIPITOR) 20 MG tablet       Take 1 tablet (20 mg total) by mouth once daily.    Take 1 tablet (20 mg total) by mouth once daily.    GLIPIZIDE (GLUCOTROL) 10 MG TABLET glipiZIDE (GLUCOTROL) 10 MG tablet       Take 1 tablet (10 mg total) by mouth 2 (two) times daily before meals.    Take 1 tablet (10 mg total) by mouth 2 (two) times daily before meals.    LOSARTAN (COZAAR) 50 MG TABLET losartan (COZAAR) 50 MG tablet       Take 1 tablet (50 mg total) by mouth  once daily.    Take 1 tablet (50 mg total) by mouth once daily.    METFORMIN (GLUCOPHAGE) 1000 MG TABLET metFORMIN (GLUCOPHAGE) 1000 MG tablet       TAKE 1 TABLET BY MOUTH TWICE DAILY AFTER  A  MEAL    TAKE 1 TABLET BY MOUTH TWICE DAILY AFTER  A  MEAL         Follow up in about 4 months (around 4/23/2020), or if symptoms worsen or fail to improve.

## 2019-12-26 ENCOUNTER — PATIENT OUTREACH (OUTPATIENT)
Dept: ADMINISTRATIVE | Facility: HOSPITAL | Age: 79
End: 2019-12-26

## 2019-12-26 NOTE — LETTER
AUTHORIZATION FOR RELEASE OF   CONFIDENTIAL INFORMATION    Juan Trinidad OD    We are seeing Latoya Helton, date of birth 1940, in the clinic at [facility]. Scott Moe MD is the patient's PCP. Latoya Helton has an outstanding lab/procedure at the time we reviewed her chart. In order to help keep her health information updated, she has authorized us to request the following medical record(s):         EYE EXAM                  Please fax records to [facility], Timothy L Riddell, MD,  [phone]     If you have any questions, please contact Nayely Spencer, Care Coordinator   at [phone].            Patient Name: Latoya Helton  : 1940  Patient Phone #: [phone]

## 2019-12-27 ENCOUNTER — PATIENT OUTREACH (OUTPATIENT)
Dept: ADMINISTRATIVE | Facility: HOSPITAL | Age: 79
End: 2019-12-27

## 2019-12-31 ENCOUNTER — TELEPHONE (OUTPATIENT)
Dept: ADMINISTRATIVE | Facility: HOSPITAL | Age: 79
End: 2019-12-31

## 2019-12-31 NOTE — TELEPHONE ENCOUNTER
----- Message from Nayely Spencer LPN sent at 12/30/2019  2:14 PM CST -----  Regarding: RE: eye exam requested  Salvador,    Please update lipid and a1c values in our system from Care everywhere.  We need the values placed.  Also mammo and dexa.  Thanks    ----- Message -----  From: Nayely Spencer LPN  Sent: 12/26/2019   9:32 AM CST  To: Salvador Inman LPN, Nayely Spencer LPN  Subject: eye exam requested                               Eye exam requested. CONSTANTINO

## 2020-01-27 ENCOUNTER — TELEPHONE (OUTPATIENT)
Dept: FAMILY MEDICINE | Facility: CLINIC | Age: 80
End: 2020-01-27

## 2020-02-18 ENCOUNTER — TELEPHONE (OUTPATIENT)
Dept: FAMILY MEDICINE | Facility: CLINIC | Age: 80
End: 2020-02-18

## 2020-02-18 NOTE — TELEPHONE ENCOUNTER
----- Message from Sanna De Paz sent at 2/18/2020 10:54 AM CST -----  Type: Needs Medical Advice    Who Called:  Patient  Best Call Back Number: [phone]  Additional Information: Patient requesting to speak with nurse or doctor concerning being referred to new pharmacy/please call back to advise.

## 2020-02-18 NOTE — TELEPHONE ENCOUNTER
Patient called to update chart that she is no longer using mail delivery for a pharmacy and new pharmacy will be [facility] in [place] as of now,

## 2020-02-21 DIAGNOSIS — G25.81 RESTLESS LEG SYNDROME: ICD-10-CM

## 2020-02-21 DIAGNOSIS — E11.65 UNCONTROLLED TYPE 2 DIABETES MELLITUS WITH HYPERGLYCEMIA: ICD-10-CM

## 2020-02-21 DIAGNOSIS — M17.11 PRIMARY OSTEOARTHRITIS OF RIGHT KNEE: ICD-10-CM

## 2020-02-21 DIAGNOSIS — I50.9 CHRONIC CONGESTIVE HEART FAILURE, UNSPECIFIED HEART FAILURE TYPE: ICD-10-CM

## 2020-02-21 DIAGNOSIS — K21.9 GERD WITHOUT ESOPHAGITIS: ICD-10-CM

## 2020-02-21 DIAGNOSIS — I10 ESSENTIAL HYPERTENSION: ICD-10-CM

## 2020-02-21 RX ORDER — POTASSIUM CHLORIDE 20 MEQ/1
20 TABLET, EXTENDED RELEASE ORAL DAILY
Qty: 90 TABLET | Refills: 1 | Status: SHIPPED | OUTPATIENT
Start: 2020-02-21 | End: 2022-03-28 | Stop reason: SDUPTHER

## 2020-02-21 RX ORDER — OMEPRAZOLE 40 MG/1
40 CAPSULE, DELAYED RELEASE ORAL DAILY
Qty: 90 CAPSULE | Refills: 1 | Status: SHIPPED | OUTPATIENT
Start: 2020-02-21 | End: 2020-12-08 | Stop reason: SDUPTHER

## 2020-02-21 RX ORDER — METFORMIN HYDROCHLORIDE 1000 MG/1
TABLET ORAL
Qty: 180 TABLET | Refills: 1 | Status: SHIPPED | OUTPATIENT
Start: 2020-02-21 | End: 2020-10-19

## 2020-02-21 RX ORDER — CARVEDILOL 6.25 MG/1
6.25 TABLET ORAL 2 TIMES DAILY
Qty: 180 TABLET | Refills: 1 | Status: SHIPPED | OUTPATIENT
Start: 2020-02-21 | End: 2020-10-19

## 2020-02-21 RX ORDER — ATORVASTATIN CALCIUM 20 MG/1
20 TABLET, FILM COATED ORAL DAILY
Qty: 90 TABLET | Refills: 1 | Status: SHIPPED | OUTPATIENT
Start: 2020-02-21 | End: 2020-10-08

## 2020-02-21 RX ORDER — CELECOXIB 100 MG/1
100 CAPSULE ORAL DAILY
Qty: 90 CAPSULE | Refills: 1 | Status: SHIPPED | OUTPATIENT
Start: 2020-02-21 | End: 2020-02-28

## 2020-02-21 RX ORDER — GLIPIZIDE 10 MG/1
10 TABLET ORAL
Qty: 180 TABLET | Refills: 1 | Status: SHIPPED | OUTPATIENT
Start: 2020-02-21 | End: 2020-10-26 | Stop reason: SDUPTHER

## 2020-02-21 RX ORDER — LOSARTAN POTASSIUM 50 MG/1
50 TABLET ORAL DAILY
Qty: 90 TABLET | Refills: 1 | Status: SHIPPED | OUTPATIENT
Start: 2020-02-21 | End: 2020-10-08

## 2020-02-21 RX ORDER — NORTRIPTYLINE HYDROCHLORIDE 25 MG/1
CAPSULE ORAL
Qty: 90 CAPSULE | Refills: 1 | Status: SHIPPED | OUTPATIENT
Start: 2020-02-21 | End: 2020-11-11 | Stop reason: SDUPTHER

## 2020-02-21 NOTE — TELEPHONE ENCOUNTER
----- Message from Promise Valdes sent at 2/21/2020  1:50 PM CST -----  Contact: pt  Type: Needs Medical Advice    Who Called:  pt  Best Call Back Number: [phone]  Additional Information: pt needs to speak to someone in the office regarding how she receives her medicine. Pt refuses to disclose any other information. patient is demanding to speak to Rachel. Please call to advise

## 2020-02-21 NOTE — TELEPHONE ENCOUNTER
Called pt, she just wanted to make sure we knew her pharmacy had changed and needs refills sent to her new [facility] pharmacy please advise. meds pending

## 2020-02-28 ENCOUNTER — TELEPHONE (OUTPATIENT)
Dept: FAMILY MEDICINE | Facility: CLINIC | Age: 80
End: 2020-02-28

## 2020-02-28 RX ORDER — MELOXICAM 15 MG/1
15 TABLET ORAL DAILY
Qty: 90 TABLET | Refills: 0 | Status: SHIPPED | OUTPATIENT
Start: 2020-02-28 | End: 2020-04-27 | Stop reason: SDUPTHER

## 2020-02-28 NOTE — TELEPHONE ENCOUNTER
Patient called clinic with c/o expensive medications since change of insurance/pharmacies. Notified patient to contact insurance company to see what alternatives can be prescribed to accommodate patient. She verbalized  understanding

## 2020-02-28 NOTE — TELEPHONE ENCOUNTER
Patient called to get her diabetic education appt rescheduled informed pt that she would have to go through that dept to rescheduled    Also, patient states her celebrex medication is to expensive to be filled and she cannot afford it, wanting something to be called in as an alternative. Please advise

## 2020-02-28 NOTE — TELEPHONE ENCOUNTER
----- Message from Bernarda Blankenship sent at 2/28/2020  9:10 AM CST -----    Pt is calling to  Reschedule   Her  Sonal  With    Diabetic  Educator// please call [phone]  Pt  Stated   The  Sonal  Was  Booked  thr  This  Office

## 2020-02-28 NOTE — TELEPHONE ENCOUNTER
----- Message from Macho Ordonez sent at 2/28/2020  8:19 AM CST -----  Contact: Ptnt   [phone] (C)  Type: Needs Medical Advice    Who Called: Ptnt   [phone] (C)    Additional Information:     Advised needs to speak with the nurse about all her medications. Please call to discuss.

## 2020-02-28 NOTE — TELEPHONE ENCOUNTER
Please notify that I have sent mobic to her pharmacy to replace celebrex and remind her to eat when taking it.

## 2020-03-24 ENCOUNTER — PATIENT OUTREACH (OUTPATIENT)
Dept: ADMINISTRATIVE | Facility: OTHER | Age: 80
End: 2020-03-24

## 2020-04-24 ENCOUNTER — TELEPHONE (OUTPATIENT)
Dept: FAMILY MEDICINE | Facility: CLINIC | Age: 80
End: 2020-04-24

## 2020-04-24 NOTE — TELEPHONE ENCOUNTER
----- Message from Sharon Chacon sent at 4/24/2020  4:22 PM CDT -----  Contact: patient  Type:  Patient Returning Call    Who Called:  patient  Who Left Message for Patient:  ?  Does the patient know what this is regarding?:  no  Best Call Back Number:  [phone]  Additional Information:  Patient does not want virtual visit.  Please return call to schedule audio for Monday.  Thanks!

## 2020-04-27 ENCOUNTER — OFFICE VISIT (OUTPATIENT)
Dept: PRIMARY CARE CLINIC | Facility: CLINIC | Age: 80
End: 2020-04-27
Payer: MEDICARE

## 2020-04-27 DIAGNOSIS — M51.36 DDD (DEGENERATIVE DISC DISEASE), LUMBAR: ICD-10-CM

## 2020-04-27 DIAGNOSIS — I50.9 CHRONIC CONGESTIVE HEART FAILURE, UNSPECIFIED HEART FAILURE TYPE: ICD-10-CM

## 2020-04-27 DIAGNOSIS — E66.01 MORBID (SEVERE) OBESITY DUE TO EXCESS CALORIES: ICD-10-CM

## 2020-04-27 DIAGNOSIS — J44.9 CHRONIC OBSTRUCTIVE PULMONARY DISEASE, UNSPECIFIED COPD TYPE: ICD-10-CM

## 2020-04-27 PROCEDURE — 99442 PR PHYSICIAN TELEPHONE EVALUATION 11-20 MIN: ICD-10-PCS | Mod: 95,,, | Performed by: NURSE PRACTITIONER

## 2020-04-27 PROCEDURE — 99442 PR PHYSICIAN TELEPHONE EVALUATION 11-20 MIN: CPT | Mod: 95,,, | Performed by: NURSE PRACTITIONER

## 2020-04-27 RX ORDER — MELOXICAM 15 MG/1
15 TABLET ORAL DAILY
Qty: 90 TABLET | Refills: 0 | Status: SHIPPED | OUTPATIENT
Start: 2020-04-27 | End: 2021-05-24

## 2020-04-27 NOTE — PROGRESS NOTES
Subjective:       Patient ID: Latoya Helton is a 79 y.o. female.    Chief Complaint: No chief complaint on file.  She was last seen in primary care by me on 12/23/2019.  HPI  There were no vitals filed for this visit.  Review of Systems    The patient location is: [place], [place]  The chief complaint leading to consultation is: Follow up DM and medication refills.  Visit type: audio only  Total time spent with patient: 2:15-2:37 pm  Each patient to whom he or she provides medical services by telemedicine is:  (1) informed of the relationship between the physician and patient and the respective role of any other health care provider with respect to management of the patient; and (2) notified that he or she may decline to receive medical services by telemedicine and may withdraw from such care at any time.    Notes:   States Celebrex was too expensive and is now taking meloxicam  Home [employer] home [facility] this morning because ate late   Last HGBA1c at [facility] on 02/03/20 was 8.4  She states taking her medication every day as ordered  Objective:      Physical Exam   Constitutional: She is cooperative.   Neurological: She is alert.   Psychiatric: She has a normal mood and affect. Her speech is normal and behavior is normal. Judgment and thought content normal. Cognition and memory are normal.       Assessment & Plan:       Type 2 diabetes, uncontrolled, with neuropathy  -     Comprehensive metabolic panel; Future; Expected date: 04/27/2020  -     Hemoglobin A1c; Future; Expected date: 04/27/2020    DDD (degenerative disc disease), lumbar  -     meloxicam (MOBIC) 15 MG tablet; Take 1 tablet (15 mg total) by mouth once daily. Take with food  Dispense: 90 tablet; Refill: 0    Morbid (severe) obesity due to excess calories    Chronic congestive heart failure, unspecified heart failure type    Chronic obstructive pulmonary disease, unspecified COPD type      Encouraged to begin walking daily for 10-15 minutes daily  She has been  instructed to take all med's as ordered with no changes.  Medication List with Changes/Refills   Current Medications    ALBUTEROL (PROVENTIL) 2.5 MG /3 ML (0.083 %) NEBULIZER SOLUTION    Take 3 mLs (2.5 mg total) by nebulization every 4 (four) hours as needed for Wheezing.    ALCOHOL SWABS (BD ALCOHOL SWAB) PADM    Apply 1 each topically once daily.    ASPIRIN (ECOTRIN) 81 MG EC TABLET    Take 1 tablet (81 mg total) by mouth once daily.    ATORVASTATIN (LIPITOR) 20 MG TABLET    Take 1 tablet (20 mg total) by mouth once daily.    BLOOD SUGAR DIAGNOSTIC (BLOOD GLUCOSE TEST) STRP    Microlet lancets & Contour Test strips. Test BID.    CALCIUM CARBONATE ORAL    Take 1,800 mg by mouth once daily.      CARVEDILOL (COREG) 6.25 MG TABLET    Take 1 tablet (6.25 mg total) by mouth 2 (two) times daily.    DOCOSAHEXANOIC ACID/EPA (FISH OIL ORAL)    Take 1 capsule by mouth once daily.    ERGOCALCIFEROL, VITAMIN D2, (VITAMIN D ORAL)    Take 1,000 mg by mouth once daily.      FLUTICASONE-SALMETEROL 250-50 MCG/DOSE (ADVAIR DISKUS) 250-50 MCG/DOSE DISKUS INHALER    Inhale 1 puff into the lungs 2 (two) times daily.    FLUZONE HIGH-DOSE 2019-20, PF, 180 MCG/0.5 ML SYRG    PHARMACIST ADMINISTERED IMMUNIZATION ADMINISTERED AT TIME OF DISPENSING    GLIPIZIDE (GLUCOTROL) 10 MG TABLET    Take 1 tablet (10 mg total) by mouth 2 (two) times daily before meals.    IRON-VITAMIN C 100-250 MG, ICAR-C, 100-250 MG TAB    Take one tablet in the morning with an acidic drink before breakfast.    LANCETS (TRUEPLUS LANCETS) MISC    Use daily    LOSARTAN (COZAAR) 50 MG TABLET    Take 1 tablet (50 mg total) by mouth once daily.    METFORMIN (GLUCOPHAGE) 1000 MG TABLET    TAKE 1 TABLET BY MOUTH TWICE DAILY AFTER  A  MEAL    MULTIVITAMIN-MINERALS-LUTEIN (CENTRUM SILVER) TAB    Take 1 tablet by mouth once daily.    NORTRIPTYLINE (PAMELOR) 25 MG CAPSULE    TAKE ONE CAPSULE BY MOUTH ONCE DAILY IN  THE  [facility].    OMEPRAZOLE (PRILOSEC) 40 MG CAPSULE    Take 1  capsule (40 mg total) by mouth once daily.    POTASSIUM CHLORIDE SA (K-DUR) 20 MEQ TABLET    Take 1 tablet (20 mEq total) by mouth once daily.   Changed and/or Refilled Medications    Modified Medication Previous Medication    MELOXICAM (MOBIC) 15 MG TABLET meloxicam (MOBIC) 15 MG tablet       Take 1 tablet (15 mg total) by mouth once daily. Take with food    Take 1 tablet (15 mg total) by mouth once daily. Take with food         Follow up in about 3 months (around 7/27/2020).

## 2020-04-27 NOTE — Clinical Note
Please be sure to mail her lab orders so that she can have them done next month at [facility] in [place].

## 2020-05-12 ENCOUNTER — PATIENT OUTREACH (OUTPATIENT)
Dept: ADMINISTRATIVE | Facility: OTHER | Age: 80
End: 2020-05-12

## 2020-05-13 ENCOUNTER — CLINICAL SUPPORT (OUTPATIENT)
Dept: DIABETES | Facility: CLINIC | Age: 80
End: 2020-05-13
Payer: MEDICARE

## 2020-05-13 PROCEDURE — 99999 PR PBB SHADOW E&M-EST. PATIENT-LVL I: ICD-10-PCS | Mod: PBBFAC,,, | Performed by: DIETITIAN, REGISTERED

## 2020-05-13 PROCEDURE — 99999 PR PBB SHADOW E&M-EST. PATIENT-LVL I: CPT | Mod: PBBFAC,,, | Performed by: DIETITIAN, REGISTERED

## 2020-05-13 PROCEDURE — G0108 PR DIAB MANAGE TRN  PER INDIV: ICD-10-PCS | Mod: S$GLB,,, | Performed by: DIETITIAN, REGISTERED

## 2020-05-13 PROCEDURE — G0108 DIAB MANAGE TRN  PER INDIV: HCPCS | Mod: S$GLB,,, | Performed by: DIETITIAN, REGISTERED

## 2020-05-13 NOTE — PROGRESS NOTES
Diabetes Education  Author: Kelsea Loya RD, CDE  Date: 5/13/2020    ..Diabetes Care Specialist Telehealth Visit Note     It was in the patient's best interest to receive diabetes self-management education and support services in this format to prevent the exposure to COVID-19.        Established Patient - Audio Only Telehealth Visit  The patient location is: home   The chief complaint leading to consultation is: Diabetes    Visit type: Virtual visit with audio only (telephone)  Total time spent with patient: 25 min      The reason for the audio only service rather than synchronous audio and video virtual visit was related to technical difficulties or patient preference/necessity.     Each patient to whom I provide medical services by telemedicine is:  (1) informed of the relationship between the physician and patient and the respective role of any other health care provider with respect to management of the patient; and (2) notified that they may decline to receive medical services by telemedicine and may withdraw from such care at any time. Patient verbally consented to receive this service via voice-only telephone call.      Diabetes Care Management Summary  Diabetes Education Record Assessment/Progress: Initial  Current Diabetes Risk Level: Moderate     Last A1c:   Lab Results   Component Value Date    HGBA1C 9.3 (A) 10/03/2019     Last visit with Diabetes Educator: : 05/13/2020    Diabetes Type  Diabetes Type : Type II    Diabetes History  Current Treatment: Oral Medication(Glipizide 10 mg BID, metformin 1000 mg BID)    Health Maintenance was reviewed today with patient. Discussed with patient importance of routine eye exams, foot exams/foot care, blood work (i.e.: A1c, microalbumin, and lipid), dental visits, yearly flu vaccine, and pneumonia vaccine as indicated by PCP. Patient verbalized understanding.     Health Maintenance Topics with due status: Not Due       Topic Last Completion Date    Lipid Panel  06/07/2019    DEXA SCAN 06/17/2019    Mammogram 08/05/2019    Aspirin/Antiplatelet Therapy 12/23/2019    Foot Exam 12/23/2019     Health Maintenance Due   Topic Date Due    TETANUS VACCINE  12[birth year]28/1958    Eye Exam  02/12/2019    Hemoglobin A1c  05/03/2020     Nutrition  Meal Planning: 3 meals per day, eats out seldom, snacks between meal, artificial sweeteners(Patient lives with sister, reports cooking is done by herself, sister, and her son)  What type of sweetener do you use?: Sweet N Low  What type of beverages do you drink?: juice, water, diet soda/tea  Meal Plan 24 Hour Recall - Breakfast: cooked oatmeal, coffee (sweet and low, creamer)  Meal Plan 24 Hour Recall - Lunch: roast beef, bread, french fries, caffeine free diet coke  Meal Plan 24 Hour Recall - Dinner: meat, potatoes, vegetables  Meal Plan 24 Hour Recall - Snack: dark chocolate    Monitoring   Monitoring: Contour Next EZ  Self Monitoring : Reports checking glucose 4 times daily  Blood Glucose Logs: No(Self reports glucose fasting has been running > 180 because she is eating later at night.  Reports bedtime readings in the 170s)  Do you use a personal continuous glucose monitor?: No  In the last month, how often have you had a low blood sugar reaction?: never  Can you tell when your blood sugar is too high?: no    Exercise   Exercise Type: walking(Walks araound horseshoe driveway once a day)  Intensity: Low  Frequency: 3-5 Times per week  Duration: 15 min    Current Diabetes Treatment   Current Treatment: Oral Medication(Glipizide 10 mg BID, metformin 1000 mg BID)    Social History  Preferred Learning Method: Face to Face, Reading Materials  Primary Support: Self, Family  Smoking Status: Ex Smoker  Alcohol Use: Never    Barriers to Change  Barriers to Change: None  Learning Challenges : None    Readiness to Learn   Readiness to Learn : Acceptance    Cultural Influences  Cultural Influences: None    Diabetes Education Assessment/Progress  Diabetes  "Disease Process (diabetes disease process and treatment options): Discussion, Demonstrates Understanding/Competency(verbalizes/demonstrates).  Reviewed patient's last Hgb A1c of 8.4% (2/3/2020)--discussed importance of getting diabetes under better control, goal A1c < 8% at next lab and ideally would like Hgb A1c < 7.5%.      Nutrition (Incorporating nutritional management into one's lifestyle): Discussion, Needs Review.  Patient does some cooking but shares responsibilities of cooking with sister, son, and neighbor.  Often, does not have control of which foods are prepared--discussed that patient will need to limit portions when eating since she has no choice which foods are being prepared or sent to her.  Reviewed basic food groups with patient (carbohydrate, protein, and fat).   Carbohydrate sources reviewed in detail.  Typical food intake obtained from patient. Discussed portion sizes.  Patient encouraged to measure food portions or can limit carbohydrate portions at meals to a "fistful".      Physical Activity (incorporating physical activity into one's lifestyle): Discussion, Comprehends Key Points.  Patient walks in driveway with sister--states cannot walk driveway more than once at a time due to her sister's health.  Recommended to walk an additional lag in driveway (sister does not have to walk with her but can watch).      Medications (states correct name, dose, onset, peak, duration, side effects & timing of meds): Discussion, Demonstrates Understanding/Competency(verbalizes/demonstrates).  Patient reports compliance with glipizide and metformin at this time.  Asking today about injectables--sister is on Victoza and was told it would help her lose weight and better control her diabetes.  Will send EPIC message to PCP for guidance regarding initiation of GLP-1 medication.       Monitoring (monitoring blood glucose/other parameters & using results): Discussion, Demonstrates Understanding/Competency " (verbalizes/demonstrates).  Did not have glucose logs today near her when on phone visit.  Discussed need to see glucose logs to determine glucose patterns and for diabetes medication management.      Acute Complications (preventing, detecting, and treating acute complications): Discussion, Demonstrates Understanding/Competency (verbalizes/demonstrates).  Denies any hypoglycemic epsisodes in over a year.      Chronic Complications (preventing, detecting, and treating chronic complications): Discussion, Needs Review.  Discussed long term side effects/risk factors of uncontrolled diabetes.      Clinical (diabetes, other pertinent medical history, and relevant comorbidities reviewed during visit): Discussion, Comprehends Key Points.  History includes obesity, CAD, COPD, heart failure, ABEL, hyperlipidemia, hypertension    Behavioral (readiness for change, lifestyle practices, self-care behaviors): Discussion, Needs Review.  Discussed avoiding fruit juice (told me today she had just purchased fruit punch).  Will work on limiting chocolate and candies in between meals.  Decrease portions at meals since she is limited with food choices.      Goals  Patient has selected/evaluated goals during today's session: Yes, selected  Monitoring: Set(Bring or send in glucose log for review within 2-4 weeks. )  Start Date: 05/13/20  Target Date: 07/13/20  Medications: In Progress    Diabetes Care Plan/Intervention  Education Plan/Intervention:   1.  No glucose logs--patient due for Hgb A1c as of early May 2020 but uncertain if hospital is open to have labs drawn, wants to make sure it is safe due to COVID-19 concerns.  Patient asked to keep written glucose logs--reports she is checking glucose 4 times daily (before meals and at bedtime).    2.  Patient asking about starting GLP-1 medication as her sister is taking this for diabetes--will send message to PCP about possible prescription.  May want to wait until next HgbA 1c drawn and  glucose logs reviewed.    3.  Limited with meal planning as meals often prepared for patient and at times eating very late (close to midnight)--discussed patient limiting portion sizes since she is unable to change the types of foods prepared for her.    4.  Would benefit from additional walking--asked patient to consider walking driveway twice a day instead of once a day for added exercise.      Diabetes Meal Plan  Restrictions: Restricted Carbohydrate  Carbohydrate Per Meal: 30-45g  Carbohydrate Per Snack : 7-15g    Today's Self-Management Care Plan was developed with the patient's input and is based on barriers identified during today's assessment.    The long and short-term goals in the care plan were written with the patient/caregiver's input. The patient has agreed to work toward these goals to improve her overall diabetes control.      The patient received a copy of today's self-management plan and verbalized understanding of the care plan, goals, and all of today's instructions.      The patient was encouraged to communicate with her physician and care team regarding her condition(s) and treatment.  I provided the patient with my contact information today and encouraged her to contact me via phone or patient portal as needed.     Education Units of Time   Time Spent: 30 min

## 2020-05-13 NOTE — Clinical Note
Patient is asking about starting a GLP-1 medication (Victoza, Trulicity, etc) as her sister is on this diabetic medication. Seen telephonically--no glucose logs but does report fasting glucose in the 170-200 mg/dL range and bedtime readings in the 170s.  Is due for Hgb A1c but was waiting to get checked due to COVID-19 concerns.  Please advise.

## 2020-05-18 ENCOUNTER — TELEPHONE (OUTPATIENT)
Dept: FAMILY MEDICINE | Facility: CLINIC | Age: 80
End: 2020-05-18

## 2020-05-18 NOTE — TELEPHONE ENCOUNTER
----- Message from Kaylene Gallegos sent at 5/18/2020  5:24 PM CDT -----  Contact: Pt  Pt called and really wants to speak to a nurse from your office     Pt can be reached at [phone]

## 2020-05-18 NOTE — TELEPHONE ENCOUNTER
Called pt, she stated Ashlee was supposed to send in refills to [facility]. I advised 3 month and 1 refill was sent in on 2/21/20. So she should have one refill left. She stated she was in [facility] and will check into it. If nothing she will call tomorrow.

## 2020-06-09 ENCOUNTER — TELEPHONE (OUTPATIENT)
Dept: FAMILY MEDICINE | Facility: CLINIC | Age: 80
End: 2020-06-09

## 2020-06-09 NOTE — TELEPHONE ENCOUNTER
----- Message from Ashlee Porter DNP, APRN sent at 4/27/2020  3:15 PM CDT -----  Please be sure to mail her lab orders so that she can have them done next month at [facility] in [place].

## 2020-07-29 ENCOUNTER — TELEPHONE (OUTPATIENT)
Dept: PAIN MEDICINE | Facility: CLINIC | Age: 80
End: 2020-07-29

## 2020-07-29 NOTE — TELEPHONE ENCOUNTER
"----- Message from Macho Ordonez sent at 7/29/2020 11:56 AM CDT -----  Regarding: Stimulator not working in her back.  Type: Needs Medical Advice    Who Called:  Ptnt  [phone]    Symptoms (please be specific):  Stimulator in her back is not working at this time. Pain level "10."    How long has patient had these symptoms:  Past two days.    Additional Information:     Advised needs to speak with the nurse as soon as possible for her condition.       "

## 2020-10-26 ENCOUNTER — OFFICE VISIT (OUTPATIENT)
Dept: PRIMARY CARE CLINIC | Facility: CLINIC | Age: 80
End: 2020-10-26
Payer: MEDICARE

## 2020-10-26 DIAGNOSIS — Z12.39 ENCOUNTER FOR OTHER SCREENING FOR MALIGNANT NEOPLASM OF BREAST: ICD-10-CM

## 2020-10-26 DIAGNOSIS — I10 ESSENTIAL HYPERTENSION: ICD-10-CM

## 2020-10-26 DIAGNOSIS — E11.65 UNCONTROLLED TYPE 2 DIABETES MELLITUS WITH HYPERGLYCEMIA: ICD-10-CM

## 2020-10-26 DIAGNOSIS — E78.2 MIXED HYPERLIPIDEMIA: ICD-10-CM

## 2020-10-26 DIAGNOSIS — E66.01 MORBID (SEVERE) OBESITY DUE TO EXCESS CALORIES: ICD-10-CM

## 2020-10-26 DIAGNOSIS — Z12.31 ENCOUNTER FOR SCREENING MAMMOGRAM FOR MALIGNANT NEOPLASM OF BREAST: ICD-10-CM

## 2020-10-26 PROCEDURE — 99214 PR OFFICE/OUTPT VISIT, EST, LEVL IV, 30-39 MIN: ICD-10-PCS | Mod: S$GLB,,, | Performed by: NURSE PRACTITIONER

## 2020-10-26 PROCEDURE — 99214 OFFICE O/P EST MOD 30 MIN: CPT | Mod: S$GLB,,, | Performed by: NURSE PRACTITIONER

## 2020-10-26 PROCEDURE — 3075F PR MOST RECENT SYSTOLIC BLOOD PRESS GE 130-139MM HG: ICD-10-PCS | Mod: CPTII,S$GLB,, | Performed by: NURSE PRACTITIONER

## 2020-10-26 PROCEDURE — 1101F PR PT FALLS ASSESS DOC 0-1 FALLS W/OUT INJ PAST YR: ICD-10-PCS | Mod: CPTII,S$GLB,, | Performed by: NURSE PRACTITIONER

## 2020-10-26 PROCEDURE — 3075F SYST BP GE 130 - 139MM HG: CPT | Mod: CPTII,S$GLB,, | Performed by: NURSE PRACTITIONER

## 2020-10-26 PROCEDURE — 1101F PT FALLS ASSESS-DOCD LE1/YR: CPT | Mod: CPTII,S$GLB,, | Performed by: NURSE PRACTITIONER

## 2020-10-26 PROCEDURE — 1126F AMNT PAIN NOTED NONE PRSNT: CPT | Mod: S$GLB,,, | Performed by: NURSE PRACTITIONER

## 2020-10-26 PROCEDURE — 1159F PR MEDICATION LIST DOCUMENTED IN MEDICAL RECORD: ICD-10-PCS | Mod: S$GLB,,, | Performed by: NURSE PRACTITIONER

## 2020-10-26 PROCEDURE — 1126F PR PAIN SEVERITY QUANTIFIED, NO PAIN PRESENT: ICD-10-PCS | Mod: S$GLB,,, | Performed by: NURSE PRACTITIONER

## 2020-10-26 PROCEDURE — 3080F DIAST BP >= 90 MM HG: CPT | Mod: CPTII,S$GLB,, | Performed by: NURSE PRACTITIONER

## 2020-10-26 PROCEDURE — 1159F MED LIST DOCD IN RCRD: CPT | Mod: S$GLB,,, | Performed by: NURSE PRACTITIONER

## 2020-10-26 PROCEDURE — 3080F PR MOST RECENT DIASTOLIC BLOOD PRESSURE >= 90 MM HG: ICD-10-PCS | Mod: CPTII,S$GLB,, | Performed by: NURSE PRACTITIONER

## 2020-10-26 RX ORDER — GLIPIZIDE 10 MG/1
10 TABLET ORAL
Qty: 180 TABLET | Refills: 1 | Status: SHIPPED | OUTPATIENT
Start: 2020-10-26 | End: 2021-02-22 | Stop reason: SDUPTHER

## 2020-10-26 RX ORDER — ATORVASTATIN CALCIUM 20 MG/1
20 TABLET, FILM COATED ORAL DAILY
Qty: 90 TABLET | Refills: 1 | Status: SHIPPED | OUTPATIENT
Start: 2020-10-26 | End: 2021-02-22 | Stop reason: SDUPTHER

## 2020-10-26 RX ORDER — METFORMIN HYDROCHLORIDE 1000 MG/1
TABLET ORAL
Qty: 180 TABLET | Refills: 1 | Status: SHIPPED | OUTPATIENT
Start: 2020-10-26 | End: 2021-02-22 | Stop reason: SDUPTHER

## 2020-10-26 RX ORDER — LOSARTAN POTASSIUM 50 MG/1
50 TABLET ORAL DAILY
Qty: 90 TABLET | Refills: 1 | Status: SHIPPED | OUTPATIENT
Start: 2020-10-26 | End: 2021-02-22 | Stop reason: SDUPTHER

## 2020-10-26 NOTE — PROGRESS NOTES
Subjective:       Patient ID: Latoya Helton is a 79 y.o. female.    Chief Complaint: Follow-up and Medication Refill (coreg, glipizide, nortriptilyne)  Patient was last seen by me on 04/27/2020.  Last seen by PCP on 07/17/2018.  HPI   She is here for follow up DM and HTN and need medication refills.  States slipped and fell on knees in kitchen last night. Fell on concrete floor with lanoleoum.  States minimal soreness to knees today but improved. Did notDictation #1  MRN:[id number]  CSN:[id number]   hit head or loss of consciousness.   Vitals:    10/28/20 0813   BP: 136/88   Pulse:    Temp:      BP Readings from Last 3 Encounters:   10/28/20 136/88   12/23/19 136/60   11/05/19 (!) 146/68     Review of Systems      HEMOGLOBIN [id number]: 2/3/2020 5:37 PM  [facility]  Component Name Value Ref Range   HEMOGLOBIN A1C 8.4 (H) 4.7 - 5.6 %   EST. AVG GLUCOSE, A1C 194 (H) <=115 mg/dL   Specimen Collected on   Blood 2/3/2020 2:35 PM   Result Narrative   HGB A1C INTERPRETATION   NORMAL:       <5.7%   PRE-DIABETES: 5.7 - 6.4%   DIABETES:     6.5% OR GREATER    Reference Ranges HgbA1C  4.7%-5.6% Normal   5.7%-6.4% Increased Risk for Diabetes  >6.4% (Confirmed) Diagnostic of Diabetes  <7.0%  Adult Glycemic Control Target      States CBS at home in the 200's  Objective:      Physical Exam  Vitals signs and nursing note reviewed.   Constitutional:       Appearance: Normal appearance. She is well-developed and well-groomed. She is obese.   HENT:      Head: Normocephalic and atraumatic.      Right Ear: Hearing, tympanic membrane, ear canal and external ear normal.      Left Ear: Hearing, tympanic membrane, ear canal and external ear normal.   Eyes:      General: Lids are normal.         Right eye: No foreign body or discharge.         Left eye: No foreign body or discharge.   Neck:      Musculoskeletal: Normal range of motion.   Cardiovascular:      Rate and Rhythm: Normal rate and regular rhythm.       Heart sounds: Normal heart sounds.   Pulmonary:      Effort: Pulmonary effort is normal.      Breath sounds: Normal breath sounds.   Abdominal:      General: Abdomen is protuberant. Bowel sounds are normal.      Palpations: Abdomen is soft.      Tenderness: There is no abdominal tenderness.   Musculoskeletal:      Right lower leg: No edema.      Left lower leg: No edema.   Lymphadenopathy:      Head:      Right side of head: No submental, submandibular, tonsillar, preauricular, posterior auricular or occipital adenopathy.      Left side of head: No submental, submandibular, tonsillar, preauricular, posterior auricular or occipital adenopathy.      Cervical: No cervical adenopathy.      Right cervical: No superficial, deep or posterior cervical adenopathy.     Left cervical: No superficial, deep or posterior cervical adenopathy.   Skin:     General: Skin is warm and dry.      Findings: No rash.   Neurological:      General: No focal deficit present.      Mental Status: She is alert and oriented to person, place, and time.   Psychiatric:         Attention and Perception: Attention and perception normal.         Mood and Affect: Mood and affect normal.         Speech: Speech normal.         Behavior: Behavior normal. Behavior is cooperative.         Thought Content: Thought content normal.         Cognition and Memory: Cognition and memory normal.         Judgment: Judgment normal.         Assessment & Plan:       Uncontrolled type 2 diabetes mellitus with diabetic polyneuropathy, without long-term current use of insulin  -     Hemoglobin A1C; Future; Expected date: 10/26/2020  -     metFORMIN (GLUCOPHAGE) 1000 MG tablet; Take twice daily with meals  Dispense: 180 tablet; Refill: 1  -     atorvastatin (LIPITOR) 20 MG tablet; Take 1 tablet (20 mg total) by mouth once daily.  Dispense: 90 tablet; Refill: 1    Uncontrolled type 2 diabetes mellitus with hyperglycemia  -     Hemoglobin A1C; Future; Expected date:  10/26/2020  -     glipiZIDE (GLUCOTROL) 10 MG tablet; Take 1 tablet (10 mg total) by mouth 2 (two) times daily before meals.  Dispense: 180 tablet; Refill: 1    Essential hypertension  -     Comprehensive Metabolic Panel; Future; Expected date: 10/26/2020  -     losartan (COZAAR) 50 MG tablet; Take 1 tablet (50 mg total) by mouth once daily.  Dispense: 90 tablet; Refill: 1    Mixed hyperlipidemia  -     Lipid Panel; Future; Expected date: 10/26/2020    Morbid (severe) obesity due to excess calories    Encounter for other screening for malignant neoplasm of breast  -     Mammo Digital Screening Bilat; Future; Expected date: 10/26/2020    Encounter for screening mammogram for malignant neoplasm of breast   -     Mammo Digital Screening Bilat; Future; Expected date: 10/26/2020    States will get flu vaccine at her local pharmacy  I have discussed with her that she should use her CPAP device nightly   Heart healthy dietary intake discussed, decrease saturated fat and concentrated sugars.      Medication List with Changes/Refills   Current Medications    ALBUTEROL (PROVENTIL) 2.5 MG /3 ML (0.083 %) NEBULIZER SOLUTION    Take 3 mLs (2.5 mg total) by nebulization every 4 (four) hours as needed for Wheezing.    ALCOHOL SWABS (BD ALCOHOL SWAB) PADM    Apply 1 each topically once daily.    ASPIRIN (ECOTRIN) 81 MG EC TABLET    Take 1 tablet (81 mg total) by mouth once daily.    BLOOD SUGAR DIAGNOSTIC (BLOOD GLUCOSE TEST) STRP    Microlet lancets & Contour Test strips. Test BID.    CALCIUM CARBONATE ORAL    Take 1,800 mg by mouth once daily.      CARVEDILOL (COREG) 6.25 MG TABLET    Take 1 tablet by mouth twice daily    DOCOSAHEXANOIC ACID/EPA (FISH OIL ORAL)    Take 1 capsule by mouth once daily.    ERGOCALCIFEROL, VITAMIN D2, (VITAMIN D ORAL)    Take 1,000 mg by mouth once daily.      FLUTICASONE-SALMETEROL 250-50 MCG/DOSE (ADVAIR DISKUS) 250-50 MCG/DOSE DISKUS INHALER    Inhale 1 puff into the lungs 2 (two) times daily.     FLUZONE HIGH-DOSE 2019-20, PF, 180 MCG/0.5 ML SYRG    PHARMACIST ADMINISTERED IMMUNIZATION ADMINISTERED AT TIME OF DISPENSING    IRON-VITAMIN C 100-250 MG, ICAR-C, 100-250 MG TAB    Take one tablet in the morning with an acidic drink before breakfast.    LANCETS (TRUEPLUS LANCETS) MISC    Use daily    MELOXICAM (MOBIC) 15 MG TABLET    Take 1 tablet (15 mg total) by mouth once daily. Take with food    MULTIVITAMIN-MINERALS-LUTEIN (CENTRUM SILVER) TAB    Take 1 tablet by mouth once daily.    NORTRIPTYLINE (PAMELOR) 25 MG CAPSULE    TAKE ONE CAPSULE BY MOUTH ONCE DAILY IN  THE  [facility].    OMEPRAZOLE (PRILOSEC) 40 MG CAPSULE    Take 1 capsule (40 mg total) by mouth once daily.    POTASSIUM CHLORIDE SA (K-DUR) 20 MEQ TABLET    Take 1 tablet (20 mEq total) by mouth once daily.   Changed and/or Refilled Medications    Modified Medication Previous Medication    ATORVASTATIN (LIPITOR) 20 MG TABLET atorvastatin (LIPITOR) 20 MG tablet       Take 1 tablet (20 mg total) by mouth once daily.    Take 1 tablet by mouth once daily    GLIPIZIDE (GLUCOTROL) 10 MG TABLET glipiZIDE (GLUCOTROL) 10 MG tablet       Take 1 tablet (10 mg total) by mouth 2 (two) times daily before meals.    Take 1 tablet (10 mg total) by mouth 2 (two) times daily before meals.    LOSARTAN (COZAAR) 50 MG TABLET losartan (COZAAR) 50 MG tablet       Take 1 tablet (50 mg total) by mouth once daily.    Take 1 tablet by mouth once daily    METFORMIN (GLUCOPHAGE) 1000 MG TABLET metFORMIN (GLUCOPHAGE) 1000 MG tablet       Take twice daily with meals    TAKE 1 TABLET BY MOUTH TWICE DAILY AFTER A MEAL     No follow-ups on file.

## 2020-10-27 ENCOUNTER — TELEPHONE (OUTPATIENT)
Dept: FAMILY MEDICINE | Facility: CLINIC | Age: 80
End: 2020-10-27

## 2020-10-27 NOTE — TELEPHONE ENCOUNTER
----- Message from Sabrina Jiménez sent at 10/26/2020  4:16 PM CDT -----  Regarding: Pt advice  Contact: pt  Pt would like to talk about Lynn. She can be reached at [phone]

## 2020-10-28 VITALS
TEMPERATURE: 98 F | BODY MASS INDEX: 36.55 KG/M2 | DIASTOLIC BLOOD PRESSURE: 88 MMHG | HEART RATE: 83 BPM | SYSTOLIC BLOOD PRESSURE: 136 MMHG | HEIGHT: 62 IN | OXYGEN SATURATION: 97 % | WEIGHT: 198.63 LBS

## 2020-11-11 DIAGNOSIS — G25.81 RESTLESS LEG SYNDROME: ICD-10-CM

## 2020-11-11 NOTE — TELEPHONE ENCOUNTER
No new care gaps identified.  Powered by [employer]. Reference number: [id number]. 11/11/2020 11:33:09 AM   CST

## 2020-11-11 NOTE — TELEPHONE ENCOUNTER
----- Message from Sharon Chacon sent at 11/11/2020 11:27 AM CST -----  Contact: patient  Type:  RX Refill Request    Who Called:  patient  Refill or New Rx:  refill  RX Name and Strength:  nortriptyline (PAMELOR) 25 MG capsule  How is the patient currently taking it? (ex. 1XDay):  1 x day  Is this a 30 day or 90 day RX:  90  Preferred Pharmacy with phone number:    [facility] [address] [address]  Phone: [phone] Fax: [phone]  Local or Mail Order:  local  Ordering Provider:  agustin  Best Call Back Number:  na  Additional Information:  na

## 2020-11-12 RX ORDER — NORTRIPTYLINE HYDROCHLORIDE 25 MG/1
CAPSULE ORAL
Qty: 90 CAPSULE | Refills: 1 | Status: SHIPPED | OUTPATIENT
Start: 2020-11-12 | End: 2021-05-19

## 2020-12-23 LAB
ALBUMIN SERPL-MCNC: 4.2 G/DL (ref 3.7–4.7)
ALBUMIN/GLOB SERPL: 1.7 {RATIO} (ref 1.2–2.2)
ALP SERPL-CCNC: 112 IU/L (ref 39–117)
ALT SERPL-CCNC: 12 IU/L (ref 0–32)
AST SERPL-CCNC: 14 IU/L (ref 0–40)
BILIRUB SERPL-MCNC: 0.3 MG/DL (ref 0–1.2)
BUN SERPL-MCNC: 13 MG/DL (ref 8–27)
BUN/CREAT SERPL: 19 (ref 12–28)
CALCIUM SERPL-MCNC: 9.7 MG/DL (ref 8.7–10.3)
CHLORIDE SERPL-SCNC: 98 MMOL/L (ref 96–106)
CHOLEST SERPL-MCNC: 129 MG/DL (ref 100–199)
CO2 SERPL-SCNC: 23 MMOL/L (ref 20–29)
CREAT SERPL-MCNC: 0.7 MG/DL (ref 0.57–1)
GLOBULIN SER CALC-MCNC: 2.5 G/DL (ref 1.5–4.5)
GLUCOSE SERPL-MCNC: 362 MG/DL (ref 65–99)
HBA1C MFR BLD: 14.4 % (ref 4.8–5.6)
HDLC SERPL-MCNC: 40 MG/DL
LDLC SERPL CALC-MCNC: 65 MG/DL (ref 0–99)
POTASSIUM SERPL-SCNC: 4.5 MMOL/L (ref 3.5–5.2)
PROT SERPL-MCNC: 6.7 G/DL (ref 6–8.5)
SODIUM SERPL-SCNC: 137 MMOL/L (ref 134–144)
TRIGL SERPL-MCNC: 137 MG/DL (ref 0–149)
VLDLC SERPL CALC-MCNC: 24 MG/DL (ref 5–40)

## 2021-01-05 NOTE — PROGRESS NOTES
Unable to get after numerous attempts and cannot leave message. I need her to come in for appt ASAP related to extremely high HGBA1c. I have also put in referral to endocrinology. Continue reaching out.

## 2021-02-22 ENCOUNTER — TELEPHONE (OUTPATIENT)
Dept: FAMILY MEDICINE | Facility: CLINIC | Age: 81
End: 2021-02-22

## 2021-02-22 ENCOUNTER — OFFICE VISIT (OUTPATIENT)
Dept: PRIMARY CARE CLINIC | Facility: CLINIC | Age: 81
End: 2021-02-22
Payer: MEDICARE

## 2021-02-22 VITALS
SYSTOLIC BLOOD PRESSURE: 136 MMHG | HEART RATE: 98 BPM | DIASTOLIC BLOOD PRESSURE: 60 MMHG | WEIGHT: 197.31 LBS | BODY MASS INDEX: 36.31 KG/M2 | OXYGEN SATURATION: 96 % | HEIGHT: 62 IN | TEMPERATURE: 98 F

## 2021-02-22 DIAGNOSIS — I50.9 CHRONIC CONGESTIVE HEART FAILURE, UNSPECIFIED HEART FAILURE TYPE: ICD-10-CM

## 2021-02-22 DIAGNOSIS — J44.9 CHRONIC OBSTRUCTIVE PULMONARY DISEASE, UNSPECIFIED COPD TYPE: ICD-10-CM

## 2021-02-22 DIAGNOSIS — E66.01 MORBID (SEVERE) OBESITY DUE TO EXCESS CALORIES: ICD-10-CM

## 2021-02-22 DIAGNOSIS — I10 ESSENTIAL HYPERTENSION: ICD-10-CM

## 2021-02-22 DIAGNOSIS — E11.65 UNCONTROLLED TYPE 2 DIABETES MELLITUS WITH HYPERGLYCEMIA: ICD-10-CM

## 2021-02-22 PROCEDURE — 3075F PR MOST RECENT SYSTOLIC BLOOD PRESS GE 130-139MM HG: ICD-10-PCS | Mod: CPTII,S$GLB,, | Performed by: NURSE PRACTITIONER

## 2021-02-22 PROCEDURE — 99214 OFFICE O/P EST MOD 30 MIN: CPT | Mod: S$GLB,,, | Performed by: NURSE PRACTITIONER

## 2021-02-22 PROCEDURE — 1159F MED LIST DOCD IN RCRD: CPT | Mod: S$GLB,,, | Performed by: NURSE PRACTITIONER

## 2021-02-22 PROCEDURE — 3075F SYST BP GE 130 - 139MM HG: CPT | Mod: CPTII,S$GLB,, | Performed by: NURSE PRACTITIONER

## 2021-02-22 PROCEDURE — 1126F PR PAIN SEVERITY QUANTIFIED, NO PAIN PRESENT: ICD-10-PCS | Mod: S$GLB,,, | Performed by: NURSE PRACTITIONER

## 2021-02-22 PROCEDURE — 3078F DIAST BP <80 MM HG: CPT | Mod: CPTII,S$GLB,, | Performed by: NURSE PRACTITIONER

## 2021-02-22 PROCEDURE — 1126F AMNT PAIN NOTED NONE PRSNT: CPT | Mod: S$GLB,,, | Performed by: NURSE PRACTITIONER

## 2021-02-22 PROCEDURE — 1159F PR MEDICATION LIST DOCUMENTED IN MEDICAL RECORD: ICD-10-PCS | Mod: S$GLB,,, | Performed by: NURSE PRACTITIONER

## 2021-02-22 PROCEDURE — 99214 PR OFFICE/OUTPT VISIT, EST, LEVL IV, 30-39 MIN: ICD-10-PCS | Mod: S$GLB,,, | Performed by: NURSE PRACTITIONER

## 2021-02-22 PROCEDURE — 3078F PR MOST RECENT DIASTOLIC BLOOD PRESSURE < 80 MM HG: ICD-10-PCS | Mod: CPTII,S$GLB,, | Performed by: NURSE PRACTITIONER

## 2021-02-22 RX ORDER — GLIPIZIDE 10 MG/1
10 TABLET ORAL
Qty: 180 TABLET | Refills: 1 | Status: SHIPPED | OUTPATIENT
Start: 2021-02-22 | End: 2021-04-19 | Stop reason: SDUPTHER

## 2021-02-22 RX ORDER — INSULIN GLARGINE 100 [IU]/ML
10 INJECTION, SOLUTION SUBCUTANEOUS DAILY
Qty: 1 BOX | Refills: 2 | Status: SHIPPED | OUTPATIENT
Start: 2021-02-22 | End: 2021-03-22 | Stop reason: SDUPTHER

## 2021-02-22 RX ORDER — BLOOD SUGAR DIAGNOSTIC
1 STRIP MISCELLANEOUS DAILY
Qty: 100 EACH | Refills: 3 | Status: SHIPPED | OUTPATIENT
Start: 2021-02-22 | End: 2021-05-23

## 2021-02-22 RX ORDER — ATORVASTATIN CALCIUM 20 MG/1
20 TABLET, FILM COATED ORAL DAILY
Qty: 90 TABLET | Refills: 1 | Status: SHIPPED | OUTPATIENT
Start: 2021-02-22 | End: 2021-04-19 | Stop reason: SDUPTHER

## 2021-02-22 RX ORDER — LOSARTAN POTASSIUM 50 MG/1
50 TABLET ORAL DAILY
Qty: 90 TABLET | Refills: 1 | Status: SHIPPED | OUTPATIENT
Start: 2021-02-22 | End: 2021-04-19 | Stop reason: SDUPTHER

## 2021-02-22 RX ORDER — METFORMIN HYDROCHLORIDE 1000 MG/1
TABLET ORAL
Qty: 180 TABLET | Refills: 1 | Status: SHIPPED | OUTPATIENT
Start: 2021-02-22 | End: 2021-04-19 | Stop reason: SDUPTHER

## 2021-02-23 ENCOUNTER — TELEPHONE (OUTPATIENT)
Dept: FAMILY MEDICINE | Facility: CLINIC | Age: 81
End: 2021-02-23

## 2021-03-22 ENCOUNTER — OFFICE VISIT (OUTPATIENT)
Dept: PRIMARY CARE CLINIC | Facility: CLINIC | Age: 81
End: 2021-03-22
Payer: MEDICARE

## 2021-03-22 VITALS
HEART RATE: 99 BPM | BODY MASS INDEX: 36.63 KG/M2 | TEMPERATURE: 98 F | WEIGHT: 199.06 LBS | DIASTOLIC BLOOD PRESSURE: 60 MMHG | HEIGHT: 62 IN | SYSTOLIC BLOOD PRESSURE: 148 MMHG | OXYGEN SATURATION: 96 %

## 2021-03-22 DIAGNOSIS — F33.0 MAJOR DEPRESSIVE DISORDER, RECURRENT, MILD: ICD-10-CM

## 2021-03-22 DIAGNOSIS — E78.2 MIXED HYPERLIPIDEMIA: ICD-10-CM

## 2021-03-22 DIAGNOSIS — E11.65 UNCONTROLLED TYPE 2 DIABETES MELLITUS WITH HYPERGLYCEMIA: ICD-10-CM

## 2021-03-22 DIAGNOSIS — I10 ESSENTIAL HYPERTENSION: ICD-10-CM

## 2021-03-22 DIAGNOSIS — Z91.148 POOR COMPLIANCE WITH MEDICATION: ICD-10-CM

## 2021-03-22 DIAGNOSIS — D75.839 THROMBOCYTOSIS: ICD-10-CM

## 2021-03-22 PROCEDURE — 3077F SYST BP >= 140 MM HG: CPT | Mod: CPTII,S$GLB,, | Performed by: NURSE PRACTITIONER

## 2021-03-22 PROCEDURE — 3078F DIAST BP <80 MM HG: CPT | Mod: CPTII,S$GLB,, | Performed by: NURSE PRACTITIONER

## 2021-03-22 PROCEDURE — 1126F PR PAIN SEVERITY QUANTIFIED, NO PAIN PRESENT: ICD-10-PCS | Mod: S$GLB,,, | Performed by: NURSE PRACTITIONER

## 2021-03-22 PROCEDURE — 1159F MED LIST DOCD IN RCRD: CPT | Mod: S$GLB,,, | Performed by: NURSE PRACTITIONER

## 2021-03-22 PROCEDURE — 3078F PR MOST RECENT DIASTOLIC BLOOD PRESSURE < 80 MM HG: ICD-10-PCS | Mod: CPTII,S$GLB,, | Performed by: NURSE PRACTITIONER

## 2021-03-22 PROCEDURE — 99214 PR OFFICE/OUTPT VISIT, EST, LEVL IV, 30-39 MIN: ICD-10-PCS | Mod: S$GLB,,, | Performed by: NURSE PRACTITIONER

## 2021-03-22 PROCEDURE — 1126F AMNT PAIN NOTED NONE PRSNT: CPT | Mod: S$GLB,,, | Performed by: NURSE PRACTITIONER

## 2021-03-22 PROCEDURE — 99214 OFFICE O/P EST MOD 30 MIN: CPT | Mod: S$GLB,,, | Performed by: NURSE PRACTITIONER

## 2021-03-22 PROCEDURE — 1159F PR MEDICATION LIST DOCUMENTED IN MEDICAL RECORD: ICD-10-PCS | Mod: S$GLB,,, | Performed by: NURSE PRACTITIONER

## 2021-03-22 PROCEDURE — 3077F PR MOST RECENT SYSTOLIC BLOOD PRESSURE >= 140 MM HG: ICD-10-PCS | Mod: CPTII,S$GLB,, | Performed by: NURSE PRACTITIONER

## 2021-03-22 RX ORDER — INSULIN GLARGINE 100 [IU]/ML
20 INJECTION, SOLUTION SUBCUTANEOUS DAILY
Qty: 15 ML | Refills: 2 | Status: SHIPPED | OUTPATIENT
Start: 2021-03-22 | End: 2021-04-29

## 2021-03-24 ENCOUNTER — PATIENT OUTREACH (OUTPATIENT)
Dept: ADMINISTRATIVE | Facility: OTHER | Age: 81
End: 2021-03-24

## 2021-04-19 ENCOUNTER — OFFICE VISIT (OUTPATIENT)
Dept: PRIMARY CARE CLINIC | Facility: CLINIC | Age: 81
End: 2021-04-19
Payer: MEDICARE

## 2021-04-19 ENCOUNTER — TELEPHONE (OUTPATIENT)
Dept: FAMILY MEDICINE | Facility: CLINIC | Age: 81
End: 2021-04-19

## 2021-04-19 VITALS
WEIGHT: 199.06 LBS | HEART RATE: 75 BPM | SYSTOLIC BLOOD PRESSURE: 130 MMHG | BODY MASS INDEX: 36.63 KG/M2 | HEIGHT: 62 IN | OXYGEN SATURATION: 95 % | TEMPERATURE: 98 F | DIASTOLIC BLOOD PRESSURE: 70 MMHG

## 2021-04-19 DIAGNOSIS — I50.9 CHRONIC CONGESTIVE HEART FAILURE, UNSPECIFIED HEART FAILURE TYPE: ICD-10-CM

## 2021-04-19 DIAGNOSIS — R19.8 UMBILICUS DISCHARGE: ICD-10-CM

## 2021-04-19 DIAGNOSIS — E78.2 MIXED HYPERLIPIDEMIA: ICD-10-CM

## 2021-04-19 DIAGNOSIS — E11.65 UNCONTROLLED TYPE 2 DIABETES MELLITUS WITH HYPERGLYCEMIA: ICD-10-CM

## 2021-04-19 DIAGNOSIS — I10 ESSENTIAL HYPERTENSION: ICD-10-CM

## 2021-04-19 DIAGNOSIS — E66.01 MORBID (SEVERE) OBESITY DUE TO EXCESS CALORIES: ICD-10-CM

## 2021-04-19 PROCEDURE — 1126F PR PAIN SEVERITY QUANTIFIED, NO PAIN PRESENT: ICD-10-PCS | Mod: S$GLB,,, | Performed by: NURSE PRACTITIONER

## 2021-04-19 PROCEDURE — 99214 OFFICE O/P EST MOD 30 MIN: CPT | Mod: S$GLB,,, | Performed by: NURSE PRACTITIONER

## 2021-04-19 PROCEDURE — 99214 PR OFFICE/OUTPT VISIT, EST, LEVL IV, 30-39 MIN: ICD-10-PCS | Mod: S$GLB,,, | Performed by: NURSE PRACTITIONER

## 2021-04-19 PROCEDURE — 1159F MED LIST DOCD IN RCRD: CPT | Mod: S$GLB,,, | Performed by: NURSE PRACTITIONER

## 2021-04-19 PROCEDURE — 1159F PR MEDICATION LIST DOCUMENTED IN MEDICAL RECORD: ICD-10-PCS | Mod: S$GLB,,, | Performed by: NURSE PRACTITIONER

## 2021-04-19 PROCEDURE — 1126F AMNT PAIN NOTED NONE PRSNT: CPT | Mod: S$GLB,,, | Performed by: NURSE PRACTITIONER

## 2021-04-19 RX ORDER — ATORVASTATIN CALCIUM 20 MG/1
20 TABLET, FILM COATED ORAL DAILY
Qty: 90 TABLET | Refills: 1 | Status: SHIPPED | OUTPATIENT
Start: 2021-04-19 | End: 2022-03-28 | Stop reason: SDUPTHER

## 2021-04-19 RX ORDER — LOSARTAN POTASSIUM 50 MG/1
50 TABLET ORAL DAILY
Qty: 90 TABLET | Refills: 1 | Status: SHIPPED | OUTPATIENT
Start: 2021-04-19 | End: 2021-08-09

## 2021-04-19 RX ORDER — METFORMIN HYDROCHLORIDE 1000 MG/1
TABLET ORAL
Qty: 180 TABLET | Refills: 1 | Status: SHIPPED | OUTPATIENT
Start: 2021-04-19 | End: 2022-02-03

## 2021-04-19 RX ORDER — CARVEDILOL 6.25 MG/1
6.25 TABLET ORAL 2 TIMES DAILY
Qty: 180 TABLET | Refills: 1 | Status: SHIPPED | OUTPATIENT
Start: 2021-04-19 | End: 2021-08-09

## 2021-04-19 RX ORDER — MUPIROCIN 20 MG/G
OINTMENT TOPICAL 2 TIMES DAILY
Qty: 30 G | Refills: 1 | Status: SHIPPED | OUTPATIENT
Start: 2021-04-19 | End: 2021-04-29

## 2021-04-19 RX ORDER — GLIPIZIDE 10 MG/1
10 TABLET ORAL
Qty: 180 TABLET | Refills: 1 | Status: SHIPPED | OUTPATIENT
Start: 2021-04-19 | End: 2021-08-09

## 2021-04-22 ENCOUNTER — LAB VISIT (OUTPATIENT)
Dept: LAB | Facility: CLINIC | Age: 81
End: 2021-04-22
Payer: MEDICARE

## 2021-04-22 DIAGNOSIS — I10 ESSENTIAL HYPERTENSION: ICD-10-CM

## 2021-04-22 LAB
ALBUMIN SERPL BCP-MCNC: 3.6 G/DL (ref 3.5–5.2)
ALP SERPL-CCNC: 69 U/L (ref 55–135)
ALT SERPL W/O P-5'-P-CCNC: 13 U/L (ref 10–44)
ANION GAP SERPL CALC-SCNC: 14 MMOL/L (ref 8–16)
AST SERPL-CCNC: 15 U/L (ref 10–40)
BASOPHILS # BLD AUTO: 0.03 K/UL (ref 0–0.2)
BASOPHILS NFR BLD: 0.4 % (ref 0–1.9)
BILIRUB SERPL-MCNC: 0.3 MG/DL (ref 0.1–1)
BUN SERPL-MCNC: 18 MG/DL (ref 8–23)
CALCIUM SERPL-MCNC: 8.6 MG/DL (ref 8.7–10.5)
CHLORIDE SERPL-SCNC: 102 MMOL/L (ref 95–110)
CO2 SERPL-SCNC: 22 MMOL/L (ref 23–29)
CREAT SERPL-MCNC: 0.7 MG/DL (ref 0.5–1.4)
DIFFERENTIAL METHOD: ABNORMAL
EOSINOPHIL # BLD AUTO: 0.1 K/UL (ref 0–0.5)
EOSINOPHIL NFR BLD: 1.7 % (ref 0–8)
ERYTHROCYTE [DISTWIDTH] IN BLOOD BY AUTOMATED COUNT: 18.8 % (ref 11.5–14.5)
EST. GFR  (AFRICAN AMERICAN): >60 ML/MIN/1.73 M^2
EST. GFR  (NON AFRICAN AMERICAN): >60 ML/MIN/1.73 M^2
ESTIMATED AVG GLUCOSE: 315 MG/DL (ref 68–131)
GLUCOSE SERPL-MCNC: 218 MG/DL (ref 70–110)
HBA1C MFR BLD: 12.6 % (ref 4–5.6)
HCT VFR BLD AUTO: 33.7 % (ref 37–48.5)
HGB BLD-MCNC: 10 G/DL (ref 12–16)
IMM GRANULOCYTES # BLD AUTO: 0.02 K/UL (ref 0–0.04)
IMM GRANULOCYTES NFR BLD AUTO: 0.3 % (ref 0–0.5)
LYMPHOCYTES # BLD AUTO: 2.4 K/UL (ref 1–4.8)
LYMPHOCYTES NFR BLD: 32.1 % (ref 18–48)
MCH RBC QN AUTO: 22.2 PG (ref 27–31)
MCHC RBC AUTO-ENTMCNC: 29.7 G/DL (ref 32–36)
MCV RBC AUTO: 75 FL (ref 82–98)
MONOCYTES # BLD AUTO: 0.7 K/UL (ref 0.3–1)
MONOCYTES NFR BLD: 8.8 % (ref 4–15)
NEUTROPHILS # BLD AUTO: 4.3 K/UL (ref 1.8–7.7)
NEUTROPHILS NFR BLD: 56.7 % (ref 38–73)
NRBC BLD-RTO: 0 /100 WBC
PLATELET # BLD AUTO: 402 K/UL (ref 150–450)
PMV BLD AUTO: 10.1 FL (ref 9.2–12.9)
POTASSIUM SERPL-SCNC: 4.6 MMOL/L (ref 3.5–5.1)
PROT SERPL-MCNC: 7 G/DL (ref 6–8.4)
RBC # BLD AUTO: 4.5 M/UL (ref 4–5.4)
SODIUM SERPL-SCNC: 138 MMOL/L (ref 136–145)
T4 FREE SERPL-MCNC: 0.94 NG/DL (ref 0.71–1.51)
TSH SERPL DL<=0.005 MIU/L-ACNC: 4.16 UIU/ML (ref 0.4–4)
WBC # BLD AUTO: 7.54 K/UL (ref 3.9–12.7)

## 2021-04-22 PROCEDURE — 84439 ASSAY OF FREE THYROXINE: CPT | Performed by: NURSE PRACTITIONER

## 2021-04-22 PROCEDURE — 80053 COMPREHEN METABOLIC PANEL: CPT | Performed by: NURSE PRACTITIONER

## 2021-04-22 PROCEDURE — 84443 ASSAY THYROID STIM HORMONE: CPT | Performed by: NURSE PRACTITIONER

## 2021-04-22 PROCEDURE — 85025 COMPLETE CBC W/AUTO DIFF WBC: CPT | Performed by: NURSE PRACTITIONER

## 2021-04-22 PROCEDURE — 36415 PR COLLECTION VENOUS BLOOD,VENIPUNCTURE: ICD-10-PCS | Mod: ,,, | Performed by: NURSE PRACTITIONER

## 2021-04-22 PROCEDURE — 83036 HEMOGLOBIN GLYCOSYLATED A1C: CPT | Performed by: NURSE PRACTITIONER

## 2021-04-22 PROCEDURE — 36415 COLL VENOUS BLD VENIPUNCTURE: CPT | Mod: ,,, | Performed by: NURSE PRACTITIONER

## 2021-04-29 DIAGNOSIS — E11.65 UNCONTROLLED TYPE 2 DIABETES MELLITUS WITH HYPERGLYCEMIA: ICD-10-CM

## 2021-04-29 DIAGNOSIS — Z91.148 POOR COMPLIANCE WITH MEDICATION: ICD-10-CM

## 2021-04-29 RX ORDER — INSULIN GLARGINE 100 [IU]/ML
25 INJECTION, SOLUTION SUBCUTANEOUS DAILY
Qty: 15 ML | Refills: 2 | Status: SHIPPED | OUTPATIENT
Start: 2021-04-29 | End: 2022-03-28 | Stop reason: SDUPTHER

## 2021-05-24 ENCOUNTER — OFFICE VISIT (OUTPATIENT)
Dept: PRIMARY CARE CLINIC | Facility: CLINIC | Age: 81
End: 2021-05-24
Payer: MEDICARE

## 2021-05-24 VITALS
OXYGEN SATURATION: 97 % | HEART RATE: 85 BPM | BODY MASS INDEX: 37.04 KG/M2 | WEIGHT: 201.25 LBS | SYSTOLIC BLOOD PRESSURE: 150 MMHG | DIASTOLIC BLOOD PRESSURE: 80 MMHG | TEMPERATURE: 98 F | HEIGHT: 62 IN

## 2021-05-24 DIAGNOSIS — I10 ESSENTIAL HYPERTENSION: Primary | ICD-10-CM

## 2021-05-24 DIAGNOSIS — J44.9 CHRONIC OBSTRUCTIVE PULMONARY DISEASE, UNSPECIFIED COPD TYPE: ICD-10-CM

## 2021-05-24 DIAGNOSIS — E66.01 MORBID (SEVERE) OBESITY DUE TO EXCESS CALORIES: ICD-10-CM

## 2021-05-24 DIAGNOSIS — E78.2 MIXED HYPERLIPIDEMIA: ICD-10-CM

## 2021-05-24 DIAGNOSIS — G47.33 OSA ON CPAP: ICD-10-CM

## 2021-05-24 PROCEDURE — 99214 PR OFFICE/OUTPT VISIT, EST, LEVL IV, 30-39 MIN: ICD-10-PCS | Mod: S$GLB,,, | Performed by: NURSE PRACTITIONER

## 2021-05-24 PROCEDURE — 1159F PR MEDICATION LIST DOCUMENTED IN MEDICAL RECORD: ICD-10-PCS | Mod: S$GLB,,, | Performed by: NURSE PRACTITIONER

## 2021-05-24 PROCEDURE — 1159F MED LIST DOCD IN RCRD: CPT | Mod: S$GLB,,, | Performed by: NURSE PRACTITIONER

## 2021-05-24 PROCEDURE — 1126F AMNT PAIN NOTED NONE PRSNT: CPT | Mod: S$GLB,,, | Performed by: NURSE PRACTITIONER

## 2021-05-24 PROCEDURE — 1126F PR PAIN SEVERITY QUANTIFIED, NO PAIN PRESENT: ICD-10-PCS | Mod: S$GLB,,, | Performed by: NURSE PRACTITIONER

## 2021-05-24 PROCEDURE — 99214 OFFICE O/P EST MOD 30 MIN: CPT | Mod: S$GLB,,, | Performed by: NURSE PRACTITIONER

## 2021-05-24 RX ORDER — FLUTICASONE PROPIONATE AND SALMETEROL 250; 50 UG/1; UG/1
1 POWDER RESPIRATORY (INHALATION) 2 TIMES DAILY
Qty: 180 EACH | Refills: 3 | Status: SHIPPED | OUTPATIENT
Start: 2021-05-24

## 2021-06-10 ENCOUNTER — LAB VISIT (OUTPATIENT)
Dept: LAB | Facility: CLINIC | Age: 81
End: 2021-06-10
Payer: MEDICARE

## 2021-06-10 DIAGNOSIS — I10 ESSENTIAL HYPERTENSION: ICD-10-CM

## 2021-06-10 LAB
ALBUMIN SERPL BCP-MCNC: 3.7 G/DL (ref 3.5–5.2)
ALP SERPL-CCNC: 78 U/L (ref 55–135)
ALT SERPL W/O P-5'-P-CCNC: 15 U/L (ref 10–44)
ANION GAP SERPL CALC-SCNC: 12 MMOL/L (ref 8–16)
AST SERPL-CCNC: 15 U/L (ref 10–40)
BILIRUB SERPL-MCNC: 0.4 MG/DL (ref 0.1–1)
BUN SERPL-MCNC: 21 MG/DL (ref 8–23)
CALCIUM SERPL-MCNC: 9.7 MG/DL (ref 8.7–10.5)
CHLORIDE SERPL-SCNC: 104 MMOL/L (ref 95–110)
CO2 SERPL-SCNC: 22 MMOL/L (ref 23–29)
CREAT SERPL-MCNC: 0.7 MG/DL (ref 0.5–1.4)
EST. GFR  (AFRICAN AMERICAN): >60 ML/MIN/1.73 M^2
EST. GFR  (NON AFRICAN AMERICAN): >60 ML/MIN/1.73 M^2
ESTIMATED AVG GLUCOSE: 252 MG/DL (ref 68–131)
GLUCOSE SERPL-MCNC: 206 MG/DL (ref 70–110)
HBA1C MFR BLD: 10.4 % (ref 4–5.6)
POTASSIUM SERPL-SCNC: 4.2 MMOL/L (ref 3.5–5.1)
PROT SERPL-MCNC: 6.9 G/DL (ref 6–8.4)
SODIUM SERPL-SCNC: 138 MMOL/L (ref 136–145)

## 2021-06-10 PROCEDURE — 36415 COLL VENOUS BLD VENIPUNCTURE: CPT | Mod: ,,, | Performed by: NURSE PRACTITIONER

## 2021-06-10 PROCEDURE — 83036 HEMOGLOBIN GLYCOSYLATED A1C: CPT | Performed by: NURSE PRACTITIONER

## 2021-06-10 PROCEDURE — 36415 PR COLLECTION VENOUS BLOOD,VENIPUNCTURE: ICD-10-PCS | Mod: ,,, | Performed by: NURSE PRACTITIONER

## 2021-06-10 PROCEDURE — 80053 COMPREHEN METABOLIC PANEL: CPT | Performed by: NURSE PRACTITIONER

## 2021-06-14 ENCOUNTER — OFFICE VISIT (OUTPATIENT)
Dept: ENDOCRINOLOGY | Facility: CLINIC | Age: 81
End: 2021-06-14
Payer: MEDICARE

## 2021-06-14 ENCOUNTER — PATIENT OUTREACH (OUTPATIENT)
Dept: ADMINISTRATIVE | Facility: OTHER | Age: 81
End: 2021-06-14

## 2021-06-14 ENCOUNTER — OFFICE VISIT (OUTPATIENT)
Dept: FAMILY MEDICINE | Facility: CLINIC | Age: 81
End: 2021-06-14
Payer: MEDICARE

## 2021-06-14 VITALS
HEIGHT: 62 IN | RESPIRATION RATE: 18 BRPM | DIASTOLIC BLOOD PRESSURE: 64 MMHG | BODY MASS INDEX: 37.81 KG/M2 | SYSTOLIC BLOOD PRESSURE: 138 MMHG | TEMPERATURE: 98 F | WEIGHT: 205.5 LBS | OXYGEN SATURATION: 98 %

## 2021-06-14 DIAGNOSIS — I10 ESSENTIAL HYPERTENSION: Primary | ICD-10-CM

## 2021-06-14 DIAGNOSIS — I25.10 CORONARY ARTERY DISEASE, ANGINA PRESENCE UNSPECIFIED, UNSPECIFIED VESSEL OR LESION TYPE, UNSPECIFIED WHETHER NATIVE OR TRANSPLANTED HEART: ICD-10-CM

## 2021-06-14 DIAGNOSIS — E11.65 UNCONTROLLED TYPE 2 DIABETES MELLITUS WITH HYPERGLYCEMIA: ICD-10-CM

## 2021-06-14 DIAGNOSIS — E78.2 MIXED HYPERLIPIDEMIA: ICD-10-CM

## 2021-06-14 DIAGNOSIS — I50.9 CHRONIC CONGESTIVE HEART FAILURE, UNSPECIFIED HEART FAILURE TYPE: ICD-10-CM

## 2021-06-14 DIAGNOSIS — R94.6 ABNORMAL THYROID FUNCTION TEST: ICD-10-CM

## 2021-06-14 DIAGNOSIS — D50.8 OTHER IRON DEFICIENCY ANEMIA: ICD-10-CM

## 2021-06-14 DIAGNOSIS — R60.0 LOCALIZED EDEMA: ICD-10-CM

## 2021-06-14 DIAGNOSIS — E78.49 OTHER HYPERLIPIDEMIA: ICD-10-CM

## 2021-06-14 PROCEDURE — 3078F PR MOST RECENT DIASTOLIC BLOOD PRESSURE < 80 MM HG: ICD-10-PCS | Mod: CPTII,S$GLB,, | Performed by: FAMILY MEDICINE

## 2021-06-14 PROCEDURE — 1159F MED LIST DOCD IN RCRD: CPT | Mod: S$GLB,,, | Performed by: FAMILY MEDICINE

## 2021-06-14 PROCEDURE — 99499 UNLISTED E&M SERVICE: CPT | Mod: S$GLB,,, | Performed by: NURSE PRACTITIONER

## 2021-06-14 PROCEDURE — 99999 PR PBB SHADOW E&M-EST. PATIENT-LVL IV: ICD-10-PCS | Mod: PBBFAC,,, | Performed by: FAMILY MEDICINE

## 2021-06-14 PROCEDURE — 3078F DIAST BP <80 MM HG: CPT | Mod: CPTII,S$GLB,, | Performed by: FAMILY MEDICINE

## 2021-06-14 PROCEDURE — 3075F SYST BP GE 130 - 139MM HG: CPT | Mod: CPTII,S$GLB,, | Performed by: FAMILY MEDICINE

## 2021-06-14 PROCEDURE — 99499 UNLISTED E&M SERVICE: CPT | Mod: S$GLB,,, | Performed by: FAMILY MEDICINE

## 2021-06-14 PROCEDURE — 99999 PR PBB SHADOW E&M-EST. PATIENT-LVL IV: CPT | Mod: PBBFAC,,, | Performed by: FAMILY MEDICINE

## 2021-06-14 PROCEDURE — 99499 NO LOS: ICD-10-PCS | Mod: S$GLB,,, | Performed by: NURSE PRACTITIONER

## 2021-06-14 PROCEDURE — 1159F PR MEDICATION LIST DOCUMENTED IN MEDICAL RECORD: ICD-10-PCS | Mod: S$GLB,,, | Performed by: FAMILY MEDICINE

## 2021-06-14 PROCEDURE — 99499 RISK ADDL DX/OHS AUDIT: ICD-10-PCS | Mod: S$GLB,,, | Performed by: FAMILY MEDICINE

## 2021-06-14 PROCEDURE — 99214 OFFICE O/P EST MOD 30 MIN: CPT | Mod: S$GLB,,, | Performed by: FAMILY MEDICINE

## 2021-06-14 PROCEDURE — 3075F PR MOST RECENT SYSTOLIC BLOOD PRESS GE 130-139MM HG: ICD-10-PCS | Mod: CPTII,S$GLB,, | Performed by: FAMILY MEDICINE

## 2021-06-14 PROCEDURE — 99214 PR OFFICE/OUTPT VISIT, EST, LEVL IV, 30-39 MIN: ICD-10-PCS | Mod: S$GLB,,, | Performed by: FAMILY MEDICINE

## 2021-06-14 PROCEDURE — 1126F PR PAIN SEVERITY QUANTIFIED, NO PAIN PRESENT: ICD-10-PCS | Mod: S$GLB,,, | Performed by: FAMILY MEDICINE

## 2021-06-14 PROCEDURE — 1126F AMNT PAIN NOTED NONE PRSNT: CPT | Mod: S$GLB,,, | Performed by: FAMILY MEDICINE

## 2021-06-14 RX ORDER — OFLOXACIN 3 MG/ML
1 SOLUTION/ DROPS OPHTHALMIC 4 TIMES DAILY
COMMUNITY
Start: 2021-05-15

## 2021-06-14 RX ORDER — PREDNISOLONE ACETATE 10 MG/ML
1 SUSPENSION/ DROPS OPHTHALMIC 4 TIMES DAILY
COMMUNITY
Start: 2021-05-15

## 2021-06-14 RX ORDER — DULAGLUTIDE 0.75 MG/.5ML
0.75 INJECTION, SOLUTION SUBCUTANEOUS
Qty: 4 PEN | Refills: 2 | Status: SHIPPED | OUTPATIENT
Start: 2021-06-14 | End: 2021-09-12

## 2022-02-02 DIAGNOSIS — I50.9 CHRONIC CONGESTIVE HEART FAILURE, UNSPECIFIED HEART FAILURE TYPE: ICD-10-CM

## 2022-02-02 DIAGNOSIS — E11.65 UNCONTROLLED TYPE 2 DIABETES MELLITUS WITH HYPERGLYCEMIA: ICD-10-CM

## 2022-02-02 DIAGNOSIS — I10 ESSENTIAL HYPERTENSION: ICD-10-CM

## 2022-02-03 RX ORDER — CARVEDILOL 6.25 MG/1
TABLET ORAL
Qty: 60 TABLET | Refills: 0 | Status: SHIPPED | OUTPATIENT
Start: 2022-02-03 | End: 2022-03-28

## 2022-02-03 RX ORDER — METFORMIN HYDROCHLORIDE 1000 MG/1
TABLET ORAL
Qty: 60 TABLET | Refills: 0 | Status: SHIPPED | OUTPATIENT
Start: 2022-02-03 | End: 2022-03-28 | Stop reason: SDUPTHER

## 2022-02-03 RX ORDER — LOSARTAN POTASSIUM 50 MG/1
TABLET ORAL
Qty: 30 TABLET | Refills: 0 | Status: SHIPPED | OUTPATIENT
Start: 2022-02-03 | End: 2022-03-28 | Stop reason: SDUPTHER

## 2022-02-03 NOTE — TELEPHONE ENCOUNTER
Please call to make follow up with Neil or myself. She needs a follow up. No labs in 6 months. I have sent one month refill of her meds until she come in for appt.

## 2022-03-11 DIAGNOSIS — I50.9 CHRONIC CONGESTIVE HEART FAILURE, UNSPECIFIED HEART FAILURE TYPE: ICD-10-CM

## 2022-03-11 DIAGNOSIS — E11.65 UNCONTROLLED TYPE 2 DIABETES MELLITUS WITH HYPERGLYCEMIA: ICD-10-CM

## 2022-03-11 DIAGNOSIS — I10 ESSENTIAL HYPERTENSION: ICD-10-CM

## 2022-03-11 RX ORDER — CARVEDILOL 6.25 MG/1
6.25 TABLET ORAL 2 TIMES DAILY
Qty: 60 TABLET | Refills: 0 | Status: CANCELLED | OUTPATIENT
Start: 2022-03-11

## 2022-03-11 RX ORDER — METFORMIN HYDROCHLORIDE 1000 MG/1
TABLET ORAL
Qty: 60 TABLET | Refills: 0 | Status: CANCELLED | OUTPATIENT
Start: 2022-03-11

## 2022-03-11 NOTE — TELEPHONE ENCOUNTER
No new care gaps identified.  Powered by [employer].. Reference number: [id number].   3/11/2022 1:35:15 PM CST

## 2022-03-11 NOTE — TELEPHONE ENCOUNTER
----- Message from Saida Treviño sent at 3/11/2022  1:27 PM CST -----  .Type: RX Refill Request    Who Called: self     Have you contacted your pharmacy: yes     Refill or New Rx: refill     RX Name and Strength: metFORMIN (GLUCOPHAGE) 1000 MG tablet    How is the patient currently taking it? (ex. 1XDay):  2x a day     Is this a 30 day or 90 day RX:    Preferred Pharmacy with phone number:   [facility] include 234 beds at the [facility] [address] [address]  Phone: [phone] Fax: [phone]      Local or Mail Order: Local     Ordering Provider: Dr. Porter    Would the patient rather a call back or a response via [facility]? Call back     Best Call Back Number: [phone]    Thank you

## 2022-03-11 NOTE — TELEPHONE ENCOUNTER
----- Message from Stephy Fuller sent at 3/11/2022  1:41 PM CST -----  Who Called: DHARMESH SALGADO     Refill or New Rx: refill     RX Name and Strength: metFORMIN (GLUCOPHAGE) 1000 MG tablet, carvediloL (COREG) 6.25 MG tablet    Is this a 30 day or 90 day RX: 90 day     Preferred Pharmacy with phone number:[address]    Local or Mail Order: Local     Ordering Provider: Ashlee Porter    Best Call Back Number: [phone]    Additional Information:

## 2022-03-25 ENCOUNTER — TELEPHONE (OUTPATIENT)
Dept: FAMILY MEDICINE | Facility: CLINIC | Age: 82
End: 2022-03-25
Payer: MEDICARE

## 2022-03-25 NOTE — TELEPHONE ENCOUNTER
----- Message from Wade Causey sent at 3/25/2022 11:21 AM CDT -----  Type:  RX Refill Request    Who Called:  Pt  Refill or New Rx:  refill  RX Name and Strength:  metFORMIN (GLUCOPHAGE) 1000 MG tablet  carvediloL (COREG) 6.25 MG tablet  glipiZIDE (GLUCOTROL) 10 MG tablet  omeprazole (PRILOSEC) 40 MG capsule  atorvastatin (LIPITOR) 20 MG tablet    How is the patient currently taking it? (ex. 1XDay):  as directed  Is this a 30 day or 90 day RX:  30  Preferred Pharmacy with phone number:    [facility] [address] [place], [place] - [address] [place] [address]  Phone: [phone] Fax: [phone]      Local or Mail Order:  Local  Ordering Provider:  German Ramos Call Back Number:  [phone]    Additional Information:  Pt sts she is out of her meds and has called several times about them .  She wants a call back asap  Please advise -- Thank you

## 2022-03-25 NOTE — TELEPHONE ENCOUNTER
Spoke with patient and scheduled appointment with Leatha Ashlee in [place] for 3/28  Informed patient that medication will be refilled at appointment

## 2022-03-28 ENCOUNTER — OFFICE VISIT (OUTPATIENT)
Dept: PRIMARY CARE CLINIC | Facility: CLINIC | Age: 82
End: 2022-03-28
Payer: MEDICARE

## 2022-03-28 VITALS
TEMPERATURE: 98 F | OXYGEN SATURATION: 98 % | BODY MASS INDEX: 35.08 KG/M2 | RESPIRATION RATE: 18 BRPM | HEART RATE: 79 BPM | DIASTOLIC BLOOD PRESSURE: 78 MMHG | SYSTOLIC BLOOD PRESSURE: 160 MMHG | WEIGHT: 191.81 LBS

## 2022-03-28 DIAGNOSIS — I10 ESSENTIAL HYPERTENSION: ICD-10-CM

## 2022-03-28 DIAGNOSIS — E11.65 UNCONTROLLED TYPE 2 DIABETES MELLITUS WITH HYPERGLYCEMIA: ICD-10-CM

## 2022-03-28 DIAGNOSIS — F33.0 MAJOR DEPRESSIVE DISORDER, RECURRENT, MILD: ICD-10-CM

## 2022-03-28 DIAGNOSIS — D47.3 ESSENTIAL (HEMORRHAGIC) THROMBOCYTHEMIA: ICD-10-CM

## 2022-03-28 DIAGNOSIS — Z91.148 POOR COMPLIANCE WITH MEDICATION: ICD-10-CM

## 2022-03-28 DIAGNOSIS — I50.9 CHRONIC CONGESTIVE HEART FAILURE, UNSPECIFIED HEART FAILURE TYPE: ICD-10-CM

## 2022-03-28 DIAGNOSIS — J44.9 CHRONIC OBSTRUCTIVE PULMONARY DISEASE, UNSPECIFIED COPD TYPE: ICD-10-CM

## 2022-03-28 DIAGNOSIS — E66.01 MORBID (SEVERE) OBESITY DUE TO EXCESS CALORIES: ICD-10-CM

## 2022-03-28 PROCEDURE — 1160F RVW MEDS BY RX/DR IN RCRD: CPT | Mod: CPTII,S$GLB,, | Performed by: NURSE PRACTITIONER

## 2022-03-28 PROCEDURE — 1125F AMNT PAIN NOTED PAIN PRSNT: CPT | Mod: CPTII,S$GLB,, | Performed by: NURSE PRACTITIONER

## 2022-03-28 PROCEDURE — 3288F PR FALLS RISK ASSESSMENT DOCUMENTED: ICD-10-PCS | Mod: CPTII,S$GLB,, | Performed by: NURSE PRACTITIONER

## 2022-03-28 PROCEDURE — 3077F SYST BP >= 140 MM HG: CPT | Mod: CPTII,S$GLB,, | Performed by: NURSE PRACTITIONER

## 2022-03-28 PROCEDURE — 3078F PR MOST RECENT DIASTOLIC BLOOD PRESSURE < 80 MM HG: ICD-10-PCS | Mod: CPTII,S$GLB,, | Performed by: NURSE PRACTITIONER

## 2022-03-28 PROCEDURE — 3078F DIAST BP <80 MM HG: CPT | Mod: CPTII,S$GLB,, | Performed by: NURSE PRACTITIONER

## 2022-03-28 PROCEDURE — 1101F PR PT FALLS ASSESS DOC 0-1 FALLS W/OUT INJ PAST YR: ICD-10-PCS | Mod: CPTII,S$GLB,, | Performed by: NURSE PRACTITIONER

## 2022-03-28 PROCEDURE — 1125F PR PAIN SEVERITY QUANTIFIED, PAIN PRESENT: ICD-10-PCS | Mod: CPTII,S$GLB,, | Performed by: NURSE PRACTITIONER

## 2022-03-28 PROCEDURE — 1159F PR MEDICATION LIST DOCUMENTED IN MEDICAL RECORD: ICD-10-PCS | Mod: CPTII,S$GLB,, | Performed by: NURSE PRACTITIONER

## 2022-03-28 PROCEDURE — 1160F PR REVIEW ALL MEDS BY PRESCRIBER/CLIN PHARMACIST DOCUMENTED: ICD-10-PCS | Mod: CPTII,S$GLB,, | Performed by: NURSE PRACTITIONER

## 2022-03-28 PROCEDURE — 3077F PR MOST RECENT SYSTOLIC BLOOD PRESSURE >= 140 MM HG: ICD-10-PCS | Mod: CPTII,S$GLB,, | Performed by: NURSE PRACTITIONER

## 2022-03-28 PROCEDURE — 1159F MED LIST DOCD IN RCRD: CPT | Mod: CPTII,S$GLB,, | Performed by: NURSE PRACTITIONER

## 2022-03-28 PROCEDURE — 99214 OFFICE O/P EST MOD 30 MIN: CPT | Mod: S$GLB,,, | Performed by: NURSE PRACTITIONER

## 2022-03-28 PROCEDURE — 3288F FALL RISK ASSESSMENT DOCD: CPT | Mod: CPTII,S$GLB,, | Performed by: NURSE PRACTITIONER

## 2022-03-28 PROCEDURE — 1101F PT FALLS ASSESS-DOCD LE1/YR: CPT | Mod: CPTII,S$GLB,, | Performed by: NURSE PRACTITIONER

## 2022-03-28 PROCEDURE — 99214 PR OFFICE/OUTPT VISIT, EST, LEVL IV, 30-39 MIN: ICD-10-PCS | Mod: S$GLB,,, | Performed by: NURSE PRACTITIONER

## 2022-03-28 RX ORDER — LOSARTAN POTASSIUM 50 MG/1
50 TABLET ORAL DAILY
Qty: 90 TABLET | Refills: 1 | Status: SHIPPED | OUTPATIENT
Start: 2022-03-28 | End: 2022-10-27 | Stop reason: SDUPTHER

## 2022-03-28 RX ORDER — ATORVASTATIN CALCIUM 20 MG/1
20 TABLET, FILM COATED ORAL DAILY
Qty: 90 TABLET | Refills: 1 | Status: SHIPPED | OUTPATIENT
Start: 2022-03-28 | End: 2022-10-27 | Stop reason: SDUPTHER

## 2022-03-28 RX ORDER — FUROSEMIDE 40 MG/1
40 TABLET ORAL 2 TIMES DAILY
COMMUNITY

## 2022-03-28 RX ORDER — GLIPIZIDE 10 MG/1
10 TABLET ORAL
Qty: 180 TABLET | Refills: 1 | Status: SHIPPED | OUTPATIENT
Start: 2022-03-28 | End: 2023-02-06 | Stop reason: SDUPTHER

## 2022-03-28 RX ORDER — INSULIN GLARGINE 100 [IU]/ML
25 INJECTION, SOLUTION SUBCUTANEOUS DAILY
Qty: 15 ML | Refills: 2 | Status: SHIPPED | OUTPATIENT
Start: 2022-03-28 | End: 2022-03-30

## 2022-03-28 RX ORDER — METFORMIN HYDROCHLORIDE 1000 MG/1
TABLET ORAL
Qty: 180 TABLET | Refills: 1 | Status: SHIPPED | OUTPATIENT
Start: 2022-03-28 | End: 2022-11-02 | Stop reason: SDUPTHER

## 2022-03-28 RX ORDER — POTASSIUM CHLORIDE 20 MEQ/1
20 TABLET, EXTENDED RELEASE ORAL DAILY
Qty: 90 TABLET | Refills: 1 | Status: SHIPPED | OUTPATIENT
Start: 2022-03-28

## 2022-03-28 RX ORDER — METFORMIN HYDROCHLORIDE 1000 MG/1
TABLET ORAL
Qty: 180 TABLET | Refills: 1 | Status: SHIPPED | OUTPATIENT
Start: 2022-03-28 | End: 2022-03-28 | Stop reason: SDUPTHER

## 2022-03-28 NOTE — PROGRESS NOTES
Subjective:       Patient ID: Latoya Helton is a 81 y.o. female.    Chief Complaint: Medication Refill  Patient was last seen by me on 05/24/2021.  Last seen by PCP; Neil on 06/14/2021.  HPI   She is here to follow up with chronic medical diagnosis and need medications refilled.  Vitals:    03/28/22 1518   BP: (!) 160/78   Pulse:    Resp:    Temp:      BP Readings from Last 3 Encounters:   03/28/22 (!) 160/78   06/14/21 138/64   05/24/21 (!) 150/80     Review of Systems    Lab Results   Component Value Date    HGBA1C 10.4 (H) 06/10/2021     She states cannot afford her Lantus right now and has not in past 2-3 months.  She states her home  or lower  Objective:      Physical Exam  Vitals and nursing note reviewed.   Constitutional:       General: She is awake.      Appearance: Normal appearance. She is well-developed and well-groomed.   HENT:      Head: Normocephalic and atraumatic.      Right Ear: Hearing, tympanic membrane, ear canal and external ear normal.      Left Ear: Hearing, tympanic membrane, ear canal and external ear normal.   Eyes:      General: Lids are normal.         Right eye: No foreign body or discharge.         Left eye: No foreign body or discharge.   Cardiovascular:      Rate and Rhythm: Normal rate and regular rhythm.      Heart sounds: Normal heart sounds.   Pulmonary:      Effort: Pulmonary effort is normal.      Breath sounds: Normal breath sounds and air entry.   Musculoskeletal:      Right hand: Deformity present.      Left hand: Normal.        Arms:       Cervical back: Full passive range of motion without pain, normal range of motion and neck supple.      Right lower leg: No edema.      Left lower leg: No edema.      Comments: Trigger finger to right middle finger   Lymphadenopathy:      Head:      Right side of head: No submental, submandibular, tonsillar, preauricular, posterior auricular or occipital adenopathy.      Left side of head: No submental, submandibular, tonsillar,  "preauricular, posterior auricular or occipital adenopathy.   Skin:     General: Skin is warm and dry.   Neurological:      General: No focal deficit present.      Mental Status: She is alert and oriented to person, place, and time.   Psychiatric:         Attention and Perception: Attention and perception normal.         Mood and Affect: Mood and affect normal.         Speech: Speech normal.         Behavior: Behavior normal. Behavior is cooperative.         Thought Content: Thought content normal.         Cognition and Memory: Cognition and memory normal.         Judgment: Judgment normal.         Assessment & Plan:       Type 2 diabetes, uncontrolled, with neuropathy  -     insulin NPH isoph U-100 human (HUMULIN N NPH INSULIN KWIKPEN) 100 unit/mL (3 mL) InPn; Inject 10 Units into the skin 2 (two) times daily before meals. Please replace Lantus patient states cannot afford.  Dispense: 6 mL; Refill: 11  -     pen needle, diabetic 32 gauge x 5/16" Ndle; 1 each by Misc.(Non-Drug; Combo Route) route 2 (two) times a day.  Dispense: 200 each; Refill: 5    Uncontrolled type 2 diabetes mellitus with hyperglycemia  -     glipiZIDE (GLUCOTROL) 10 MG tablet; Take 1 tablet (10 mg total) by mouth 2 (two) times daily before meals.  Dispense: 180 tablet; Refill: 1  -     metFORMIN (GLUCOPHAGE) 1000 MG tablet; TAKE 1 TABLET BY MOUTH TWICE DAILY WITH  MEALS  Dispense: 180 tablet; Refill: 1  -     atorvastatin (LIPITOR) 20 MG tablet; Take 1 tablet (20 mg total) by mouth once daily.  Dispense: 90 tablet; Refill: 1  -     Discontinue: insulin (LANTUS SOLOSTAR U-100 INSULIN) glargine 100 units/mL (3mL) SubQ pen; Inject 25 Units into the skin once daily. Take every evening  Dispense: 15 mL; Refill: 2    Uncontrolled diabetes mellitus with diabetic neuropathy, with long-term current use of insulin  -     Discontinue: insulin (LANTUS SOLOSTAR U-100 INSULIN) glargine 100 units/mL (3mL) SubQ pen; Inject 25 Units into the skin once daily. " "Take every evening  Dispense: 15 mL; Refill: 2    Major depressive disorder, recurrent, mild    Essential hypertension  -     losartan (COZAAR) 50 MG tablet; Take 1 tablet (50 mg total) by mouth once daily.  Dispense: 90 tablet; Refill: 1  -     potassium chloride SA (K-DUR) 20 MEQ tablet; Take 1 tablet (20 mEq total) by mouth once daily.  Dispense: 90 tablet; Refill: 1    Morbid (severe) obesity due to excess calories    Chronic congestive heart failure, unspecified heart failure type    Chronic obstructive pulmonary disease, unspecified COPD type    Essential (hemorrhagic) thrombocythemia    Poor compliance with medication  Comments:  related to financial constraints  Orders:  -     Discontinue: insulin (LANTUS SOLOSTAR U-100 INSULIN) glargine 100 units/mL (3mL) SubQ pen; Inject 25 Units into the skin once daily. Take every evening  Dispense: 15 mL; Refill: 2    I have discussed with patient in great detail the need to please try to take her insulin every day as ordered. She state just cannot afford it and I have expressed that I am willing to change her to a different insulin like NPH to see if it is less cost prohibitive. Discussed in detail increased risk of damage to heart, kidney, vessels, with this uncontrolled DM.      Medication List with Changes/Refills   New Medications    INSULIN NPH ISOPH U-100 HUMAN (HUMULIN N NPH INSULIN KWIKPEN) 100 UNIT/ML (3 ML) INPN    Inject 10 Units into the skin 2 (two) times daily before meals. Please replace Lantus patient states cannot afford.    PEN NEEDLE, DIABETIC 32 GAUGE X 5/16" NDLE    1 each by Misc.(Non-Drug; Combo Route) route 2 (two) times a day.   Current Medications    ALBUTEROL (PROVENTIL) 2.5 MG /3 ML (0.083 %) NEBULIZER SOLUTION    Take 3 mLs (2.5 mg total) by nebulization every 4 (four) hours as needed for Wheezing.    ALCOHOL SWABS (BD ALCOHOL SWAB) PADM    Apply 1 each topically once daily.    ASPIRIN (ECOTRIN) 81 MG EC TABLET    Take 1 tablet (81 mg " total) by mouth once daily.    BLOOD SUGAR DIAGNOSTIC (BLOOD GLUCOSE TEST) STRP    Microlet lancets & Contour Test strips. Test BID.    CALCIUM CARBONATE ORAL    Take 1,800 mg by mouth once daily.    DOCOSAHEXANOIC ACID/EPA (FISH OIL ORAL)    Take 1 capsule by mouth once daily.    ERGOCALCIFEROL, VITAMIN D2, (VITAMIN D ORAL)    Take 1,000 mg by mouth once daily.    FLUTICASONE-SALMETEROL DISKUS INHALER 250-50 MCG    Inhale 1 puff into the lungs 2 (two) times daily.    FUROSEMIDE (LASIX) 40 MG TABLET    Take 40 mg by mouth 2 (two) times daily.    IRON-VITAMIN C 100-250 MG, ICAR-C, 100-250 MG TAB    Take one tablet in the morning with an acidic drink before breakfast.    LANCETS (TRUEPLUS LANCETS) MISC    Use daily    MULTIVITAMIN-MINERALS-LUTEIN (CENTRUM SILVER) TAB    Take 1 tablet by mouth once daily.    NORTRIPTYLINE (PAMELOR) 25 MG CAPSULE    TAKE 1 CAPSULE BY MOUTH IN THE EVENING    OFLOXACIN (OCUFLOX) 0.3 % OPHTHALMIC SOLUTION    Place 1 drop into the left eye 4 (four) times daily.    OMEPRAZOLE (PRILOSEC) 40 MG CAPSULE    Take 1 capsule (40 mg total) by mouth once daily.    PREDNISOLONE ACETATE (PRED FORTE) 1 % DRPS    Place 1 drop into the left eye 4 (four) times daily.   Changed and/or Refilled Medications    Modified Medication Previous Medication    ATORVASTATIN (LIPITOR) 20 MG TABLET atorvastatin (LIPITOR) 20 MG tablet       Take 1 tablet (20 mg total) by mouth once daily.    Take 1 tablet (20 mg total) by mouth once daily.    CARVEDILOL (COREG) 6.25 MG TABLET carvediloL (COREG) 6.25 MG tablet       Take 1 tablet (6.25 mg total) by mouth 2 (two) times daily.    Take 1 tablet by mouth twice daily    GLIPIZIDE (GLUCOTROL) 10 MG TABLET glipiZIDE (GLUCOTROL) 10 MG tablet       Take 1 tablet (10 mg total) by mouth 2 (two) times daily before meals.    TAKE 1 TABLET BY MOUTH TWICE DAILY BEFORE  MEALS    LOSARTAN (COZAAR) 50 MG TABLET losartan (COZAAR) 50 MG tablet       Take 1 tablet (50 mg total) by mouth once  daily.    Take 1 tablet by mouth once daily    METFORMIN (GLUCOPHAGE) 1000 MG TABLET metFORMIN (GLUCOPHAGE) 1000 MG tablet       TAKE 1 TABLET BY MOUTH TWICE DAILY WITH MEALS    TAKE 1 TABLET BY MOUTH TWICE DAILY WITH  MEALS    METFORMIN (GLUCOPHAGE) 1000 MG TABLET metFORMIN (GLUCOPHAGE) 1000 MG tablet       TAKE 1 TABLET BY MOUTH TWICE DAILY WITH  MEALS    TAKE 1 TABLET BY MOUTH TWICE DAILY WITH  MEALS    POTASSIUM CHLORIDE SA (K-DUR) 20 MEQ TABLET potassium chloride SA (K-DUR) 20 MEQ tablet       Take 1 tablet (20 mEq total) by mouth once daily.    Take 1 tablet (20 mEq total) by mouth once daily.   Discontinued Medications    INSULIN (LANTUS SOLOSTAR U-100 INSULIN) GLARGINE 100 UNITS/ML (3ML) SUBQ PEN    Inject 25 Units into the skin once daily. Take every evening     Follow up in about 4 weeks (around 4/25/2022), or if symptoms worsen or fail to improve.

## 2022-03-28 NOTE — TELEPHONE ENCOUNTER
No new care gaps identified.  Powered by [employer]. Reference number: [id number].   3/28/2022 10:45:29 AM CDT

## 2022-03-30 RX ORDER — INSULIN HUMAN 100 [IU]/ML
10 INJECTION, SUSPENSION SUBCUTANEOUS
Qty: 6 ML | Refills: 11 | Status: SHIPPED | OUTPATIENT
Start: 2022-03-30 | End: 2023-07-10 | Stop reason: SDUPTHER

## 2022-03-30 RX ORDER — PEN NEEDLE, DIABETIC 32GX 5/32"
1 NEEDLE, DISPOSABLE MISCELLANEOUS 2 TIMES DAILY
Qty: 200 EACH | Refills: 5 | Status: SHIPPED | OUTPATIENT
Start: 2022-03-30 | End: 2023-03-30

## 2022-04-14 ENCOUNTER — LAB VISIT (OUTPATIENT)
Dept: LAB | Facility: CLINIC | Age: 82
End: 2022-04-14
Payer: MEDICARE

## 2022-04-14 DIAGNOSIS — R94.6 ABNORMAL THYROID FUNCTION TEST: ICD-10-CM

## 2022-04-14 DIAGNOSIS — E78.49 OTHER HYPERLIPIDEMIA: ICD-10-CM

## 2022-04-14 DIAGNOSIS — D50.8 OTHER IRON DEFICIENCY ANEMIA: ICD-10-CM

## 2022-04-14 LAB
ANION GAP SERPL CALC-SCNC: 12 MMOL/L (ref 8–16)
BASOPHILS # BLD AUTO: 0.07 K/UL (ref 0–0.2)
BASOPHILS NFR BLD: 0.6 % (ref 0–1.9)
BUN SERPL-MCNC: 20 MG/DL (ref 8–23)
CALCIUM SERPL-MCNC: 9.6 MG/DL (ref 8.7–10.5)
CHLORIDE SERPL-SCNC: 99 MMOL/L (ref 95–110)
CHOLEST SERPL-MCNC: 106 MG/DL (ref 120–199)
CHOLEST/HDLC SERPL: 3.1 {RATIO} (ref 2–5)
CO2 SERPL-SCNC: 25 MMOL/L (ref 23–29)
CREAT SERPL-MCNC: 0.8 MG/DL (ref 0.5–1.4)
DIFFERENTIAL METHOD: ABNORMAL
EOSINOPHIL # BLD AUTO: 0.1 K/UL (ref 0–0.5)
EOSINOPHIL NFR BLD: 0.9 % (ref 0–8)
ERYTHROCYTE [DISTWIDTH] IN BLOOD BY AUTOMATED COUNT: 16.9 % (ref 11.5–14.5)
EST. GFR  (AFRICAN AMERICAN): >60 ML/MIN/1.73 M^2
EST. GFR  (NON AFRICAN AMERICAN): >60 ML/MIN/1.73 M^2
ESTIMATED AVG GLUCOSE: 346 MG/DL (ref 68–131)
FERRITIN SERPL-MCNC: 9 NG/ML (ref 20–300)
GLUCOSE SERPL-MCNC: 392 MG/DL (ref 70–110)
HBA1C MFR BLD: 13.7 % (ref 4–5.6)
HCT VFR BLD AUTO: 40.5 % (ref 37–48.5)
HDLC SERPL-MCNC: 34 MG/DL (ref 40–75)
HDLC SERPL: 32.1 % (ref 20–50)
HGB BLD-MCNC: 11.9 G/DL (ref 12–16)
IMM GRANULOCYTES # BLD AUTO: 0.04 K/UL (ref 0–0.04)
IMM GRANULOCYTES NFR BLD AUTO: 0.3 % (ref 0–0.5)
IRON SERPL-MCNC: 57 UG/DL (ref 30–160)
LDLC SERPL CALC-MCNC: 40.6 MG/DL (ref 63–159)
LYMPHOCYTES # BLD AUTO: 2 K/UL (ref 1–4.8)
LYMPHOCYTES NFR BLD: 17.6 % (ref 18–48)
MCH RBC QN AUTO: 23 PG (ref 27–31)
MCHC RBC AUTO-ENTMCNC: 29.4 G/DL (ref 32–36)
MCV RBC AUTO: 78 FL (ref 82–98)
MONOCYTES # BLD AUTO: 0.8 K/UL (ref 0.3–1)
MONOCYTES NFR BLD: 6.7 % (ref 4–15)
NEUTROPHILS # BLD AUTO: 8.6 K/UL (ref 1.8–7.7)
NEUTROPHILS NFR BLD: 73.9 % (ref 38–73)
NONHDLC SERPL-MCNC: 72 MG/DL
NRBC BLD-RTO: 0 /100 WBC
PLATELET # BLD AUTO: 318 K/UL (ref 150–450)
PMV BLD AUTO: 10.3 FL (ref 9.2–12.9)
POTASSIUM SERPL-SCNC: 4 MMOL/L (ref 3.5–5.1)
RBC # BLD AUTO: 5.18 M/UL (ref 4–5.4)
SATURATED IRON: 12 % (ref 20–50)
SODIUM SERPL-SCNC: 136 MMOL/L (ref 136–145)
TOTAL IRON BINDING CAPACITY: 469 UG/DL (ref 250–450)
TRANSFERRIN SERPL-MCNC: 317 MG/DL (ref 200–375)
TRIGL SERPL-MCNC: 157 MG/DL (ref 30–150)
TSH SERPL DL<=0.005 MIU/L-ACNC: 2.77 UIU/ML (ref 0.4–4)
WBC # BLD AUTO: 11.61 K/UL (ref 3.9–12.7)

## 2022-04-14 PROCEDURE — 36415 PR COLLECTION VENOUS BLOOD,VENIPUNCTURE: ICD-10-PCS | Mod: ,,, | Performed by: FAMILY MEDICINE

## 2022-04-14 PROCEDURE — 80061 LIPID PANEL: CPT | Performed by: FAMILY MEDICINE

## 2022-04-14 PROCEDURE — 36415 COLL VENOUS BLD VENIPUNCTURE: CPT | Mod: ,,, | Performed by: FAMILY MEDICINE

## 2022-04-14 PROCEDURE — 82728 ASSAY OF FERRITIN: CPT | Performed by: FAMILY MEDICINE

## 2022-04-14 PROCEDURE — 84443 ASSAY THYROID STIM HORMONE: CPT | Performed by: FAMILY MEDICINE

## 2022-04-14 PROCEDURE — 80048 BASIC METABOLIC PNL TOTAL CA: CPT | Performed by: FAMILY MEDICINE

## 2022-04-14 PROCEDURE — 84466 ASSAY OF TRANSFERRIN: CPT | Performed by: FAMILY MEDICINE

## 2022-04-14 PROCEDURE — 83036 HEMOGLOBIN GLYCOSYLATED A1C: CPT | Performed by: FAMILY MEDICINE

## 2022-04-14 PROCEDURE — 85025 COMPLETE CBC W/AUTO DIFF WBC: CPT | Performed by: FAMILY MEDICINE

## 2022-10-27 DIAGNOSIS — E11.65 UNCONTROLLED TYPE 2 DIABETES MELLITUS WITH HYPERGLYCEMIA: ICD-10-CM

## 2022-10-27 DIAGNOSIS — I50.9 CHRONIC CONGESTIVE HEART FAILURE, UNSPECIFIED HEART FAILURE TYPE: ICD-10-CM

## 2022-10-27 DIAGNOSIS — I10 ESSENTIAL HYPERTENSION: ICD-10-CM

## 2022-10-27 NOTE — TELEPHONE ENCOUNTER
LOV 3/2022  Patient no showed her 8/2022 appointment.   Note on Rx mentions needs appointment for refills.     Pended for 30 day supply/no refills with note sharing last refill until visit.   Requesting supply as we work to schedule patient.     DUE FOR LABS due to uncontrolled diabetes. DO you want to add to these?

## 2022-10-27 NOTE — TELEPHONE ENCOUNTER
----- Message from Diana Norton sent at 10/27/2022 12:54 PM CDT -----  Contact: self  Type: RX Refill Request        Who Called: Patient   Refill or New Rx: refill   RX Name and Strength:   carvediloL (COREG) 6.25 MG tablet  losartan (COZAAR) 50 MG tablet  atorvastatin (LIPITOR) 20 MG tablet  How is the patient currently taking it? (ex. 1XDay): as directed   Is this a 30 day or 90 day RX: 90 tablet and 180 tablet   Preferred Pharmacy with phone number:   [facility] [address] [address]  Phone: [phone] Fax: [phone]  Local or Mail Order: local   Ordering Provider: Dr. Ashlee Porter   Best Call Back Number: [phone]  Additional Information: Patient states she just needs refills on these medications. Patient is out of medication for all 3 of them. Thanks

## 2022-10-27 NOTE — TELEPHONE ENCOUNTER
No new care gaps identified.  [facility] Embedded Care Gaps. Reference number: [id number]. 10/27/2022   2:00:40 PM CDT

## 2022-11-01 RX ORDER — LOSARTAN POTASSIUM 50 MG/1
50 TABLET ORAL DAILY
Qty: 30 TABLET | Refills: 0 | Status: SHIPPED | OUTPATIENT
Start: 2022-11-01 | End: 2023-02-06 | Stop reason: SDUPTHER

## 2022-11-01 RX ORDER — ATORVASTATIN CALCIUM 20 MG/1
20 TABLET, FILM COATED ORAL DAILY
Qty: 30 TABLET | Refills: 0 | Status: SHIPPED | OUTPATIENT
Start: 2022-11-01 | End: 2023-01-26

## 2022-11-01 RX ORDER — CARVEDILOL 6.25 MG/1
6.25 TABLET ORAL 2 TIMES DAILY
Qty: 60 TABLET | Refills: 0 | Status: SHIPPED | OUTPATIENT
Start: 2022-11-01 | End: 2023-02-06 | Stop reason: SDUPTHER

## 2023-02-06 ENCOUNTER — OFFICE VISIT (OUTPATIENT)
Dept: PRIMARY CARE CLINIC | Facility: CLINIC | Age: 83
End: 2023-02-06
Payer: MEDICARE

## 2023-02-06 VITALS
HEART RATE: 68 BPM | BODY MASS INDEX: 29.27 KG/M2 | WEIGHT: 160.06 LBS | TEMPERATURE: 97 F | OXYGEN SATURATION: 96 % | DIASTOLIC BLOOD PRESSURE: 60 MMHG | RESPIRATION RATE: 18 BRPM | SYSTOLIC BLOOD PRESSURE: 130 MMHG

## 2023-02-06 DIAGNOSIS — Z79.899 POLYPHARMACY: ICD-10-CM

## 2023-02-06 DIAGNOSIS — I50.9 CHRONIC CONGESTIVE HEART FAILURE, UNSPECIFIED HEART FAILURE TYPE: ICD-10-CM

## 2023-02-06 DIAGNOSIS — I10 PRIMARY HYPERTENSION: Primary | ICD-10-CM

## 2023-02-06 DIAGNOSIS — E11.65 UNCONTROLLED TYPE 2 DIABETES MELLITUS WITH HYPERGLYCEMIA: ICD-10-CM

## 2023-02-06 DIAGNOSIS — Z12.31 ENCOUNTER FOR SCREENING MAMMOGRAM FOR MALIGNANT NEOPLASM OF BREAST: ICD-10-CM

## 2023-02-06 DIAGNOSIS — E66.01 MORBID (SEVERE) OBESITY DUE TO EXCESS CALORIES: ICD-10-CM

## 2023-02-06 DIAGNOSIS — J44.9 CHRONIC OBSTRUCTIVE PULMONARY DISEASE, UNSPECIFIED COPD TYPE: ICD-10-CM

## 2023-02-06 DIAGNOSIS — F33.0 MAJOR DEPRESSIVE DISORDER, RECURRENT, MILD: ICD-10-CM

## 2023-02-06 PROCEDURE — 1101F PR PT FALLS ASSESS DOC 0-1 FALLS W/OUT INJ PAST YR: ICD-10-PCS | Mod: CPTII,S$GLB,, | Performed by: NURSE PRACTITIONER

## 2023-02-06 PROCEDURE — 1126F PR PAIN SEVERITY QUANTIFIED, NO PAIN PRESENT: ICD-10-PCS | Mod: CPTII,S$GLB,, | Performed by: NURSE PRACTITIONER

## 2023-02-06 PROCEDURE — 1160F RVW MEDS BY RX/DR IN RCRD: CPT | Mod: CPTII,S$GLB,, | Performed by: NURSE PRACTITIONER

## 2023-02-06 PROCEDURE — 3288F FALL RISK ASSESSMENT DOCD: CPT | Mod: CPTII,S$GLB,, | Performed by: NURSE PRACTITIONER

## 2023-02-06 PROCEDURE — 3075F PR MOST RECENT SYSTOLIC BLOOD PRESS GE 130-139MM HG: ICD-10-PCS | Mod: CPTII,S$GLB,, | Performed by: NURSE PRACTITIONER

## 2023-02-06 PROCEDURE — 1126F AMNT PAIN NOTED NONE PRSNT: CPT | Mod: CPTII,S$GLB,, | Performed by: NURSE PRACTITIONER

## 2023-02-06 PROCEDURE — 1160F PR REVIEW ALL MEDS BY PRESCRIBER/CLIN PHARMACIST DOCUMENTED: ICD-10-PCS | Mod: CPTII,S$GLB,, | Performed by: NURSE PRACTITIONER

## 2023-02-06 PROCEDURE — 3078F PR MOST RECENT DIASTOLIC BLOOD PRESSURE < 80 MM HG: ICD-10-PCS | Mod: CPTII,S$GLB,, | Performed by: NURSE PRACTITIONER

## 2023-02-06 PROCEDURE — 99214 OFFICE O/P EST MOD 30 MIN: CPT | Mod: S$GLB,,, | Performed by: NURSE PRACTITIONER

## 2023-02-06 PROCEDURE — 1159F MED LIST DOCD IN RCRD: CPT | Mod: CPTII,S$GLB,, | Performed by: NURSE PRACTITIONER

## 2023-02-06 PROCEDURE — 3288F PR FALLS RISK ASSESSMENT DOCUMENTED: ICD-10-PCS | Mod: CPTII,S$GLB,, | Performed by: NURSE PRACTITIONER

## 2023-02-06 PROCEDURE — 1159F PR MEDICATION LIST DOCUMENTED IN MEDICAL RECORD: ICD-10-PCS | Mod: CPTII,S$GLB,, | Performed by: NURSE PRACTITIONER

## 2023-02-06 PROCEDURE — 99214 PR OFFICE/OUTPT VISIT, EST, LEVL IV, 30-39 MIN: ICD-10-PCS | Mod: S$GLB,,, | Performed by: NURSE PRACTITIONER

## 2023-02-06 PROCEDURE — 1101F PT FALLS ASSESS-DOCD LE1/YR: CPT | Mod: CPTII,S$GLB,, | Performed by: NURSE PRACTITIONER

## 2023-02-06 PROCEDURE — 3075F SYST BP GE 130 - 139MM HG: CPT | Mod: CPTII,S$GLB,, | Performed by: NURSE PRACTITIONER

## 2023-02-06 PROCEDURE — 3078F DIAST BP <80 MM HG: CPT | Mod: CPTII,S$GLB,, | Performed by: NURSE PRACTITIONER

## 2023-02-06 RX ORDER — ATORVASTATIN CALCIUM 20 MG/1
20 TABLET, FILM COATED ORAL DAILY
Qty: 90 TABLET | Refills: 1 | Status: SHIPPED | OUTPATIENT
Start: 2023-02-06 | End: 2023-05-17 | Stop reason: SDUPTHER

## 2023-02-06 RX ORDER — METFORMIN HYDROCHLORIDE 1000 MG/1
1000 TABLET ORAL 2 TIMES DAILY WITH MEALS
Qty: 180 TABLET | Refills: 1 | Status: SHIPPED | OUTPATIENT
Start: 2023-02-06 | End: 2023-05-17 | Stop reason: SDUPTHER

## 2023-02-06 RX ORDER — CARVEDILOL 6.25 MG/1
6.25 TABLET ORAL 2 TIMES DAILY
Qty: 180 TABLET | Refills: 1 | Status: SHIPPED | OUTPATIENT
Start: 2023-02-06 | End: 2023-05-17 | Stop reason: SDUPTHER

## 2023-02-06 RX ORDER — GLIPIZIDE 10 MG/1
10 TABLET ORAL
Qty: 180 TABLET | Refills: 1 | Status: SHIPPED | OUTPATIENT
Start: 2023-02-06 | End: 2023-05-17 | Stop reason: SDUPTHER

## 2023-02-06 RX ORDER — LOSARTAN POTASSIUM 50 MG/1
50 TABLET ORAL DAILY
Qty: 90 TABLET | Refills: 1 | Status: SHIPPED | OUTPATIENT
Start: 2023-02-06 | End: 2023-05-17 | Stop reason: SDUPTHER

## 2023-02-06 NOTE — PROGRESS NOTES
Subjective:       Patient ID: Latoya Helton is a 82 y.o. female.    Chief Complaint: Follow-up chronic medical diagnosis    HPI  She is here to follow up with DM and   Vitals:    02/06/23 1452   BP: 130/60   Pulse: 68   Resp: 18   Temp: 97.4 °F (36.3 °C)     Review of Systems   Respiratory:  Negative for shortness of breath.    Neurological:  Negative for dizziness.     Lab Results   Component Value Date    HGBA1C 13.7 (H) 04/14/2022    Home -110  States she has stopped smoking since the death of her sister last year.    Objective:      Physical Exam  Vitals and nursing note reviewed.   Constitutional:       General: She is awake.      Appearance: Normal appearance. She is well-developed, well-groomed and overweight.   HENT:      Head: Normocephalic and atraumatic.      Right Ear: Hearing and external ear normal.      Left Ear: Hearing and external ear normal.   Eyes:      General: Lids are normal.         Right eye: No foreign body or discharge.         Left eye: No foreign body or discharge.   Cardiovascular:      Rate and Rhythm: Normal rate and regular rhythm.   Pulmonary:      Effort: Pulmonary effort is normal.      Breath sounds: Normal breath sounds.   Musculoskeletal:      Cervical back: Normal range of motion and neck supple.      Right lower leg: No edema.      Left lower leg: No edema.   Skin:     General: Skin is warm and dry.      Findings: No rash.   Neurological:      General: No focal deficit present.      Mental Status: She is alert and oriented to person, place, and time.   Psychiatric:         Attention and Perception: Attention and perception normal.         Mood and Affect: Mood and affect normal.         Speech: Speech normal.         Behavior: Behavior is cooperative.         Thought Content: Thought content normal. Thought content does not include homicidal or suicidal ideation. Thought content does not include homicidal or suicidal plan.         Cognition and Memory: Cognition and  memory normal.       Assessment & Plan:       Primary hypertension- Controlled  -     Hemoglobin A1C; Future; Expected date: 02/06/2023  -     Comprehensive Metabolic Panel; Future; Expected date: 02/06/2023  -     losartan (COZAAR) 50 MG tablet; Take 1 tablet (50 mg total) by mouth once daily. Last refill until appointment  Dispense: 90 tablet; Refill: 1  BP Readings from Last 3 Encounters:   02/06/23 130/60   03/28/22 (!) 160/78   06/14/21 138/64      Uncontrolled type 2 diabetes mellitus with hyperglycemia- questionable compliance per review of her medication reconciliation  -     Hemoglobin A1C; Future; Expected date: 02/06/2023  -     glipiZIDE (GLUCOTROL) 10 MG tablet; Take 1 tablet (10 mg total) by mouth 2 (two) times daily before meals.  Dispense: 180 tablet; Refill: 1  -     metFORMIN (GLUCOPHAGE) 1000 MG tablet; Take 1 tablet (1,000 mg total) by mouth 2 (two) times daily with meals.  Dispense: 180 tablet; Refill: 1  -     atorvastatin (LIPITOR) 20 MG tablet; Take 1 tablet (20 mg total) by mouth once daily. Appointment required for future refills  Dispense: 90 tablet; Refill: 1  Lab Results   Component Value Date    HGBA1C 13.7 (H) 04/14/2022    I have discussed with her to get her labs done this Thursday    Major depressive disorder, recurrent, mild- Stable       Chronic congestive heart failure, unspecified heart failure type  -     carvediloL (COREG) 6.25 MG tablet; Take 1 tablet (6.25 mg total) by mouth 2 (two) times daily. LAST refill until appointment  Dispense: 180 tablet; Refill: 1    Chronic obstructive pulmonary disease, unspecified COPD type    Morbid (severe) obesity due to excess calories    Polypharmacy    Encounter for screening mammogram for malignant neoplasm of breast  -     Mammo Digital Screening Bilat; Future; Expected date: 02/06/2023    I do have some concerns about her compliance with medications but she does insist that she is taking them as ordered      Medication List with  "Changes/Refills   Current Medications    ALBUTEROL (PROVENTIL) 2.5 MG /3 ML (0.083 %) NEBULIZER SOLUTION    Take 3 mLs (2.5 mg total) by nebulization every 4 (four) hours as needed for Wheezing.    ALCOHOL SWABS (BD ALCOHOL SWAB) PADM    Apply 1 each topically once daily.    ASPIRIN (ECOTRIN) 81 MG EC TABLET    Take 1 tablet (81 mg total) by mouth once daily.    BLOOD SUGAR DIAGNOSTIC (BLOOD GLUCOSE TEST) STRP    Microlet lancets & Contour Test strips. Test BID.    CALCIUM CARBONATE ORAL    Take 1,800 mg by mouth once daily.    DOCOSAHEXANOIC ACID/EPA (FISH OIL ORAL)    Take 1 capsule by mouth once daily.    ERGOCALCIFEROL, VITAMIN D2, (VITAMIN D ORAL)    Take 1,000 mg by mouth once daily.    FLUTICASONE-SALMETEROL DISKUS INHALER 250-50 MCG    Inhale 1 puff into the lungs 2 (two) times daily.    FUROSEMIDE (LASIX) 40 MG TABLET    Take 40 mg by mouth 2 (two) times daily.    INSULIN NPH ISOPH U-100 HUMAN (HUMULIN N NPH INSULIN KWIKPEN) 100 UNIT/ML (3 ML) INPN    Inject 10 Units into the skin 2 (two) times daily before meals. Please replace Lantus patient states cannot afford.    IRON-VITAMIN C 100-250 MG, ICAR-C, 100-250 MG TAB    Take one tablet in the morning with an acidic drink before breakfast.    LANCETS (TRUEPLUS LANCETS) MISC    Use daily    MULTIVITAMIN-MINERALS-LUTEIN (CENTRUM SILVER) TAB    Take 1 tablet by mouth once daily.    NORTRIPTYLINE (PAMELOR) 25 MG CAPSULE    TAKE 1 CAPSULE BY MOUTH IN THE EVENING    OFLOXACIN (OCUFLOX) 0.3 % OPHTHALMIC SOLUTION    Place 1 drop into the left eye 4 (four) times daily.    OMEPRAZOLE (PRILOSEC) 40 MG CAPSULE    Take 1 capsule (40 mg total) by mouth once daily.    PEN NEEDLE, DIABETIC 32 GAUGE X 5/16" NDLE    1 each by Misc.(Non-Drug; Combo Route) route 2 (two) times a day.    POTASSIUM CHLORIDE SA (K-DUR) 20 MEQ TABLET    Take 1 tablet (20 mEq total) by mouth once daily.    PREDNISOLONE ACETATE (PRED FORTE) 1 % DRPS    Place 1 drop into the left eye 4 (four) times " daily.   Changed and/or Refilled Medications    Modified Medication Previous Medication    ATORVASTATIN (LIPITOR) 20 MG TABLET atorvastatin (LIPITOR) 20 MG tablet       Take 1 tablet (20 mg total) by mouth once daily. Appointment required for future refills    TAKE 1 TABLET BY MOUTH ONCE DAILY . APPOINTMENT REQUIRED FOR FUTURE REFILLS    CARVEDILOL (COREG) 6.25 MG TABLET carvediloL (COREG) 6.25 MG tablet       Take 1 tablet (6.25 mg total) by mouth 2 (two) times daily. LAST refill until appointment    Take 1 tablet (6.25 mg total) by mouth 2 (two) times daily. LAST refill until appointment    GLIPIZIDE (GLUCOTROL) 10 MG TABLET glipiZIDE (GLUCOTROL) 10 MG tablet       Take 1 tablet (10 mg total) by mouth 2 (two) times daily before meals.    Take 1 tablet (10 mg total) by mouth 2 (two) times daily before meals.    LOSARTAN (COZAAR) 50 MG TABLET losartan (COZAAR) 50 MG tablet       Take 1 tablet (50 mg total) by mouth once daily. Last refill until appointment    Take 1 tablet (50 mg total) by mouth once daily. Last refill until appointment    METFORMIN (GLUCOPHAGE) 1000 MG TABLET metFORMIN (GLUCOPHAGE) 1000 MG tablet       Take 1 tablet (1,000 mg total) by mouth 2 (two) times daily with meals.    TAKE 1 TABLET BY MOUTH TWICE DAILY WITH MEALS          Follow up in about 2 months (around 4/6/2023), or if symptoms worsen or fail to improve.

## 2023-02-07 ENCOUNTER — PATIENT OUTREACH (OUTPATIENT)
Dept: ADMINISTRATIVE | Facility: HOSPITAL | Age: 83
End: 2023-02-07
Payer: MEDICARE

## 2023-03-22 ENCOUNTER — PES CALL (OUTPATIENT)
Dept: ADMINISTRATIVE | Facility: CLINIC | Age: 83
End: 2023-03-22
Payer: MEDICARE

## 2023-04-06 ENCOUNTER — LAB VISIT (OUTPATIENT)
Dept: PRIMARY CARE CLINIC | Facility: CLINIC | Age: 83
End: 2023-04-06
Payer: MEDICARE

## 2023-04-06 ENCOUNTER — TELEPHONE (OUTPATIENT)
Dept: FAMILY MEDICINE | Facility: CLINIC | Age: 83
End: 2023-04-06
Payer: MEDICARE

## 2023-04-06 DIAGNOSIS — I10 PRIMARY HYPERTENSION: ICD-10-CM

## 2023-04-06 DIAGNOSIS — E11.65 UNCONTROLLED TYPE 2 DIABETES MELLITUS WITH HYPERGLYCEMIA: ICD-10-CM

## 2023-04-06 LAB
ALBUMIN SERPL BCP-MCNC: 3.6 G/DL (ref 3.5–5.2)
ALP SERPL-CCNC: 112 U/L (ref 55–135)
ALT SERPL W/O P-5'-P-CCNC: 16 U/L (ref 10–44)
ANION GAP SERPL CALC-SCNC: 13 MMOL/L (ref 8–16)
AST SERPL-CCNC: 10 U/L (ref 10–40)
BILIRUB SERPL-MCNC: 0.3 MG/DL (ref 0.1–1)
BUN SERPL-MCNC: 16 MG/DL (ref 8–23)
CALCIUM SERPL-MCNC: 9.4 MG/DL (ref 8.7–10.5)
CHLORIDE SERPL-SCNC: 96 MMOL/L (ref 95–110)
CO2 SERPL-SCNC: 23 MMOL/L (ref 23–29)
CREAT SERPL-MCNC: 1.3 MG/DL (ref 0.5–1.4)
EST. GFR  (NO RACE VARIABLE): 41.6 ML/MIN/1.73 M^2
ESTIMATED AVG GLUCOSE: ABNORMAL MG/DL (ref 68–131)
GLUCOSE SERPL-MCNC: 709 MG/DL (ref 70–110)
HBA1C MFR BLD: >14 % (ref 4–5.6)
POTASSIUM SERPL-SCNC: 4.5 MMOL/L (ref 3.5–5.1)
PROT SERPL-MCNC: 6.4 G/DL (ref 6–8.4)
SODIUM SERPL-SCNC: 132 MMOL/L (ref 136–145)

## 2023-04-06 PROCEDURE — 83036 HEMOGLOBIN GLYCOSYLATED A1C: CPT | Performed by: NURSE PRACTITIONER

## 2023-04-06 PROCEDURE — 80053 COMPREHEN METABOLIC PANEL: CPT | Performed by: NURSE PRACTITIONER

## 2023-04-07 NOTE — TELEPHONE ENCOUNTER
"Received critical value and spoke with patient via telephone and she states she did speak with physician a moment ago. She did state that she is taking the metformin and glucotrol but not taking her insulin because was not able to afford to get it. Has not taken it in a few months since her sister . States was taking insulin from her sister. She states "feel fine". I have also encouraged to go to the ED for evaluation but she states "will go if feels bad". Instructed that this is a critical level and that she should be very concerned but she states will only go if feels bad. I have encouraged her to follow up in clinic on Monday as scheduled and we can see if she can possibly find some type of assistance for her to get insulin. Also discussed no concentrated sugars (soda, tea, juice, cookies, desserts) and to cut her portions of starches by half. Again stressed that she should go to ED before closing call.   "

## 2023-04-07 NOTE — PROGRESS NOTES
On call note  Received phone call at approximately 9:15 p.m. tonight about critical lab value, glucose greater than 700    Called patient to check on her. Says she's not having any symptoms, feels her normal self. Reports compliance with her diabetes medication. Currently just taking metformin and glipizide.    Did instruct patient to consider emergency room evaluation if she started feeling bad, but in the meantime maintain compliance with medication and instructed patient to keep diet very low carb. Will send message to her primary care provider for treatment.

## 2023-04-10 ENCOUNTER — TELEPHONE (OUTPATIENT)
Dept: FAMILY MEDICINE | Facility: CLINIC | Age: 83
End: 2023-04-10
Payer: MEDICARE

## 2023-04-10 DIAGNOSIS — E11.65 UNCONTROLLED TYPE 2 DIABETES MELLITUS WITH HYPERGLYCEMIA: Primary | ICD-10-CM

## 2023-04-10 NOTE — PROGRESS NOTES
Patient scheduled on 4/14/23 at 2:40 pm. Patient states she is not able to come in sooner than that date.

## 2023-04-10 NOTE — TELEPHONE ENCOUNTER
Spoke with patient via telephone an she states home CBS all 350's.States she has really been watching everything she eats and only drinking water.I explained that I would reach out to see if the low cost insulin plan is in effect and how much she can get insulin for at her local [facility] and will reach back out to her.

## 2023-04-10 NOTE — TELEPHONE ENCOUNTER
Spoke with patient via telephone. Explained that I had spoken with [facility] pharmacy and can get Humulin N (Reli-On brand) 10 ml vial for $25. I have again explained that this it is the best price that I have found and that it is important that she gets it and begin taking. She will also keep her appt on the 14 th.

## 2023-04-14 ENCOUNTER — OFFICE VISIT (OUTPATIENT)
Dept: PRIMARY CARE CLINIC | Facility: CLINIC | Age: 83
End: 2023-04-14
Payer: MEDICARE

## 2023-04-14 VITALS
HEART RATE: 82 BPM | WEIGHT: 161.94 LBS | SYSTOLIC BLOOD PRESSURE: 139 MMHG | DIASTOLIC BLOOD PRESSURE: 66 MMHG | TEMPERATURE: 98 F | BODY MASS INDEX: 29.62 KG/M2 | OXYGEN SATURATION: 96 % | RESPIRATION RATE: 18 BRPM

## 2023-04-14 DIAGNOSIS — Z91.148 POOR COMPLIANCE WITH MEDICATION: ICD-10-CM

## 2023-04-14 DIAGNOSIS — R73.09 HEMOGLOBIN A1C GREATER THAN 9%, INDICATING POOR DIABETIC CONTROL: ICD-10-CM

## 2023-04-14 DIAGNOSIS — Z59.9 FINANCIAL DIFFICULTY: ICD-10-CM

## 2023-04-14 DIAGNOSIS — I10 PRIMARY HYPERTENSION: ICD-10-CM

## 2023-04-14 DIAGNOSIS — E11.65 UNCONTROLLED TYPE 2 DIABETES MELLITUS WITH HYPERGLYCEMIA: Primary | ICD-10-CM

## 2023-04-14 PROCEDURE — 1126F AMNT PAIN NOTED NONE PRSNT: CPT | Mod: CPTII,S$GLB,, | Performed by: NURSE PRACTITIONER

## 2023-04-14 PROCEDURE — 3078F PR MOST RECENT DIASTOLIC BLOOD PRESSURE < 80 MM HG: ICD-10-PCS | Mod: CPTII,S$GLB,, | Performed by: NURSE PRACTITIONER

## 2023-04-14 PROCEDURE — 99215 OFFICE O/P EST HI 40 MIN: CPT | Mod: S$GLB,,, | Performed by: NURSE PRACTITIONER

## 2023-04-14 PROCEDURE — 1101F PR PT FALLS ASSESS DOC 0-1 FALLS W/OUT INJ PAST YR: ICD-10-PCS | Mod: CPTII,S$GLB,, | Performed by: NURSE PRACTITIONER

## 2023-04-14 PROCEDURE — 3078F DIAST BP <80 MM HG: CPT | Mod: CPTII,S$GLB,, | Performed by: NURSE PRACTITIONER

## 2023-04-14 PROCEDURE — 3075F PR MOST RECENT SYSTOLIC BLOOD PRESS GE 130-139MM HG: ICD-10-PCS | Mod: CPTII,S$GLB,, | Performed by: NURSE PRACTITIONER

## 2023-04-14 PROCEDURE — 3288F PR FALLS RISK ASSESSMENT DOCUMENTED: ICD-10-PCS | Mod: CPTII,S$GLB,, | Performed by: NURSE PRACTITIONER

## 2023-04-14 PROCEDURE — 1126F PR PAIN SEVERITY QUANTIFIED, NO PAIN PRESENT: ICD-10-PCS | Mod: CPTII,S$GLB,, | Performed by: NURSE PRACTITIONER

## 2023-04-14 PROCEDURE — 1101F PT FALLS ASSESS-DOCD LE1/YR: CPT | Mod: CPTII,S$GLB,, | Performed by: NURSE PRACTITIONER

## 2023-04-14 PROCEDURE — 3288F FALL RISK ASSESSMENT DOCD: CPT | Mod: CPTII,S$GLB,, | Performed by: NURSE PRACTITIONER

## 2023-04-14 PROCEDURE — 99215 PR OFFICE/OUTPT VISIT, EST, LEVL V, 40-54 MIN: ICD-10-PCS | Mod: S$GLB,,, | Performed by: NURSE PRACTITIONER

## 2023-04-14 PROCEDURE — 3075F SYST BP GE 130 - 139MM HG: CPT | Mod: CPTII,S$GLB,, | Performed by: NURSE PRACTITIONER

## 2023-04-14 RX ORDER — NAPROXEN SODIUM 220 MG
1 TABLET ORAL 2 TIMES DAILY
Qty: 90 EACH | Refills: 5 | Status: SHIPPED | OUTPATIENT
Start: 2023-04-14

## 2023-04-14 SDOH — SOCIAL DETERMINANTS OF HEALTH (SDOH): PROBLEM RELATED TO HOUSING AND ECONOMIC CIRCUMSTANCES, UNSPECIFIED: Z59.9

## 2023-04-14 NOTE — PROGRESS NOTES
"Subjective:       Patient ID: Latoya Helton is a 82 y.o. female.    Chief Complaint: Follow-up  Last seen in primary care by me on 02/06/2023.  HPI  She is here to follow up with DM.   Vitals:    04/14/23 1424   BP: 139/66   Pulse: 82   Resp: 18   Temp: 97.8 °F (36.6 °C)     Review of Systems   Eyes:  Negative for visual disturbance.   Respiratory:  Negative for shortness of breath.    Cardiovascular:  Negative for chest pain.   Endocrine: Negative for polyphagia.     She did  insulin but has not taken any because states could not get her syringes  Objective:      Physical Exam  Vitals and nursing note reviewed.   Constitutional:       General: She is awake.      Appearance: Normal appearance. She is well-developed.   HENT:      Head: Normocephalic and atraumatic.   Cardiovascular:      Rate and Rhythm: Normal rate and regular rhythm.      Heart sounds: Normal heart sounds.   Pulmonary:      Effort: Pulmonary effort is normal.      Breath sounds: Normal breath sounds and air entry.   Musculoskeletal:      Cervical back: Normal range of motion and neck supple.      Right lower leg: No edema.      Left lower leg: No edema.   Skin:     General: Skin is warm and dry.   Neurological:      General: No focal deficit present.      Mental Status: She is alert and oriented to person, place, and time.   Psychiatric:         Attention and Perception: Attention and perception normal.         Mood and Affect: Mood and affect normal.         Speech: Speech normal.         Behavior: Behavior normal. Behavior is cooperative.         Thought Content: Thought content normal.         Cognition and Memory: Cognition and memory normal.         Judgment: Judgment normal.       Assessment & Plan:       Uncontrolled type 2 diabetes mellitus with hyperglycemia  -     insulin syringe-needle U-100 0.5 mL 31 gauge x 5/16" Syrg; 1 each by Misc.(Non-Drug; Combo Route) route 2 (two) times a day.  Dispense: 90 each; Refill: 5  -     " "Ambulatory referral/consult to Outpatient Case Management    Hemoglobin A1C greater than 9%, indicating poor diabetic control  -     Ambulatory referral/consult to Outpatient Case Management    Poor compliance with medication  Comments:  related to financial difficulty    Primary hypertension    Financial difficulty    She did get insulin from pharmacy but states she does not have needles and could not afford them.  I have called three pharmacy's and was finally able to locate syringes for her and she was sent to pick them up and returned to clinic for full training on drawing up and giving insulin.  I assisted her with drawing up and injecting step by step during visit and she felt very comfortable and actually injected herself and states felt comfortable with doing. She was able to verbally recall the amount to take twice daily  More than 45 minutes spent face to face with this patient including demonstration dn assistance with injection    Medication List with Changes/Refills   New Medications    INSULIN SYRINGE-NEEDLE U-100 0.5 ML 31 GAUGE X 5/16" SYRG    1 each by Misc.(Non-Drug; Combo Route) route 2 (two) times a day.   Current Medications    ALBUTEROL (PROVENTIL) 2.5 MG /3 ML (0.083 %) NEBULIZER SOLUTION    Take 3 mLs (2.5 mg total) by nebulization every 4 (four) hours as needed for Wheezing.    ALCOHOL SWABS (BD ALCOHOL SWAB) PADM    Apply 1 each topically once daily.    ASPIRIN (ECOTRIN) 81 MG EC TABLET    Take 1 tablet (81 mg total) by mouth once daily.    ATORVASTATIN (LIPITOR) 20 MG TABLET    Take 1 tablet (20 mg total) by mouth once daily. Appointment required for future refills    BLOOD SUGAR DIAGNOSTIC (BLOOD GLUCOSE TEST) STRP    Microlet lancets & Contour Test strips. Test BID.    CALCIUM CARBONATE ORAL    Take 1,800 mg by mouth once daily.    CARVEDILOL (COREG) 6.25 MG TABLET    Take 1 tablet (6.25 mg total) by mouth 2 (two) times daily. LAST refill until appointment    DOCOSAHEXANOIC ACID/EPA " (FISH OIL ORAL)    Take 1 capsule by mouth once daily.    ERGOCALCIFEROL, VITAMIN D2, (VITAMIN D ORAL)    Take 1,000 mg by mouth once daily.    FLUTICASONE-SALMETEROL DISKUS INHALER 250-50 MCG    Inhale 1 puff into the lungs 2 (two) times daily.    FUROSEMIDE (LASIX) 40 MG TABLET    Take 40 mg by mouth 2 (two) times daily.    GLIPIZIDE (GLUCOTROL) 10 MG TABLET    Take 1 tablet (10 mg total) by mouth 2 (two) times daily before meals.    INSULIN NPH ISOPH U-100 HUMAN (HUMULIN N NPH INSULIN KWIKPEN) 100 UNIT/ML (3 ML) INPN    Inject 10 Units into the skin 2 (two) times daily before meals. Please replace Lantus patient states cannot afford.    IRON-VITAMIN C 100-250 MG, ICAR-C, 100-250 MG TAB    Take one tablet in the morning with an acidic drink before breakfast.    LANCETS (TRUEPLUS LANCETS) MISC    Use daily    LOSARTAN (COZAAR) 50 MG TABLET    Take 1 tablet (50 mg total) by mouth once daily. Last refill until appointment    METFORMIN (GLUCOPHAGE) 1000 MG TABLET    Take 1 tablet (1,000 mg total) by mouth 2 (two) times daily with meals.    MULTIVITAMIN-MINERALS-LUTEIN (CENTRUM SILVER) TAB    Take 1 tablet by mouth once daily.    NORTRIPTYLINE (PAMELOR) 25 MG CAPSULE    TAKE 1 CAPSULE BY MOUTH IN THE EVENING    OFLOXACIN (OCUFLOX) 0.3 % OPHTHALMIC SOLUTION    Place 1 drop into the left eye 4 (four) times daily.    OMEPRAZOLE (PRILOSEC) 40 MG CAPSULE    Take 1 capsule (40 mg total) by mouth once daily.    POTASSIUM CHLORIDE SA (K-DUR) 20 MEQ TABLET    Take 1 tablet (20 mEq total) by mouth once daily.    PREDNISOLONE ACETATE (PRED FORTE) 1 % DRPS    Place 1 drop into the left eye 4 (four) times daily.          No follow-ups on file.

## 2023-05-17 DIAGNOSIS — E11.65 UNCONTROLLED TYPE 2 DIABETES MELLITUS WITH HYPERGLYCEMIA: ICD-10-CM

## 2023-05-17 DIAGNOSIS — I10 PRIMARY HYPERTENSION: ICD-10-CM

## 2023-05-17 DIAGNOSIS — I50.9 CHRONIC CONGESTIVE HEART FAILURE, UNSPECIFIED HEART FAILURE TYPE: ICD-10-CM

## 2023-05-17 RX ORDER — ATORVASTATIN CALCIUM 20 MG/1
20 TABLET, FILM COATED ORAL DAILY
Qty: 90 TABLET | Refills: 1 | Status: SHIPPED | OUTPATIENT
Start: 2023-05-17

## 2023-05-17 RX ORDER — METFORMIN HYDROCHLORIDE 1000 MG/1
1000 TABLET ORAL 2 TIMES DAILY WITH MEALS
Qty: 180 TABLET | Refills: 1 | Status: SHIPPED | OUTPATIENT
Start: 2023-05-17

## 2023-05-17 RX ORDER — GLIPIZIDE 10 MG/1
10 TABLET ORAL
Qty: 180 TABLET | Refills: 1 | Status: SHIPPED | OUTPATIENT
Start: 2023-05-17

## 2023-05-17 RX ORDER — CARVEDILOL 6.25 MG/1
6.25 TABLET ORAL 2 TIMES DAILY
Qty: 180 TABLET | Refills: 1 | Status: SHIPPED | OUTPATIENT
Start: 2023-05-17

## 2023-05-17 RX ORDER — LOSARTAN POTASSIUM 50 MG/1
50 TABLET ORAL DAILY
Qty: 90 TABLET | Refills: 1 | Status: SHIPPED | OUTPATIENT
Start: 2023-05-17

## 2023-05-17 NOTE — TELEPHONE ENCOUNTER
----- Message from Nelly Chawla sent at 5/17/2023 11:57 AM CDT -----  Regarding: MEDICATION REQUEST  Please refill the medication(s) listed below. Please call the patient when the prescription(s) is ready for  at this phone number     DHARMESH SALGADO [[id number]]  [phone]         Medication #1 metFORMIN (GLUCOPHAGE) 1000 MG tablet     Medication #2 atorvastatin (LIPITOR) 20 MG tablet    Medication #3 carvediloL (COREG) 6.25 MG tablet    Medication #4 losartan (COZAAR) 50 MG tablet    Medication #5 glipiZIDE (GLUCOTROL) 10 MG tablet          Preferred Pharmacy:    [facility]   Telephone: [phone]  Fax: [phone]

## 2023-06-01 ENCOUNTER — PATIENT OUTREACH (OUTPATIENT)
Dept: ADMINISTRATIVE | Facility: OTHER | Age: 83
End: 2023-06-01
Payer: MEDICARE

## 2023-06-06 ENCOUNTER — OUTPATIENT CASE MANAGEMENT (OUTPATIENT)
Dept: ADMINISTRATIVE | Facility: OTHER | Age: 83
End: 2023-06-06
Payer: MEDICARE

## 2023-06-06 NOTE — PROGRESS NOTES
KEISHA received a referral on the above patient. Per chart review . Patient insurance carrier is [facility]. Patient doesn't meet criteria for OPCM. SW will close case as ineligible. Patient needs will be assessed  by a CHW.

## 2023-06-07 NOTE — PROGRESS NOTES
CHW - Outreach Attempt    Eliane Osullivan Community Health Worker left a voicemail message for 2nd attempt to contact patient regarding: [facility]   Community Health Worker to attempt to contact patient on:  June 9.

## 2023-06-09 ENCOUNTER — PATIENT OUTREACH (OUTPATIENT)
Dept: ADMINISTRATIVE | Facility: OTHER | Age: 83
End: 2023-06-09
Payer: MEDICARE

## 2023-06-09 NOTE — PROGRESS NOTES
CHW - Outreach Attempt    Eliane Osullivan Community Health Worker was unable to leave a voicemail message for 3rd attempt to contact patient regarding: [facility].     CHW - Unable to Contact    Community Health Worker to close episode at this time due to three missed attempts for patient contact.

## 2023-06-23 DIAGNOSIS — I50.9 CHRONIC CONGESTIVE HEART FAILURE, UNSPECIFIED HEART FAILURE TYPE: ICD-10-CM

## 2023-06-23 DIAGNOSIS — E11.65 UNCONTROLLED TYPE 2 DIABETES MELLITUS WITH HYPERGLYCEMIA: ICD-10-CM

## 2023-06-23 RX ORDER — CARVEDILOL 6.25 MG/1
6.25 TABLET ORAL 2 TIMES DAILY
Qty: 180 TABLET | Refills: 1 | OUTPATIENT
Start: 2023-06-23

## 2023-06-23 RX ORDER — ATORVASTATIN CALCIUM 20 MG/1
20 TABLET, FILM COATED ORAL DAILY
Qty: 90 TABLET | Refills: 1 | OUTPATIENT
Start: 2023-06-23

## 2023-06-23 RX ORDER — METFORMIN HYDROCHLORIDE 1000 MG/1
1000 TABLET ORAL 2 TIMES DAILY WITH MEALS
Qty: 180 TABLET | Refills: 1 | OUTPATIENT
Start: 2023-06-23

## 2023-06-23 NOTE — TELEPHONE ENCOUNTER
No care due was identified.  [facility] Embedded Care Due Messages. Reference number: [id number].   6/23/2023 3:24:32 PM CDT

## 2023-06-23 NOTE — TELEPHONE ENCOUNTER
----- Message from [place] sent at 2023 12:17 PM CDT -----  Contact: Self  Type:  RX Refill Request    Who Called:  Patient  Refill or New Rx:  New  RX Name and Strength:  insulin NPH isoph U-100 human (HUMULIN N NPH INSULIN KWIKPEN) 100 unit/mL (3 mL) InPn ()    metFORMIN (GLUCOPHAGE) 1000 MG tablet    carvediloL (COREG) 6.25 MG tabl    atorvastatin (LIPITOR) 20 MG tablet      losartan (COZAAR) 50 MG       glipiZIDE (GLUCOTROL) 10 MG tablet  How is the patient currently taking it? (ex. 1XDay):  as directed  Is this a 30 day or 90 day RX:  90  Preferred Pharmacy with phone number:        [facility] - [place], [place] - [address]  [address] [address]  Phone: [phone] Fax: [phone]        Local or Mail Order:  Mail  Ordering Provider:  Abhi Ramos Call Back Number:  [phone]   Additional Information:  Switched to [employer], would like Rx sent to [employer]

## 2023-07-10 ENCOUNTER — TELEPHONE (OUTPATIENT)
Dept: PRIMARY CARE CLINIC | Facility: CLINIC | Age: 83
End: 2023-07-10
Payer: MEDICARE

## 2023-07-10 DIAGNOSIS — E11.65 UNCONTROLLED TYPE 2 DIABETES MELLITUS WITH HYPERGLYCEMIA: ICD-10-CM

## 2023-07-10 RX ORDER — INSULIN HUMAN 100 [IU]/ML
10 INJECTION, SUSPENSION SUBCUTANEOUS
Qty: 6 ML | Refills: 5 | Status: SHIPPED | OUTPATIENT
Start: 2023-07-10 | End: 2024-07-09

## 2023-07-10 NOTE — TELEPHONE ENCOUNTER
----- Message from Mariah Bernard sent at 7/10/2023  3:27 PM CDT -----  Contact: self  Type:  RX Refill Request    Who Called: Pt  Refill or New Rx: Refill  RX Name and Strength: insulin NPH isoph U-100 human (HUMULIN N NPH INSULIN KWIKPEN) 100 unit/mL (3 mL) InPn  How is the patient currently taking it? (ex. 1XDay):as directed   Is this a 30 day or 90 day RX:90   Preferred Pharmacy with phone number:  [place] [id number] [address]  Phone: [phone] Fax: [phone]    Local or Mail Order:local  Ordering Provider: Ashlee Porter NP  Would the patient rather a call back or a response via [facility]?  call  Best Call Back Number: [phone]  Thank you...

## 2023-07-18 ENCOUNTER — PATIENT OUTREACH (OUTPATIENT)
Dept: ADMINISTRATIVE | Facility: OTHER | Age: 83
End: 2023-07-18
Payer: MEDICARE

## 2023-08-07 RX ORDER — HUMAN INSULIN 100 [IU]/ML
INJECTION, SUSPENSION SUBCUTANEOUS
Qty: 10 ML | Refills: 0 | Status: SHIPPED | OUTPATIENT
Start: 2023-08-07

## 2023-11-09 ENCOUNTER — TELEPHONE (OUTPATIENT)
Dept: FAMILY MEDICINE | Facility: CLINIC | Age: 83
End: 2023-11-09
Payer: MEDICARE

## 2023-11-09 DIAGNOSIS — E11.65 UNCONTROLLED TYPE 2 DIABETES MELLITUS WITH HYPERGLYCEMIA: Primary | ICD-10-CM

## 2023-11-09 RX ORDER — SEMAGLUTIDE 0.68 MG/ML
0.25 INJECTION, SOLUTION SUBCUTANEOUS
Qty: 3 ML | Refills: 3 | Status: SHIPPED | OUTPATIENT
Start: 2023-11-09

## 2023-11-09 NOTE — PROGRESS NOTES
Please call this patient and get her an appt for labs and visit [facility] in [place]. She has not been seen since April and HGBA1c > 14. She can have all done if she comes fasting one morning if she cannot come on a day before to have the labs. I am ok with opening a slot at 8:30 any Monday in [place] to accommodate her if there are no appts available.

## 2023-11-09 NOTE — TELEPHONE ENCOUNTER
Spoke with pharmacist at [facility]. Patient is having significant problems with managing DM and insulin as well as cost. I have sent script for ozempic to see if we can help with once weekly injection to hopefully help get in better control and decrease risks as well if this option is affordable through her insurance.    This visit was performed via live interactive two-way video with patient's verbal consent.   Clinician Location: Clinic.  Patient Location: Work.  Rosie is in [place] and identity has been established.   She was informed that consent to treat includes permission to submit charges to the applicable insurance on file. Rosie was advised regarding the potential risk inherent in video visits, as the assessment may be limited due to what can be seen on the screen which potentially results in an incomplete assessment; as well as either of us may discontinue the video visit if it is.        Psychiatric Follow Up Progress Note    Patient: Rosie Saldana Date: 2023   : 1988    34 year old female        Chief complaint: \"Its going okay.\"    INTERVAL HISTORY: Rosie Saldana is a 34 year old White female with diagnoses of generalized anxiety disorder who presented today for medication management. She is currently prescribed zoloft 100 mg tab daily, trazodone 50 mg QHS which she is not taking, and hydroxyzine 25 mg QID as needed 1 tab daily on average. She reports her PCP started her on phentermine to help with energy and weight loss. She reports she took for around a month and then was off for around a week and restarted. She reports improvement in energy since starting the phentermine.     Current mood: \"good.\"    Appetite: \"Normal.\"  Ms. Saldana reports intentional weight loss of 7lbs since last visit. She reports current weight is 193lbs.  Energy: \"a little better.\"  Concentration: \"Good.\"   Motivation is \"better.\"   Denies anhedonia.   Obtains 8 hours per night without problems falling or staying asleep.   Denies suicidal ideation, plan, or intent.    Denies homicidal ideation, plan, or intent.    On a scale 0-10, 10 being the worst of symptoms and 0 being minimal, Ms. Saldana rates situational depression as 4/10 and rates situational anxiety as 4/10.    Last four PHQ 2/9 Test Results  0: Not at all  1: Several  days  2: More than half the days  3: Nearly every day          9/20/2023     8:11 AM 4/12/2023    10:08 AM 1/25/2023     7:52 AM 11/18/2022     9:33 AM   PHQ 2 Score   Adult PHQ 2 Score 2 3 2 2   Adult PHQ 2 Interpretation No further screening needed Further screening needed No further screening needed No further screening needed   Little interest or pleasure in activity? 1 3 1 1         9/20/2023     8:11 AM 4/12/2023    10:08 AM 1/25/2023     7:52 AM 11/18/2022     9:33 AM   PHQ 9 Score   Adult PHQ 9 Score 6 12 6 6   Adult PHQ 9 Interpretation Mild Depression Moderate Depression Mild Depression Mild Depression          Last four GAD7 Assessments           9/20/2023     9:00 AM 1/25/2023     8:00 AM 11/18/2022    10:00 AM 6/7/2022     9:00 AM   GAD7 Screening   GAD7 Score 5 3 8 3   Feeling nervous, anxious or on edge Several days Several days More than half the days Several days   Not being able to stop or control worrying Several days Several days More than half the days Several days   Worrying too much about different things Several days Several days More than half the days Several days   Trouble relaxing Several days Not at all Several days Not at all   Being so restless that it's hard to sit still Not at all Not at all Not at all Not at all   Becoming easily annoyed or irritable Not at all Not at all Not at all Not at all   Feeling afraid as if something awful might happen Several days Not at all Several days Not at all   Ability to handle work, home and other people Somewhat difficult Somewhat difficult Somewhat difficult Somewhat difficult   Date/time completed by patient via [employer] 9/20/2023  8:10 AM    9/20/2023  8:11 AM 1/25/2023  7:51 AM    1/25/2023  7:52 AM 11/18/2022  9:32 AM    11/18/2022  9:33 AM 6/1/2022  6:20 AM    6/1/2022  6:21 AM         ROS:She denies chest pain or SOB.  Constitutional:  Denies fever. Integumentary:  Denies rash or pruritus. Ears, Nose, Throat:  Denies rhinorrhea, ear pain,  hearing loss or sore throat. Respiratory: Denies wheezing or cough. Gastrointestinal: Denies nausea, diarrhea or constipation. She reports improvement of her GI symptoms. Urological:  Denies dysuria, frequency or incontinence. Cardiovascular:  Denies chest pain, palpitations or shortness of breath. Musculoskeletal:  Denies back pain, joint swelling or muscle spasms. Neurological:  Denies headache, weakness or imbalance. Hematological:  Denies gum bleeding or easy bruising.    Current Outpatient Medications   Medication Sig Dispense Refill   • phentermine 37.5 MG capsule Take 1 capsule by mouth every morning. 30 capsule 0   • norethindrone-ethinyl estradiol and ferrous fumarate (Aurovela 24 FE) 1-20 MG-MCG(24) tablet Take 1 tablet by mouth daily. 84 tablet 0   • traZODone (DESYREL) 50 MG tablet Take 1 tablet by mouth nightly. 90 tablet 0   • sertraline (ZOLOFT) 100 MG tablet Take 1 tablet by mouth daily. 90 tablet 1   • hydrOXYzine (ATARAX) 25 MG tablet Take 1 tablet by mouth every 6 hours as needed for Anxiety. (Patient taking differently: Take 25 mg by mouth daily.) 90 tablet 1     No current facility-administered medications for this visit.     Past Medical History:   Diagnosis Date   • Adult attention deficit disorder    • Asthma    • Chlamydia 12/01/2011   • IBS (irritable bowel syndrome)    • Mild dysplasia of cervix (MILLER I) 03/01/2012   • Negative History of CA, HTN, DM, CAD, CVA, DVT, liver disease, migraine    • Pap smear abnormality of cervix with ASCUS favoring dysplasia 01/16/2012    ASC-US, + HR HPV   • Pap smear abnormality of cervix with ASCUS favoring dysplasia 11/03/2014    ASC-US, +HRHPV     Past Surgical History:   Procedure Laterality Date   • COLPOSCOPY BX CERVIX ENDOCERV CURR  3/30/2012    Colposcopy, mild dysplasia   • COLPOSCOPY CERVIX & UPPER / ADJACENT VAGINA  12/17/14    Colposcopy     SUICIDE RISK ASSESSMENT  YES NO If yes, describe    X Suicide attempt in last 24 hours?    X Suicidal  thoughts?    X Plan or considering various methods? Describe:     X Access to means?  No Specify weapon location     X Indication of substance abuse/dependence?    X Attempts in past?  How many?  Date of most recent:   Method used?     X Any family members, loved ones, friends who committed suicide?    X Recent deaths, losses, anniversary dates?    X Has made preparations for death?    X Lack of support system?    [x]   N/A Verbal contract for safety?   X  Patient has no current intent or plan, but agrees to contact provider if Suicidal Ideation arises.   X  Patient given emergency 24 hour access information.      EXAM:   Vitals:   There were no vitals taken for this visit.  Mental Status:  Patient appears her chronological age. Her dressing and appearance are appropriate for today's weather. Eye contact is good. Alert and oriented to person, place, and time. Mood is \"fine\". Affect is stable and congruent with mood. Her speech is fluent and spontaneous. Unable to assess gait and posture appears normal. Her thought processes are logical and clear. Thought content and perceptions are devoid of delusions or hallucinations. Insight and judgment are good. Memory and cognition are intact. She denies suicidal or homicidal ideations.    Diagnosis:   Major depressive disorder, single episode, mild (CMD)  (primary encounter diagnosis)  Generalized anxiety disorder    Medical Decision Making and Plan of Treatment:   1. Therapeutic support provided. She is happy with current medication regimen and does not want to make any changes at this time. AMB BH NEW MEDICATION PRESCRIPTION: There have not been any new medication(s) prescribed today.  2. Patient provided consent to send documents via patient portal. Consent confirmation received: VC:9/18/23 VMC:12/22/22 VTX:12/22/22. VMC and VTX sent.   3. Recommended adequate sleep, fluids, activity and balanced diet.  4. Medications:  · Continue zoloft 100 mg tab daily. Order  sent.  · Continue hydroxyzine 25 mg tab every 8 hours PRN-taking 1 tab nightly. Order sent.   · Continue trazodone 50mg at bedtime. Patient does not need refills at this time.   5. Patient educated on the side effects, risks, benefits, and alternatives of all psychotropic medications.   6. Return in about 4 months (around 1/20/2024). Encouraged to call the office with any questions, comments, concerns or to reschedule an earlier appointment with writer. In the event of any safety concerns and/or life-threatening emergency the patient can call 911 or go to an emergency room or psychiatric hospital.      Orders Placed This Encounter   • sertraline (ZOLOFT) 100 MG tablet     Sig: Take 1 tablet by mouth daily.     Dispense:  90 tablet     Refill:  1   • hydrOXYzine (ATARAX) 25 MG tablet     Sig: Take 1 tablet by mouth every 8 hours as needed for Anxiety.     Dispense:  90 tablet     Refill:  1       This information is confidential and disclosure without patient consent or statutory authorization is prohibited by law.      OBDULIA Neves

## 2023-11-10 ENCOUNTER — DOCUMENTATION ONLY (OUTPATIENT)
Dept: FAMILY MEDICINE | Facility: CLINIC | Age: 83
End: 2023-11-10
Payer: MEDICARE

## 2023-11-13 ENCOUNTER — DOCUMENTATION ONLY (OUTPATIENT)
Dept: FAMILY MEDICINE | Facility: CLINIC | Age: 83
End: 2023-11-13
Payer: MEDICARE

## 2023-12-20 ENCOUNTER — TELEPHONE (OUTPATIENT)
Dept: RESEARCH | Facility: OTHER | Age: 83
End: 2023-12-20
Payer: MEDICARE

## 2023-12-20 NOTE — TELEPHONE ENCOUNTER
Study Title: Transcending COVID-19 barriers to pain care in [place]: Pragmatic comparative effectiveness trial of evidence-based, on-demand, digital behavioral treatments for chronic pain.  Sponsor:  [facility]   Study: [facility] Digital Pain Treatment Study - Transcending COVID-19 barriers to pain care in [place]: Pragmatic comparative effectiveness trial of evidence-based, on-demand, digital behavioral treatments for chronic pain.   IRB/Protocol #: 2021.177 - Phase 2?  Study#([facility]):  [id number]  Principle Investigator - Case Ware   Sub-Investigator - Ryan Araujo   Sponsor:  [facility] Screening Interest in Study Call    Attempt #: 1, 2, 3+: 1      1. Contact Made: [x]Yes []No   1A. If yes, contact date: 12-  1B. If no, date of final contact attempt: n/a  1C. If no, reason(s) contact not made:  []Wrong number []No response []Other   1D. If other, please specify: n/a    2. Verbal commitment: []Yes  [x]No  2A. If yes, verbal commitment date: n/a  2B. If no, reason: [x]Exclusion Criteria Met  []Desire not to participate []No reason provided []Other Inclusion #5 no computer access does not know how to use smart phone or computer  2B1. If Type of Exclusion Criteria Met or other reason, specify: n/a    If patient expressed interest, patient's information will be forwarded to Marisela Douglas, Mark Osullivan, or Stephani Lizarraga.

## 2024-04-12 DIAGNOSIS — E11.65 UNCONTROLLED TYPE 2 DIABETES MELLITUS WITH HYPERGLYCEMIA: ICD-10-CM

## 2024-04-12 DIAGNOSIS — I50.9 CHRONIC CONGESTIVE HEART FAILURE, UNSPECIFIED HEART FAILURE TYPE: ICD-10-CM

## 2024-04-12 DIAGNOSIS — I10 PRIMARY HYPERTENSION: ICD-10-CM

## 2024-04-12 RX ORDER — CARVEDILOL 6.25 MG/1
6.25 TABLET ORAL 2 TIMES DAILY
Qty: 180 TABLET | Refills: 1 | Status: CANCELLED | OUTPATIENT
Start: 2024-04-12 | End: 2024-10-09

## 2024-04-12 RX ORDER — LOSARTAN POTASSIUM 50 MG/1
50 TABLET ORAL DAILY
Qty: 90 TABLET | Refills: 1 | Status: CANCELLED | OUTPATIENT
Start: 2024-04-12 | End: 2024-10-09

## 2024-04-12 RX ORDER — GLIPIZIDE 10 MG/1
10 TABLET ORAL
Qty: 180 TABLET | Refills: 1 | Status: CANCELLED | OUTPATIENT
Start: 2024-04-12 | End: 2024-10-09

## 2024-04-12 NOTE — TELEPHONE ENCOUNTER
----- Message from Lydia Becerra sent at 4/12/2024  3:33 PM CDT -----  Regarding: Refill  Type:  RX Refill Request    Who Called: pt    Refill or New Rx:Refill    RX Name and Strength:carvediloL (COREG) 6.25 MG tablet, losartan (COZAAR) 50 MG tablet  And glipiZIDE (GLUCOTROL) 10 MG tablet       Preferred Pharmacy with phone number:  [facility] [address] [address]  Phone: [phone] Fax: [phone]      Local or Mail Order:Local    Would the patient rather a call back or a response via [facility]? Call back    Best Call Back Number:[phone]    Additional Information: Refill

## 2024-04-12 NOTE — TELEPHONE ENCOUNTER
No care due was identified.  [facility] Embedded Care Due Messages. Reference number: [id number].   4/12/2024 4:16:12 PM CDT

## 2024-04-15 ENCOUNTER — OFFICE VISIT (OUTPATIENT)
Dept: PRIMARY CARE CLINIC | Facility: CLINIC | Age: 84
End: 2024-04-15
Payer: MEDICARE

## 2024-04-15 VITALS
SYSTOLIC BLOOD PRESSURE: 120 MMHG | DIASTOLIC BLOOD PRESSURE: 74 MMHG | HEART RATE: 96 BPM | BODY MASS INDEX: 25.97 KG/M2 | RESPIRATION RATE: 18 BRPM | OXYGEN SATURATION: 97 % | WEIGHT: 142 LBS

## 2024-04-15 DIAGNOSIS — Z79.4 TYPE 2 DIABETES MELLITUS WITH DIABETIC NEUROPATHY, WITH LONG-TERM CURRENT USE OF INSULIN: ICD-10-CM

## 2024-04-15 DIAGNOSIS — I50.9 CHRONIC CONGESTIVE HEART FAILURE, UNSPECIFIED HEART FAILURE TYPE: ICD-10-CM

## 2024-04-15 DIAGNOSIS — I10 PRIMARY HYPERTENSION: ICD-10-CM

## 2024-04-15 DIAGNOSIS — D47.3 ESSENTIAL (HEMORRHAGIC) THROMBOCYTHEMIA: ICD-10-CM

## 2024-04-15 DIAGNOSIS — Z91.148 NON COMPLIANCE W MEDICATION REGIMEN: ICD-10-CM

## 2024-04-15 DIAGNOSIS — F33.0 MAJOR DEPRESSIVE DISORDER, RECURRENT, MILD: ICD-10-CM

## 2024-04-15 DIAGNOSIS — E11.65 UNCONTROLLED TYPE 2 DIABETES MELLITUS WITH HYPERGLYCEMIA: Primary | ICD-10-CM

## 2024-04-15 DIAGNOSIS — J44.9 CHRONIC OBSTRUCTIVE PULMONARY DISEASE, UNSPECIFIED COPD TYPE: ICD-10-CM

## 2024-04-15 DIAGNOSIS — E11.40 TYPE 2 DIABETES MELLITUS WITH DIABETIC NEUROPATHY, WITH LONG-TERM CURRENT USE OF INSULIN: ICD-10-CM

## 2024-04-15 PROBLEM — E66.01 MORBID (SEVERE) OBESITY DUE TO EXCESS CALORIES: Status: RESOLVED | Noted: 2020-04-27 | Resolved: 2024-04-15

## 2024-04-15 LAB
ALBUMIN SERPL BCP-MCNC: 3.9 G/DL (ref 3.5–5.2)
ALBUMIN/CREAT UR: 61.5 UG/MG (ref 0–30)
ALP SERPL-CCNC: 112 U/L (ref 55–135)
ALT SERPL W/O P-5'-P-CCNC: 18 U/L (ref 10–44)
ANION GAP SERPL CALC-SCNC: 20 MMOL/L (ref 8–16)
AST SERPL-CCNC: 25 U/L (ref 10–40)
BASOPHILS # BLD AUTO: 0.07 K/UL (ref 0–0.2)
BASOPHILS NFR BLD: 0.7 % (ref 0–1.9)
BILIRUB SERPL-MCNC: 0.5 MG/DL (ref 0.1–1)
BUN SERPL-MCNC: 15 MG/DL (ref 8–23)
CALCIUM SERPL-MCNC: 10.2 MG/DL (ref 8.7–10.5)
CHLORIDE SERPL-SCNC: 92 MMOL/L (ref 95–110)
CO2 SERPL-SCNC: 20 MMOL/L (ref 23–29)
CREAT SERPL-MCNC: 0.9 MG/DL (ref 0.5–1.4)
CREAT UR-MCNC: 26 MG/DL (ref 15–325)
DIFFERENTIAL METHOD BLD: ABNORMAL
EOSINOPHIL # BLD AUTO: 0 K/UL (ref 0–0.5)
EOSINOPHIL NFR BLD: 0.1 % (ref 0–8)
ERYTHROCYTE [DISTWIDTH] IN BLOOD BY AUTOMATED COUNT: 13.5 % (ref 11.5–14.5)
EST. GFR  (NO RACE VARIABLE): >60 ML/MIN/1.73 M^2
GLUCOSE SERPL-MCNC: 399 MG/DL (ref 70–110)
HCT VFR BLD AUTO: 49.1 % (ref 37–48.5)
HGB BLD-MCNC: 16.4 G/DL (ref 12–16)
IMM GRANULOCYTES # BLD AUTO: 0.07 K/UL (ref 0–0.04)
IMM GRANULOCYTES NFR BLD AUTO: 0.7 % (ref 0–0.5)
LYMPHOCYTES # BLD AUTO: 2.2 K/UL (ref 1–4.8)
LYMPHOCYTES NFR BLD: 22 % (ref 18–48)
MCH RBC QN AUTO: 28.7 PG (ref 27–31)
MCHC RBC AUTO-ENTMCNC: 33.4 G/DL (ref 32–36)
MCV RBC AUTO: 86 FL (ref 82–98)
MICROALBUMIN UR DL<=1MG/L-MCNC: 16 UG/ML
MONOCYTES # BLD AUTO: 0.7 K/UL (ref 0.3–1)
MONOCYTES NFR BLD: 6.7 % (ref 4–15)
NEUTROPHILS # BLD AUTO: 7 K/UL (ref 1.8–7.7)
NEUTROPHILS NFR BLD: 69.8 % (ref 38–73)
NRBC BLD-RTO: 0 /100 WBC
PLATELET # BLD AUTO: 360 K/UL (ref 150–450)
PMV BLD AUTO: 10.5 FL (ref 9.2–12.9)
POTASSIUM SERPL-SCNC: 5 MMOL/L (ref 3.5–5.1)
PROT SERPL-MCNC: 7.6 G/DL (ref 6–8.4)
RBC # BLD AUTO: 5.71 M/UL (ref 4–5.4)
SODIUM SERPL-SCNC: 132 MMOL/L (ref 136–145)
WBC # BLD AUTO: 10 K/UL (ref 3.9–12.7)

## 2024-04-15 PROCEDURE — 3078F DIAST BP <80 MM HG: CPT | Mod: CPTII,S$GLB,, | Performed by: NURSE PRACTITIONER

## 2024-04-15 PROCEDURE — 1159F MED LIST DOCD IN RCRD: CPT | Mod: CPTII,S$GLB,, | Performed by: NURSE PRACTITIONER

## 2024-04-15 PROCEDURE — 1160F RVW MEDS BY RX/DR IN RCRD: CPT | Mod: CPTII,S$GLB,, | Performed by: NURSE PRACTITIONER

## 2024-04-15 PROCEDURE — 82043 UR ALBUMIN QUANTITATIVE: CPT | Performed by: NURSE PRACTITIONER

## 2024-04-15 PROCEDURE — 99214 OFFICE O/P EST MOD 30 MIN: CPT | Mod: S$GLB,,, | Performed by: NURSE PRACTITIONER

## 2024-04-15 PROCEDURE — 80053 COMPREHEN METABOLIC PANEL: CPT | Performed by: NURSE PRACTITIONER

## 2024-04-15 PROCEDURE — 85025 COMPLETE CBC W/AUTO DIFF WBC: CPT | Performed by: NURSE PRACTITIONER

## 2024-04-15 PROCEDURE — 3288F FALL RISK ASSESSMENT DOCD: CPT | Mod: CPTII,S$GLB,, | Performed by: NURSE PRACTITIONER

## 2024-04-15 PROCEDURE — 1126F AMNT PAIN NOTED NONE PRSNT: CPT | Mod: CPTII,S$GLB,, | Performed by: NURSE PRACTITIONER

## 2024-04-15 PROCEDURE — 1101F PT FALLS ASSESS-DOCD LE1/YR: CPT | Mod: CPTII,S$GLB,, | Performed by: NURSE PRACTITIONER

## 2024-04-15 PROCEDURE — 36415 COLL VENOUS BLD VENIPUNCTURE: CPT | Mod: S$GLB,,, | Performed by: FAMILY MEDICINE

## 2024-04-15 PROCEDURE — 3074F SYST BP LT 130 MM HG: CPT | Mod: CPTII,S$GLB,, | Performed by: NURSE PRACTITIONER

## 2024-04-15 RX ORDER — HUMAN INSULIN 100 [IU]/ML
10 INJECTION, SUSPENSION SUBCUTANEOUS
Qty: 10 ML | Refills: 1 | Status: SHIPPED | OUTPATIENT
Start: 2024-04-15 | End: 2024-06-19 | Stop reason: SDUPTHER

## 2024-04-15 RX ORDER — CARVEDILOL 6.25 MG/1
6.25 TABLET ORAL 2 TIMES DAILY
Qty: 180 TABLET | Refills: 1 | Status: SHIPPED | OUTPATIENT
Start: 2024-04-15

## 2024-04-15 RX ORDER — LOSARTAN POTASSIUM 50 MG/1
50 TABLET ORAL DAILY
Qty: 90 TABLET | Refills: 1 | Status: SHIPPED | OUTPATIENT
Start: 2024-04-15

## 2024-04-15 RX ORDER — METFORMIN HYDROCHLORIDE 1000 MG/1
500 TABLET ORAL 2 TIMES DAILY WITH MEALS
Start: 2024-04-15

## 2024-04-15 RX ORDER — ATORVASTATIN CALCIUM 20 MG/1
20 TABLET, FILM COATED ORAL DAILY
Qty: 90 TABLET | Refills: 1 | Status: SHIPPED | OUTPATIENT
Start: 2024-04-15

## 2024-04-15 RX ORDER — GLIPIZIDE 10 MG/1
10 TABLET ORAL
Qty: 180 TABLET | Refills: 1 | Status: SHIPPED | OUTPATIENT
Start: 2024-04-15

## 2024-04-15 NOTE — PROGRESS NOTES
"Subjective:       Patient ID: Latoya Helton is a 83 y.o. female.    Chief Complaint: Follow-up  Last seen in primary care by me on 04/14/2023  She is accompanied today by her nephew.  HPI  She is here today to follow up with chronic medical diagnosis  States she had a lot of family death since last visit and that is why she has missed numerous visits  Vitals:    04/15/24 1418   BP: 120/74   Pulse: 96   Resp: 18     Review of Systems    She states "can't take metformin because it gives me diarrhea".  States took a half this morning after being off for about a month,  She is currently taking Jardiance 10 mg from her sister and she states has been taking it since November.  I have explained that I would want her to take it with her own name on it  She states she stopped smoking last year in August or September    Lab Results   Component Value Date    HGBA1C >14.0 (H) 04/06/2023      Wt Readings from Last 3 Encounters:   04/15/24 1418 64.4 kg (141 lb 15.6 oz)   04/14/23 1424 73.5 kg (161 lb 14.9 oz)   02/06/23 1452 72.6 kg (160 lb 0.9 oz)    She states her blood sugars have been in 300's past few months.  This morning it was 400    Objective:      Physical Exam  Vitals and nursing note reviewed.   Constitutional:       General: She is awake.      Appearance: Normal appearance. She is well-developed and overweight.   HENT:      Head: Normocephalic and atraumatic.      Right Ear: Tympanic membrane, ear canal and external ear normal.      Left Ear: Tympanic membrane, ear canal and external ear normal.   Eyes:      General: Lids are normal.   Cardiovascular:      Rate and Rhythm: Normal rate and regular rhythm.      Heart sounds: Normal heart sounds.   Pulmonary:      Effort: Pulmonary effort is normal.      Breath sounds: Normal breath sounds and air entry.   Musculoskeletal:      Cervical back: Full passive range of motion without pain, normal range of motion and neck supple.      Right lower leg: No edema.      Left " lower leg: No edema.   Lymphadenopathy:      Head:      Right side of head: No submental, submandibular, tonsillar, preauricular, posterior auricular or occipital adenopathy.      Left side of head: No submental, submandibular, tonsillar, preauricular, posterior auricular or occipital adenopathy.   Skin:     General: Skin is warm and dry.      Findings: No rash.   Neurological:      General: No focal deficit present.      Mental Status: She is alert and oriented to person, place, and time.   Psychiatric:         Mood and Affect: Mood normal.         Behavior: Behavior normal. Behavior is cooperative.         Thought Content: Thought content normal.         Judgment: Judgment normal.         Assessment & Plan:       Uncontrolled type 2 diabetes mellitus with hyperglycemia  -     glipiZIDE (GLUCOTROL) 10 MG tablet; Take 1 tablet (10 mg total) by mouth 2 (two) times daily before meals.  Dispense: 180 tablet; Refill: 1  -     insulin NPH (NOVOLIN N NPH U-100 INSULIN) 100 unit/mL injection; Inject 10 Units into the skin 2 (two) times daily before meals.  Dispense: 10 mL; Refill: 1  -     atorvastatin (LIPITOR) 20 MG tablet; Take 1 tablet (20 mg total) by mouth once daily. Appointment required for future refills  Dispense: 90 tablet; Refill: 1  -     metFORMIN (GLUCOPHAGE) 1000 MG tablet; Take 0.5 tablets (500 mg total) by mouth 2 (two) times daily with meals.  -     empagliflozin (JARDIANCE) 10 mg tablet; Take 1 tablet (10 mg total) by mouth once daily.  Dispense: 90 tablet; Refill: 1  -     Microalbumin/Creatinine Ratio, Urine    Type 2 diabetes mellitus with diabetic neuropathy, with long-term current use of insulin  -     glipiZIDE (GLUCOTROL) 10 MG tablet; Take 1 tablet (10 mg total) by mouth 2 (two) times daily before meals.  Dispense: 180 tablet; Refill: 1  -     insulin NPH (NOVOLIN N NPH U-100 INSULIN) 100 unit/mL injection; Inject 10 Units into the skin 2 (two) times daily before meals.  Dispense: 10 mL; Refill:  1  -     metFORMIN (GLUCOPHAGE) 1000 MG tablet; Take 0.5 tablets (500 mg total) by mouth 2 (two) times daily with meals.  -     empagliflozin (JARDIANCE) 10 mg tablet; Take 1 tablet (10 mg total) by mouth once daily.  Dispense: 90 tablet; Refill: 1  -     Microalbumin/Creatinine Ratio, Urine    Primary hypertension  -     Comprehensive Metabolic Panel; Future; Expected date: 04/15/2024  -     losartan (COZAAR) 50 MG tablet; Take 1 tablet (50 mg total) by mouth once daily. Last refill until appointment  Dispense: 90 tablet; Refill: 1  -     CBC W/ AUTO DIFFERENTIAL; Future; Expected date: 04/15/2024    Non compliance w medication regimen    Chronic congestive heart failure, unspecified heart failure type  -     carvediloL (COREG) 6.25 MG tablet; Take 1 tablet (6.25 mg total) by mouth 2 (two) times daily. LAST refill until appointment  Dispense: 180 tablet; Refill: 1    Chronic obstructive pulmonary disease, unspecified COPD type    Essential (hemorrhagic) thrombocythemia    Major depressive disorder, recurrent, mild      Patient has been unable to afford many of her medications sporadically prior.    I have advised to take half tablet twice daily of her metformin 1000 mg  I have sent script for Jardiance and she will  and start ASAP  I have discussed in great detail her increased cardiac risk with such a high HGBA1c.      Medication List with Changes/Refills   New Medications    EMPAGLIFLOZIN (JARDIANCE) 10 MG TABLET    Take 1 tablet (10 mg total) by mouth once daily.   Current Medications    ALBUTEROL (PROVENTIL) 2.5 MG /3 ML (0.083 %) NEBULIZER SOLUTION    Take 3 mLs (2.5 mg total) by nebulization every 4 (four) hours as needed for Wheezing.    ALCOHOL SWABS (BD ALCOHOL SWAB) PADM    Apply 1 each topically once daily.    ASPIRIN (ECOTRIN) 81 MG EC TABLET    Take 1 tablet (81 mg total) by mouth once daily.    BLOOD SUGAR DIAGNOSTIC (BLOOD GLUCOSE TEST) STRP    Microlet lancets & Contour Test strips. Test BID.  "   CALCIUM CARBONATE ORAL    Take 1,800 mg by mouth once daily.    DOCOSAHEXANOIC ACID/EPA (FISH OIL ORAL)    Take 1 capsule by mouth once daily.    ERGOCALCIFEROL, VITAMIN D2, (VITAMIN D ORAL)    Take 1,000 mg by mouth once daily.    FLUTICASONE-SALMETEROL DISKUS INHALER 250-50 MCG    Inhale 1 puff into the lungs 2 (two) times daily.    FUROSEMIDE (LASIX) 40 MG TABLET    Take 40 mg by mouth 2 (two) times daily.    INSULIN SYRINGE-NEEDLE U-100 0.5 ML 31 GAUGE X 5/16" SYRG    1 each by Misc.(Non-Drug; Combo Route) route 2 (two) times a day.    IRON-VITAMIN C 100-250 MG, ICAR-C, 100-250 MG TAB    Take one tablet in the morning with an acidic drink before breakfast.    LANCETS (TRUEPLUS LANCETS) MISC    Use daily    MULTIVITAMIN-MINERALS-LUTEIN (CENTRUM SILVER) TAB    Take 1 tablet by mouth once daily.    POTASSIUM CHLORIDE SA (K-DUR) 20 MEQ TABLET    Take 1 tablet (20 mEq total) by mouth once daily.   Changed and/or Refilled Medications    Modified Medication Previous Medication    ATORVASTATIN (LIPITOR) 20 MG TABLET atorvastatin (LIPITOR) 20 MG tablet       Take 1 tablet (20 mg total) by mouth once daily. Appointment required for future refills    Take 1 tablet (20 mg total) by mouth once daily. Appointment required for future refills    CARVEDILOL (COREG) 6.25 MG TABLET carvediloL (COREG) 6.25 MG tablet       Take 1 tablet (6.25 mg total) by mouth 2 (two) times daily. LAST refill until appointment    Take 1 tablet (6.25 mg total) by mouth 2 (two) times daily. LAST refill until appointment    GLIPIZIDE (GLUCOTROL) 10 MG TABLET glipiZIDE (GLUCOTROL) 10 MG tablet       Take 1 tablet (10 mg total) by mouth 2 (two) times daily before meals.    Take 1 tablet (10 mg total) by mouth 2 (two) times daily before meals.    INSULIN NPH (NOVOLIN N NPH U-100 INSULIN) 100 UNIT/ML INJECTION NOVOLIN N NPH U-100 INSULIN 100 unit/mL injection       Inject 10 Units into the skin 2 (two) times daily before meals.    INJECT 10 UNITS INTO " THE SKIN TWICE DAILY BEFORE MEALS    LOSARTAN (COZAAR) 50 MG TABLET losartan (COZAAR) 50 MG tablet       Take 1 tablet (50 mg total) by mouth once daily. Last refill until appointment    Take 1 tablet (50 mg total) by mouth once daily. Last refill until appointment    METFORMIN (GLUCOPHAGE) 1000 MG TABLET metFORMIN (GLUCOPHAGE) 1000 MG tablet       Take 0.5 tablets (500 mg total) by mouth 2 (two) times daily with meals.    Take 1 tablet (1,000 mg total) by mouth 2 (two) times daily with meals.   Discontinued Medications    INSULIN NPH ISOPH U-100 HUMAN (HUMULIN N NPH INSULIN KWIKPEN) 100 UNIT/ML (3 ML) INPN    Inject 10 Units into the skin 2 (two) times daily before meals. Please replace Lantus patient states cannot afford.    NORTRIPTYLINE (PAMELOR) 25 MG CAPSULE    TAKE 1 CAPSULE BY MOUTH IN THE EVENING    OFLOXACIN (OCUFLOX) 0.3 % OPHTHALMIC SOLUTION    Place 1 drop into the left eye 4 (four) times daily.    OMEPRAZOLE (PRILOSEC) 40 MG CAPSULE    Take 1 capsule (40 mg total) by mouth once daily.    PREDNISOLONE ACETATE (PRED FORTE) 1 % DRPS    Place 1 drop into the left eye 4 (four) times daily.    SEMAGLUTIDE (OZEMPIC) 0.25 MG OR 0.5 MG (2 MG/3 ML) PEN INJECTOR    Inject 0.25 mg into the skin every 7 days.      Follow up in about 4 weeks (around 5/13/2024).

## 2024-04-15 NOTE — PATIENT INSTRUCTIONS
Davidson Klein,     If you are due for any health screening(s) below please notify me so we can arrange them to be ordered and scheduled. Most healthy patients at your age complete them, but you are free to accept or refuse.     If you can't do it, I'll definitely understand. If you can, I'd certainly appreciate it!    Tests to Keep You Healthy    Eye Exam: DUE  Last Blood Pressure <= 139/89 (4/15/2024): NO      Lets manage your A1c levels     Your last hemoglobin A1c was higher than the goal of less than 8. Hemoglobin A1c or HbA1c is a blood test that measures your average blood sugar levels over the past 3 months. It is the main test to help you and your health care team manage your diabetes.     Higher A1c levels are linked to diabetes complications, such as loss of vision, kidney disease, and nerve damage. Keeping your A1c at least less than 8 is important to stay healthy and we are here to help. Talk with your provider on how you can further improve your A1c.     We recommend that you set up blood work to get a repeat hemoglobin A1c in 3 months to monitor your diabetes. Let your health care team know if you have questions.    Your diabetic retinal eye exam is due     Diabetes is the #1 cause of blindness in the [place] - early detection before signs or symptoms develop can prevent debilitating blindness.     Our records indicate that you may be overdue for your annual diabetic eye exam. Eye screening can help identify patients at risk for developing vision loss which is common in diabetes. This simple screening is an important step to keeping you healthy and preventing complications from diabetes.     This recommended diabetic eye exam should take place once per year and can prevent and treat diabetes complications in the eye before developing symptoms. This can be done with a special camera is used to take photographs of the back of your eye without having to dilate them, or you can see an eye doctor for a full dilated  exam.     If you recently had your yearly diabetic eye exam performed outside of [facility], please let your Health care team know so that they can update your health record.

## 2024-04-18 ENCOUNTER — TELEPHONE (OUTPATIENT)
Dept: PRIMARY CARE CLINIC | Facility: CLINIC | Age: 84
End: 2024-04-18
Payer: MEDICARE

## 2024-06-14 ENCOUNTER — TELEPHONE (OUTPATIENT)
Dept: FAMILY MEDICINE | Facility: CLINIC | Age: 84
End: 2024-06-14
Payer: MEDICARE

## 2024-06-14 NOTE — TELEPHONE ENCOUNTER
Tried to call pt. [phone] (number does not work)  [phone] (this is the wrong number)     Pt needs appt with Dr.Darg PEÑA

## 2024-06-19 DIAGNOSIS — Z79.4 TYPE 2 DIABETES MELLITUS WITH DIABETIC NEUROPATHY, WITH LONG-TERM CURRENT USE OF INSULIN: ICD-10-CM

## 2024-06-19 DIAGNOSIS — E11.40 TYPE 2 DIABETES MELLITUS WITH DIABETIC NEUROPATHY, WITH LONG-TERM CURRENT USE OF INSULIN: ICD-10-CM

## 2024-06-19 DIAGNOSIS — E11.65 UNCONTROLLED TYPE 2 DIABETES MELLITUS WITH HYPERGLYCEMIA: ICD-10-CM

## 2024-06-19 RX ORDER — HUMAN INSULIN 100 [IU]/ML
10 INJECTION, SUSPENSION SUBCUTANEOUS
Qty: 10 ML | Refills: 1 | Status: SHIPPED | OUTPATIENT
Start: 2024-06-19 | End: 2024-07-19

## 2024-06-19 NOTE — TELEPHONE ENCOUNTER
----- Message from Alba Meramare Renee sent at 6/19/2024 11:17 AM CDT -----  Type:  Pharmacy Calling to Clarify an RX    Name of Caller:  [facility]   Pharmacy Name:    [facility] [address] [address]  Phone: [phone] Fax: [phone]    Prescription Name:  insulin NPH (NOVOLIN N NPH U-100 INSULIN) 100 unit/mL injection  What do they need to clarify?:  pts ins changed and now DOC is humulin N asking to get rx changed to pts ins preferred rx   Best Call Back Number:  [phone]  Additional Information:  please advise asap thanks!

## 2024-06-19 NOTE — TELEPHONE ENCOUNTER
No care due was identified.  [facility] Embedded Care Due Messages. Reference number: [id number].   6/19/2024 11:26:32 AM CDT

## 2024-06-19 NOTE — TELEPHONE ENCOUNTER
Refill Routing Note   Medication(s) are not appropriate for processing by [facility] for the following reason(s):        Patient not seen by provider within 15 months  Required labs outdated  No active prescription written by provider    [facility] action(s):  Route             Appointments  past 12m or future 3m with PCP    Date Provider   Last Visit   6/14/2021 Cony Trejo MD   Next Visit   Visit date not found Cony Trejo MD   ED visits in past 90 days: 0        Note composed:3:35 PM 06/19/2024

## 2024-10-24 DIAGNOSIS — E11.65 UNCONTROLLED TYPE 2 DIABETES MELLITUS WITH HYPERGLYCEMIA: ICD-10-CM

## 2024-10-24 DIAGNOSIS — Z79.4 TYPE 2 DIABETES MELLITUS WITH DIABETIC NEUROPATHY, WITH LONG-TERM CURRENT USE OF INSULIN: ICD-10-CM

## 2024-10-24 DIAGNOSIS — I50.9 CHRONIC CONGESTIVE HEART FAILURE, UNSPECIFIED HEART FAILURE TYPE: ICD-10-CM

## 2024-10-24 DIAGNOSIS — I10 PRIMARY HYPERTENSION: ICD-10-CM

## 2024-10-24 DIAGNOSIS — E11.40 TYPE 2 DIABETES MELLITUS WITH DIABETIC NEUROPATHY, WITH LONG-TERM CURRENT USE OF INSULIN: ICD-10-CM

## 2024-10-25 RX ORDER — LOSARTAN POTASSIUM 50 MG/1
50 TABLET ORAL DAILY
Qty: 90 TABLET | Refills: 0 | Status: SHIPPED | OUTPATIENT
Start: 2024-10-25

## 2024-10-25 RX ORDER — CARVEDILOL 6.25 MG/1
6.25 TABLET ORAL 2 TIMES DAILY
Qty: 180 TABLET | Refills: 0 | Status: SHIPPED | OUTPATIENT
Start: 2024-10-25

## 2024-10-25 RX ORDER — GLIPIZIDE 10 MG/1
10 TABLET ORAL
Qty: 180 TABLET | Refills: 0 | Status: SHIPPED | OUTPATIENT
Start: 2024-10-25

## 2024-10-25 RX ORDER — ATORVASTATIN CALCIUM 20 MG/1
20 TABLET, FILM COATED ORAL DAILY
Qty: 90 TABLET | Refills: 0 | Status: SHIPPED | OUTPATIENT
Start: 2024-10-25

## 2024-12-21 PROBLEM — N39.0 UTI (URINARY TRACT INFECTION): Status: ACTIVE | Noted: 2024-12-21

## 2024-12-21 PROBLEM — E11.10 DKA (DIABETIC KETOACIDOSIS): Status: ACTIVE | Noted: 2024-12-21

## 2024-12-21 PROBLEM — S22.081A BURST FRACTURE OF T12 VERTEBRA: Status: ACTIVE | Noted: 2024-12-21

## 2024-12-22 PROBLEM — S22.089A CLOSED FRACTURE OF TWELFTH THORACIC VERTEBRA: Status: ACTIVE | Noted: 2024-12-21

## 2024-12-23 PROBLEM — Z73.6 LIMITATION OF ACTIVITY DUE TO DISABILITY: Status: ACTIVE | Noted: 2024-12-23

## 2024-12-23 PROBLEM — Z75.8 DISCHARGE PLANNING ISSUES: Status: ACTIVE | Noted: 2024-12-23

## 2024-12-26 ENCOUNTER — TELEPHONE (OUTPATIENT)
Dept: PAIN MEDICINE | Facility: CLINIC | Age: 84
End: 2024-12-26
Payer: MEDICARE

## 2024-12-26 ENCOUNTER — TELEPHONE (OUTPATIENT)
Dept: FAMILY MEDICINE | Facility: CLINIC | Age: 84
End: 2024-12-26
Payer: MEDICARE

## 2024-12-26 NOTE — TELEPHONE ENCOUNTER
Called to scheduled the patient a hospital follow up     They are not able to get here till after February 3 due to vehicle.     I did schedule the apt per the patient request.     Did advise they should have a follow up 1 to 2 weeks but they state they can not make it here.

## 2024-12-26 NOTE — TELEPHONE ENCOUNTER
----- Message from Aruna sent at 12/26/2024  1:42 PM CST -----  Type:  Sooner Appointment Request    Caller is requesting a sooner appointment.  Caller declined first available appointment listed below.  Caller will not accept being placed on the waitlist and is requesting a message be sent to doctor.  Name of Caller:[facility]       When is the first available appointment?none  Symptoms: hosptail f/u    Would the patient rather a call back or a response via [facility]? call  Best Call Back Number: [phone]      Additional Information:  Please call back to advise. Thank you!

## 2024-12-26 NOTE — TELEPHONE ENCOUNTER
----- Message from Isaias sent at 12/26/2024  2:00 PM CST -----  Contact: Hernan  Type:  Sooner Appointment Request    Caller is requesting a sooner appointment.  Caller declined first available appointment listed below.  Caller will not accept being placed on the waitlist and is requesting a message be sent to doctor.    Name of Caller:  Hernan  When is the first available appointment?  N/a  Symptoms:  hosp f/u  Would the patient rather a call back or a response via [facility]? Call  Best Call Back Number:  [phone]   Additional Information:

## 2024-12-27 ENCOUNTER — TELEPHONE (OUTPATIENT)
Dept: FAMILY MEDICINE | Facility: CLINIC | Age: 84
End: 2024-12-27
Payer: MEDICARE

## 2024-12-27 NOTE — TELEPHONE ENCOUNTER
----- Message from Isha sent at 12/27/2024  2:47 PM CST -----  Contact: home health  Type:  Needs Medical Advice    Who Called: Ana with [facility] home health    Would the patient rather a call back or a response via [facility]?  Call back    Best Call Back Number:     Additional Information:  will not be admitting pt to home health for safety of staff    Nurse went out to admit and witnessed drug activity on property     Please call to advise    Thanks

## 2024-12-30 ENCOUNTER — TELEPHONE (OUTPATIENT)
Dept: FAMILY MEDICINE | Facility: CLINIC | Age: 84
End: 2024-12-30
Payer: MEDICARE

## 2024-12-30 NOTE — TELEPHONE ENCOUNTER
----- Message from Violet sent at 12/30/2024  1:54 PM CST -----  Type: Needs Medical Advice  Who Called:  PT     Best Call Back Number: [phone] (home)     Additional Information: pt requesting call back in regards to her blood test strips please advise

## 2024-12-30 NOTE — TELEPHONE ENCOUNTER
Patient stated that she needs a new prescription for her test strips [facility]

## 2025-01-07 ENCOUNTER — TELEPHONE (OUTPATIENT)
Dept: PRIMARY CARE CLINIC | Facility: CLINIC | Age: 85
End: 2025-01-07
Payer: MEDICARE

## 2025-01-07 NOTE — TELEPHONE ENCOUNTER
Left message on cell # to call clinic to sched hosp follow up . Attempted top reach pt on home # , # disconnected --lp

## 2025-01-07 NOTE — TELEPHONE ENCOUNTER
----- Message from Tata sent at 12/27/2024  2:38 PM CST -----  Contact: Patient  Type:  Appointment Request        Name of Caller:Patient     When is the first available appointment?Feb 7th in the wrong office     Symptoms:Hospital FU/ System is not allowing scheduling in cc office     Best Call Back Number:[phone]    Additional Information: Please call to advise

## (undated) DEVICE — PENCIL ROCKER SWITCH 10FT CORD

## (undated) DEVICE — ELECTRODE REM PLYHSV RETURN 9

## (undated) DEVICE — DURAPREP SURG SCRUB 26ML

## (undated) DEVICE — ADHESIVE MASTISOL VIAL 48/BX

## (undated) DEVICE — REMOVER LOTION

## (undated) DEVICE — SUT 2/0 36IN COATED VICRYL

## (undated) DEVICE — SEE MEDLINE ITEM 153151

## (undated) DEVICE — SUT MONOCYRL 4-0 PS2 UND

## (undated) DEVICE — CLOSURE SKIN STERI STRIP 1/4X3

## (undated) DEVICE — SUT 2/0 36IN ETHIBOND EXCE

## (undated) DEVICE — SEE MEDLINE ITEM 157148

## (undated) DEVICE — SYR 10CC LUER LOCK

## (undated) DEVICE — APPLICATOR CHLORAPREP CLR 10.5

## (undated) DEVICE — SUT 0 VICRYL / CT-1

## (undated) DEVICE — STAPLER SKIN PROXIMATE WIDE

## (undated) DEVICE — PROCLAIM PATIENT CONTROLLER

## (undated) DEVICE — SEE MEDLINE ITEM 157128

## (undated) DEVICE — DRAPE INCISE IOBAN 2 23X17IN

## (undated) DEVICE — GLOVE SURGICAL LATEX SZ 7

## (undated) DEVICE — Device

## (undated) DEVICE — SEE MEDLINE ITEM 146313

## (undated) DEVICE — SEE MEDLINE ITEM 152622

## (undated) DEVICE — SOL STRL WATER INJ 1000ML BG

## (undated) DEVICE — NDL HYPO REG 25G X 1 1/2

## (undated) DEVICE — SEE MEDLINE ITEM 157131